# Patient Record
Sex: FEMALE | Race: WHITE | NOT HISPANIC OR LATINO | URBAN - METROPOLITAN AREA
[De-identification: names, ages, dates, MRNs, and addresses within clinical notes are randomized per-mention and may not be internally consistent; named-entity substitution may affect disease eponyms.]

---

## 2017-06-27 ENCOUNTER — APPOINTMENT (OUTPATIENT)
Dept: URBAN - METROPOLITAN AREA CLINIC 200 | Age: 57
Setting detail: DERMATOLOGY
End: 2017-06-27

## 2017-06-27 DIAGNOSIS — L57.8 OTHER SKIN CHANGES DUE TO CHRONIC EXPOSURE TO NONIONIZING RADIATION: ICD-10-CM

## 2017-06-27 DIAGNOSIS — D22 MELANOCYTIC NEVI: ICD-10-CM

## 2017-06-27 PROBLEM — L55.1 SUNBURN OF SECOND DEGREE: Status: ACTIVE | Noted: 2017-06-27

## 2017-06-27 PROBLEM — D22.5 MELANOCYTIC NEVI OF TRUNK: Status: ACTIVE | Noted: 2017-06-27

## 2017-06-27 PROBLEM — L50.9 URTICARIA, UNSPECIFIED: Status: ACTIVE | Noted: 2017-06-27

## 2017-06-27 PROCEDURE — OTHER COUNSELING: OTHER

## 2017-06-27 PROCEDURE — 99213 OFFICE O/P EST LOW 20 MIN: CPT

## 2017-06-27 ASSESSMENT — LOCATION DETAILED DESCRIPTION DERM: LOCATION DETAILED: UPPER STERNUM

## 2017-06-27 ASSESSMENT — LOCATION SIMPLE DESCRIPTION DERM: LOCATION SIMPLE: CHEST

## 2017-06-27 ASSESSMENT — LOCATION ZONE DERM: LOCATION ZONE: TRUNK

## 2018-02-08 ENCOUNTER — APPOINTMENT (OUTPATIENT)
Dept: URBAN - METROPOLITAN AREA CLINIC 200 | Age: 58
Setting detail: DERMATOLOGY
End: 2018-02-13

## 2018-02-08 DIAGNOSIS — L70.0 ACNE VULGARIS: ICD-10-CM

## 2018-02-08 DIAGNOSIS — D22 MELANOCYTIC NEVI: ICD-10-CM

## 2018-02-08 DIAGNOSIS — L57.8 OTHER SKIN CHANGES DUE TO CHRONIC EXPOSURE TO NONIONIZING RADIATION: ICD-10-CM

## 2018-02-08 PROBLEM — D22.5 MELANOCYTIC NEVI OF TRUNK: Status: ACTIVE | Noted: 2018-02-08

## 2018-02-08 PROCEDURE — OTHER COUNSELING: OTHER

## 2018-02-08 PROCEDURE — OTHER PRESCRIPTION SAMPLES PROVIDED: OTHER

## 2018-02-08 PROCEDURE — 99213 OFFICE O/P EST LOW 20 MIN: CPT

## 2018-02-08 ASSESSMENT — LOCATION ZONE DERM
LOCATION ZONE: FACE
LOCATION ZONE: TRUNK

## 2018-02-08 ASSESSMENT — LOCATION SIMPLE DESCRIPTION DERM
LOCATION SIMPLE: RIGHT CHEEK
LOCATION SIMPLE: CHEST

## 2018-02-08 ASSESSMENT — LOCATION DETAILED DESCRIPTION DERM
LOCATION DETAILED: UPPER STERNUM
LOCATION DETAILED: RIGHT INFERIOR MEDIAL MALAR CHEEK

## 2018-06-14 ENCOUNTER — TRANSCRIBE ORDERS (OUTPATIENT)
Dept: SCHEDULING | Age: 58
End: 2018-06-14

## 2018-06-14 DIAGNOSIS — Z12.39 ENCOUNTER FOR OTHER SCREENING FOR MALIGNANT NEOPLASM OF BREAST: Primary | ICD-10-CM

## 2018-06-26 ENCOUNTER — HOSPITAL ENCOUNTER (OUTPATIENT)
Dept: RADIOLOGY | Facility: HOSPITAL | Age: 58
Discharge: HOME | End: 2018-06-26
Attending: OBSTETRICS & GYNECOLOGY
Payer: COMMERCIAL

## 2018-06-26 DIAGNOSIS — Z12.39 ENCOUNTER FOR OTHER SCREENING FOR MALIGNANT NEOPLASM OF BREAST: ICD-10-CM

## 2018-06-26 PROCEDURE — 77067 SCR MAMMO BI INCL CAD: CPT

## 2018-08-08 ENCOUNTER — RX ONLY (RX ONLY)
Age: 58
End: 2018-08-08

## 2018-08-08 ENCOUNTER — APPOINTMENT (OUTPATIENT)
Dept: URBAN - METROPOLITAN AREA CLINIC 200 | Age: 58
Setting detail: DERMATOLOGY
End: 2018-08-16

## 2018-08-08 DIAGNOSIS — L57.8 OTHER SKIN CHANGES DUE TO CHRONIC EXPOSURE TO NONIONIZING RADIATION: ICD-10-CM

## 2018-08-08 DIAGNOSIS — L0391 CELLULITIS AND ABSCESS OF UNSPECIFIED SITES: ICD-10-CM

## 2018-08-08 DIAGNOSIS — Z85.828 PERSONAL HISTORY OF OTHER MALIGNANT NEOPLASM OF SKIN: ICD-10-CM

## 2018-08-08 DIAGNOSIS — L0390 CELLULITIS AND ABSCESS OF UNSPECIFIED SITES: ICD-10-CM

## 2018-08-08 DIAGNOSIS — D485 NEOPLASM OF UNCERTAIN BEHAVIOR OF SKIN: ICD-10-CM

## 2018-08-08 PROBLEM — L03.211 CELLULITIS OF FACE: Status: ACTIVE | Noted: 2018-08-08

## 2018-08-08 PROBLEM — D48.5 NEOPLASM OF UNCERTAIN BEHAVIOR OF SKIN: Status: ACTIVE | Noted: 2018-08-08

## 2018-08-08 PROBLEM — L55.1 SUNBURN OF SECOND DEGREE: Status: ACTIVE | Noted: 2018-08-08

## 2018-08-08 PROCEDURE — OTHER DEFER: OTHER

## 2018-08-08 PROCEDURE — OTHER COUNSELING: OTHER

## 2018-08-08 PROCEDURE — OTHER PRESCRIPTION: OTHER

## 2018-08-08 PROCEDURE — 99213 OFFICE O/P EST LOW 20 MIN: CPT

## 2018-08-08 RX ORDER — AMOXICILLIN AND CLAVULANATE POTASSIUM 875; 125 MG/1; 1/1
TABLET, FILM COATED ORAL
Qty: 14 | Refills: 0 | Status: ERX

## 2018-08-08 ASSESSMENT — LOCATION DETAILED DESCRIPTION DERM
LOCATION DETAILED: LEFT CHIN
LOCATION DETAILED: RIGHT CHIN
LOCATION DETAILED: LEFT INFERIOR ANTERIOR NECK
LOCATION DETAILED: LEFT MEDIAL SUPERIOR CHEST

## 2018-08-08 ASSESSMENT — LOCATION ZONE DERM
LOCATION ZONE: FACE
LOCATION ZONE: NECK
LOCATION ZONE: TRUNK

## 2018-08-08 ASSESSMENT — LOCATION SIMPLE DESCRIPTION DERM
LOCATION SIMPLE: LEFT ANTERIOR NECK
LOCATION SIMPLE: CHIN
LOCATION SIMPLE: CHEST

## 2018-08-08 ASSESSMENT — SEVERITY ASSESSMENT: SEVERITY: MILD TO MODERATE

## 2018-08-13 ENCOUNTER — HOSPITAL ENCOUNTER (EMERGENCY)
Facility: HOSPITAL | Age: 58
Discharge: HOME | End: 2018-08-13
Attending: EMERGENCY MEDICINE
Payer: COMMERCIAL

## 2018-08-13 VITALS
BODY MASS INDEX: 21.16 KG/M2 | HEART RATE: 82 BPM | SYSTOLIC BLOOD PRESSURE: 138 MMHG | HEIGHT: 62 IN | WEIGHT: 115 LBS | DIASTOLIC BLOOD PRESSURE: 70 MMHG | TEMPERATURE: 98.7 F | OXYGEN SATURATION: 99 % | RESPIRATION RATE: 16 BRPM

## 2018-08-13 DIAGNOSIS — S61.217A LACERATION OF LEFT LITTLE FINGER WITHOUT FOREIGN BODY, NAIL DAMAGE STATUS UNSPECIFIED, INITIAL ENCOUNTER: Primary | ICD-10-CM

## 2018-08-13 PROCEDURE — 0HQGXZZ REPAIR LEFT HAND SKIN, EXTERNAL APPROACH: ICD-10-PCS | Performed by: EMERGENCY MEDICINE

## 2018-08-13 PROCEDURE — 99283 EMERGENCY DEPT VISIT LOW MDM: CPT

## 2018-08-13 PROCEDURE — 12001 RPR S/N/AX/GEN/TRNK 2.5CM/<: CPT | Mod: F3 | Performed by: PHYSICIAN ASSISTANT

## 2018-08-13 RX ORDER — VENLAFAXINE HYDROCHLORIDE 150 MG/1
150 CAPSULE, EXTENDED RELEASE ORAL DAILY
COMMUNITY

## 2018-08-13 RX ORDER — AMOXICILLIN AND CLAVULANATE POTASSIUM 875; 125 MG/1; MG/1
1 TABLET, FILM COATED ORAL 2 TIMES DAILY
COMMUNITY
End: 2021-07-30

## 2018-08-13 ASSESSMENT — ENCOUNTER SYMPTOMS
WOUND: 1
FEVER: 0
NUMBNESS: 0
CHILLS: 0
JOINT SWELLING: 0
ARTHRALGIAS: 0

## 2018-08-14 NOTE — ED ATTESTATION NOTE
The patient was evaluated and managed by the physician assistant / nurse practitioner.     DINORAH Florez MD  08/13/18 2025

## 2018-08-14 NOTE — DISCHARGE INSTRUCTIONS
Avoid getting wet tonight.  Tomorrow  and moving forward can get wet but not soaked  Skin glue should fall off on its own over the course of the week  Do not cover with topical antibiotic or bandaid  Return to ER if wound becomes red, hot, swollen, has purulent drainage or any other concerning symptoms

## 2018-08-14 NOTE — ED PROVIDER NOTES
"HPI     Chief Complaint   Patient presents with   • Finger Laceration         Laceration   Location:  Finger  Finger laceration location:  L ring finger  Length:  .75cm  Depth:  Cutaneous  Bleeding: controlled    Time since incident:  1 hour  Laceration mechanism:  Knife  Pain details:     Quality: stinging.    Severity:  Mild    Progression:  Improving  Foreign body present:  No foreign bodies  Relieved by:  Pressure  Worsened by:  Nothing  Tetanus status:  Up to date  Associated symptoms: no fever         Patient History     Past Medical History:   Diagnosis Date   • Depression    • Lung nodule        Past Surgical History:   Procedure Laterality Date   • LUNG SURGERY         History reviewed. No pertinent family history.    Social History   Substance Use Topics   • Smoking status: Current Every Day Smoker   • Smokeless tobacco: Never Used   • Alcohol use No       Systems Reviewed from Nursing Triage:          Review of Systems     Review of Systems   Constitutional: Negative for chills and fever.   Musculoskeletal: Negative for arthralgias and joint swelling.   Skin: Positive for wound.   Neurological: Negative for numbness.        Physical Exam     ED Triage Vitals [08/13/18 1917]   Temp Heart Rate Resp BP SpO2   37.1 °C (98.7 °F) 85 16 (!) 142/69 95 %      Temp Source Heart Rate Source Patient Position BP Location FiO2 (%) (Set)   Oral -- -- -- --                     Patient Vitals for the past 24 hrs:   BP Temp Temp src Pulse Resp SpO2 Height Weight   08/13/18 1917 (!) 142/69 37.1 °C (98.7 °F) Oral 85 16 95 % 1.575 m (5' 2\") 52.2 kg (115 lb)           Physical Exam   Constitutional: She is oriented to person, place, and time. She appears well-developed and well-nourished.   Cardiovascular: Intact distal pulses.    Pulmonary/Chest: Effort normal.   Musculoskeletal:        Hands:  Neurological: She is alert and oriented to person, place, and time.   Skin: Skin is warm. Capillary refill takes less than 2 seconds. "   Psychiatric: She has a normal mood and affect.   Nursing note and vitals reviewed.           Laceration Repair  Date/Time: 8/13/2018 8:18 PM  Performed by: LESLI KELLY  Authorized by: DINORAH PRIEST     Consent:     Consent obtained:  Verbal    Consent given by:  Patient    Risks discussed:  Pain  Anesthesia (see MAR for exact dosages):     Anesthesia method:  None  Laceration details:     Location:  Finger    Finger location:  L ring finger    Length (cm):  0.8  Repair type:     Repair type:  Simple  Exploration:     Hemostasis achieved with:  Cautery    Wound exploration: wound explored through full range of motion      Contaminated: no    Treatment:     Area cleansed with:  Shur-Clens and saline    Amount of cleaning:  Standard    Irrigation solution:  Sterile saline    Irrigation method:  Syringe    Visualized foreign bodies/material removed: no    Skin repair:     Repair method:  Tissue adhesive  Approximation:     Approximation:  Close    Vermilion border: well-aligned    Post-procedure details:     Dressing:  Splint for protection    Patient tolerance of procedure:  Tolerated well, no immediate complications        ED Course & MDM     Labs Reviewed - No data to display    No orders to display           [facility]         Clinical Impressions as of Aug 13 2010   Laceration of left little finger without foreign body, nail damage status unspecified, initial encounter        Lseli Kelly PA  08/13/18 2010       Lesli Kelly PA  08/13/18 2019

## 2018-09-21 ENCOUNTER — APPOINTMENT (OUTPATIENT)
Dept: URBAN - METROPOLITAN AREA CLINIC 200 | Age: 58
Setting detail: DERMATOLOGY
End: 2018-09-23

## 2018-09-21 DIAGNOSIS — D485 NEOPLASM OF UNCERTAIN BEHAVIOR OF SKIN: ICD-10-CM

## 2018-09-21 PROBLEM — D48.5 NEOPLASM OF UNCERTAIN BEHAVIOR OF SKIN: Status: ACTIVE | Noted: 2018-09-21

## 2018-09-21 PROCEDURE — 11100: CPT

## 2018-09-21 PROCEDURE — OTHER BIOPSY BY SHAVE METHOD: OTHER

## 2018-09-21 ASSESSMENT — LOCATION ZONE DERM: LOCATION ZONE: FACE

## 2018-09-21 ASSESSMENT — LOCATION DETAILED DESCRIPTION DERM: LOCATION DETAILED: RIGHT CHIN

## 2018-09-21 ASSESSMENT — LOCATION SIMPLE DESCRIPTION DERM: LOCATION SIMPLE: CHIN

## 2018-09-21 NOTE — PROCEDURE: BIOPSY BY SHAVE METHOD
Destruction After The Procedure: No
Silver Nitrate Text: The wound bed was treated with silver nitrate after the biopsy was performed.
Consent: Written consent was obtained and risks were reviewed including but not limited to scarring, infection, bleeding, scabbing, incomplete removal, nerve damage and allergy to anesthesia.
Was A Bandage Applied: Yes
Detail Level: Detailed
Electrodesiccation Text: The wound bed was treated with electrodesiccation after the biopsy was performed.
Cryotherapy Text: The wound bed was treated with cryotherapy after the biopsy was performed.
Additional Anesthesia Volume In Cc (Will Not Render If 0): 0
Anesthesia Type: 1% lidocaine with epinephrine
Billing Type: Third-Party Bill
Wound Care: Petrolatum
Post-Care Instructions: I reviewed with the patient in detail post-care instructions. Patient is to keep the biopsy site dry overnight, and then apply bacitracin twice daily until healed. Patient may apply hydrogen peroxide soaks to remove any crusting.
Biopsy Method: Dermablade
Dressing: bandage
Notification Instructions: Patient will be notified of biopsy results. However, patient instructed to call the office if not contacted within 2 weeks.
Biopsy Type: H and E
Type Of Destruction Used: Curettage
Depth Of Biopsy: dermis
Curettage Text: The wound bed was treated with curettage after the biopsy was performed.
Electrodesiccation And Curettage Text: The wound bed was treated with electrodesiccation and curettage after the biopsy was performed.
Anesthesia Volume In Cc (Will Not Render If 0): 0.5
Hemostasis: Drysol

## 2018-10-26 ENCOUNTER — TELEPHONE (OUTPATIENT)
Dept: PULMONOLOGY | Facility: HOSPITAL | Age: 58
End: 2018-10-26

## 2018-10-26 VITALS — WEIGHT: 120 LBS | HEIGHT: 63 IN | BODY MASS INDEX: 21.26 KG/M2

## 2018-10-26 NOTE — TELEPHONE ENCOUNTER
Narcisa was screened but didn't have her script, will call me when she gets home later today.   ICD 10 code:  Z87.891.

## 2018-10-31 ENCOUNTER — TRANSCRIBE ORDERS (OUTPATIENT)
Dept: SCHEDULING | Age: 58
End: 2018-10-31

## 2018-10-31 DIAGNOSIS — Z87.891 PERSONAL HISTORY OF NICOTINE DEPENDENCE: Primary | ICD-10-CM

## 2018-11-14 ENCOUNTER — HOSPITAL ENCOUNTER (OUTPATIENT)
Dept: RADIOLOGY | Facility: HOSPITAL | Age: 58
Discharge: HOME | End: 2018-11-14
Attending: INTERNAL MEDICINE
Payer: COMMERCIAL

## 2018-11-14 DIAGNOSIS — Z87.891 PERSONAL HISTORY OF NICOTINE DEPENDENCE: ICD-10-CM

## 2018-11-14 PROCEDURE — G0297 LDCT FOR LUNG CA SCREEN: HCPCS

## 2019-09-03 ENCOUNTER — APPOINTMENT (OUTPATIENT)
Dept: URBAN - METROPOLITAN AREA CLINIC 200 | Age: 59
Setting detail: DERMATOLOGY
End: 2019-09-10

## 2019-09-03 DIAGNOSIS — L82.0 INFLAMED SEBORRHEIC KERATOSIS: ICD-10-CM

## 2019-09-03 DIAGNOSIS — L57.0 ACTINIC KERATOSIS: ICD-10-CM

## 2019-09-03 DIAGNOSIS — Z85.828 PERSONAL HISTORY OF OTHER MALIGNANT NEOPLASM OF SKIN: ICD-10-CM

## 2019-09-03 DIAGNOSIS — B07.8 OTHER VIRAL WARTS: ICD-10-CM

## 2019-09-03 PROCEDURE — OTHER COUNSELING: OTHER

## 2019-09-03 PROCEDURE — 17110 DESTRUCT B9 LESION 1-14: CPT

## 2019-09-03 PROCEDURE — OTHER LIQUID NITROGEN: OTHER

## 2019-09-03 PROCEDURE — OTHER MIPS QUALITY: OTHER

## 2019-09-03 PROCEDURE — 17000 DESTRUCT PREMALG LESION: CPT | Mod: 59

## 2019-09-03 PROCEDURE — OTHER REASSURANCE: OTHER

## 2019-09-03 PROCEDURE — 99213 OFFICE O/P EST LOW 20 MIN: CPT | Mod: 25

## 2019-09-03 ASSESSMENT — LOCATION DETAILED DESCRIPTION DERM
LOCATION DETAILED: RIGHT SUPERIOR UPPER BACK
LOCATION DETAILED: LEFT INFERIOR ANTERIOR NECK
LOCATION DETAILED: LEFT ANTERIOR DISTAL THIGH
LOCATION DETAILED: RIGHT SUPERIOR MEDIAL FOREHEAD

## 2019-09-03 ASSESSMENT — LOCATION ZONE DERM
LOCATION ZONE: FACE
LOCATION ZONE: NECK
LOCATION ZONE: TRUNK
LOCATION ZONE: LEG

## 2019-09-03 ASSESSMENT — LOCATION SIMPLE DESCRIPTION DERM
LOCATION SIMPLE: RIGHT UPPER BACK
LOCATION SIMPLE: LEFT ANTERIOR NECK
LOCATION SIMPLE: RIGHT FOREHEAD
LOCATION SIMPLE: LEFT THIGH

## 2019-09-03 ASSESSMENT — TOTAL NUMBER OF VERRUCA VULGARIS: # OF LESIONS?: 1

## 2019-09-03 ASSESSMENT — PAIN INTENSITY VAS: HOW INTENSE IS YOUR PAIN 0 BEING NO PAIN, 10 BEING THE MOST SEVERE PAIN POSSIBLE?: NO PAIN

## 2019-09-03 NOTE — PROCEDURE: LIQUID NITROGEN
Render Post-Care Instructions In Note?: no
Post-Care Instructions: I reviewed with the patient in detail post-care instructions. Patient is to wear sunprotection, and avoid picking at any of the treated lesions. Pt may apply Vaseline to crusted or scabbing areas.
Include Z78.9 (Other Specified Conditions Influencing Health Status) As An Associated Diagnosis?: Yes
Duration Of Freeze Thaw-Cycle (Seconds): 2
Number Of Freeze-Thaw Cycles: 2 freeze-thaw cycles
Medical Necessity Clause: This procedure was medically necessary because the lesions that were treated were: causing pain
Detail Level: Detailed
Detail Level: Simple
Consent: The patient's consent was obtained including but not limited to risks of crusting, scabbing, blistering, scarring, darker or lighter pigmentary change, recurrence, incomplete removal and infection.
Medical Necessity Information: It is in your best interest to select a reason for this procedure from the list below. All of these items fulfill various CMS LCD requirements except the new and changing color options.

## 2019-09-03 NOTE — PROCEDURE: MIPS QUALITY
Detail Level: Detailed
Quality 474: Zoster Vaccination Status: Shingrix vaccine was not administered for reasons documented by clinician (e.g. patient administered vaccine other than Shingrix, patient allergy or other medical reasons, patient declined or other patient reasons, vaccine not available or other system reasons)
Additional Notes: Shingles SHINGRIX vaccine not administered due to patients personal or medical reasons. Patient declined to elaborate on those reasons.

## 2019-09-17 ENCOUNTER — TRANSCRIBE ORDERS (OUTPATIENT)
Dept: SCHEDULING | Age: 59
End: 2019-09-17

## 2019-09-17 DIAGNOSIS — R14.0 ABDOMINAL DISTENSION (GASEOUS): Primary | ICD-10-CM

## 2019-09-23 ENCOUNTER — HOSPITAL ENCOUNTER (OUTPATIENT)
Dept: RADIOLOGY | Facility: HOSPITAL | Age: 59
Discharge: HOME | End: 2019-09-23
Attending: FAMILY MEDICINE
Payer: COMMERCIAL

## 2019-09-23 DIAGNOSIS — R14.0 ABDOMINAL DISTENSION (GASEOUS): ICD-10-CM

## 2019-09-23 PROCEDURE — 76700 US EXAM ABDOM COMPLETE: CPT

## 2019-09-23 PROCEDURE — 76830 TRANSVAGINAL US NON-OB: CPT

## 2019-11-20 ENCOUNTER — TELEPHONE (OUTPATIENT)
Dept: PULMONOLOGY | Facility: HOSPITAL | Age: 59
End: 2019-11-20

## 2019-11-20 VITALS — HEIGHT: 63 IN | WEIGHT: 120 LBS | BODY MASS INDEX: 21.26 KG/M2

## 2019-11-20 DIAGNOSIS — Z87.891 PERSONAL HISTORY OF TOBACCO USE, PRESENTING HAZARDS TO HEALTH: Primary | ICD-10-CM

## 2019-11-26 ENCOUNTER — HOSPITAL ENCOUNTER (OUTPATIENT)
Dept: RADIOLOGY | Facility: HOSPITAL | Age: 59
Discharge: HOME | End: 2019-11-26
Attending: INTERNAL MEDICINE
Payer: COMMERCIAL

## 2019-11-26 DIAGNOSIS — Z87.891 PERSONAL HISTORY OF TOBACCO USE, PRESENTING HAZARDS TO HEALTH: ICD-10-CM

## 2019-11-26 PROCEDURE — G0297 LDCT FOR LUNG CA SCREEN: HCPCS

## 2020-03-03 ENCOUNTER — APPOINTMENT (OUTPATIENT)
Dept: URBAN - METROPOLITAN AREA CLINIC 200 | Age: 60
Setting detail: DERMATOLOGY
End: 2020-03-03

## 2020-03-03 ENCOUNTER — APPOINTMENT (OUTPATIENT)
Dept: URBAN - METROPOLITAN AREA CLINIC 200 | Age: 60
Setting detail: DERMATOLOGY
End: 2020-03-04

## 2020-03-03 DIAGNOSIS — Z85.828 PERSONAL HISTORY OF OTHER MALIGNANT NEOPLASM OF SKIN: ICD-10-CM

## 2020-03-03 DIAGNOSIS — L91.8 OTHER HYPERTROPHIC DISORDERS OF THE SKIN: ICD-10-CM

## 2020-03-03 DIAGNOSIS — L57.8 OTHER SKIN CHANGES DUE TO CHRONIC EXPOSURE TO NONIONIZING RADIATION: ICD-10-CM

## 2020-03-03 DIAGNOSIS — B07.8 OTHER VIRAL WARTS: ICD-10-CM

## 2020-03-03 DIAGNOSIS — L82.1 OTHER SEBORRHEIC KERATOSIS: ICD-10-CM

## 2020-03-03 PROCEDURE — OTHER REASSURANCE: OTHER

## 2020-03-03 PROCEDURE — 99213 OFFICE O/P EST LOW 20 MIN: CPT | Mod: 25

## 2020-03-03 PROCEDURE — OTHER MIPS QUALITY: OTHER

## 2020-03-03 PROCEDURE — 17110 DESTRUCT B9 LESION 1-14: CPT

## 2020-03-03 PROCEDURE — OTHER COUNSELING: OTHER

## 2020-03-03 PROCEDURE — OTHER LIQUID NITROGEN: OTHER

## 2020-03-03 ASSESSMENT — LOCATION DETAILED DESCRIPTION DERM
LOCATION DETAILED: LEFT MEDIAL SUPERIOR CHEST
LOCATION DETAILED: RIGHT MEDIAL SUPERIOR TARSAL REGION
LOCATION DETAILED: PERIUMBILICAL SKIN
LOCATION DETAILED: LEFT ANTERIOR DISTAL THIGH
LOCATION DETAILED: RIGHT PROXIMAL PRETIBIAL REGION
LOCATION DETAILED: RIGHT CENTRAL MALAR CHEEK

## 2020-03-03 ASSESSMENT — LOCATION SIMPLE DESCRIPTION DERM
LOCATION SIMPLE: RIGHT MEDIAL SUPERIOR TARSAL REGION
LOCATION SIMPLE: LEFT THIGH
LOCATION SIMPLE: CHEST
LOCATION SIMPLE: RIGHT PRETIBIAL REGION
LOCATION SIMPLE: RIGHT CHEEK
LOCATION SIMPLE: ABDOMEN

## 2020-03-03 ASSESSMENT — LOCATION ZONE DERM
LOCATION ZONE: TRUNK
LOCATION ZONE: FACE
LOCATION ZONE: LEG
LOCATION ZONE: EYELID

## 2020-03-03 NOTE — PROCEDURE: LIQUID NITROGEN
Include Z78.9 (Other Specified Conditions Influencing Health Status) As An Associated Diagnosis?: Yes
Render Post-Care Instructions In Note?: no
Medical Necessity Information: It is in your best interest to select a reason for this procedure from the list below. All of these items fulfill various CMS LCD requirements except the new and changing color options.
Post-Care Instructions: I reviewed with the patient in detail post-care instructions. Patient is to wear sunprotection, and avoid picking at any of the treated lesions. Pt may apply Vaseline to crusted or scabbing areas.
Medical Necessity Clause: This procedure was medically necessary because the lesions that were treated were: causing pain
Detail Level: Detailed
Consent: The patient's consent was obtained including but not limited to risks of crusting, scabbing, blistering, scarring, darker or lighter pigmentary change, recurrence, incomplete removal and infection.
Number Of Freeze-Thaw Cycles: 2 freeze-thaw cycles

## 2020-03-03 NOTE — PROCEDURE: REASSURANCE
Hide Additional Notes?: No
Include Location In Plan?: Yes
Detail Level: Detailed
Additional Note: Cautery

## 2020-07-16 ENCOUNTER — TRANSCRIBE ORDERS (OUTPATIENT)
Dept: SCHEDULING | Age: 60
End: 2020-07-16

## 2020-07-16 DIAGNOSIS — Z12.31 ENCOUNTER FOR SCREENING MAMMOGRAM FOR MALIGNANT NEOPLASM OF BREAST: Primary | ICD-10-CM

## 2020-08-12 ENCOUNTER — HOSPITAL ENCOUNTER (OUTPATIENT)
Dept: RADIOLOGY | Facility: HOSPITAL | Age: 60
Discharge: HOME | End: 2020-08-12
Attending: FAMILY MEDICINE
Payer: COMMERCIAL

## 2020-08-12 DIAGNOSIS — Z12.31 ENCOUNTER FOR SCREENING MAMMOGRAM FOR MALIGNANT NEOPLASM OF BREAST: ICD-10-CM

## 2020-08-12 PROCEDURE — 77063 BREAST TOMOSYNTHESIS BI: CPT

## 2020-08-13 ENCOUNTER — TRANSCRIBE ORDERS (OUTPATIENT)
Dept: REGISTRATION | Facility: HOSPITAL | Age: 60
End: 2020-08-13

## 2020-08-13 DIAGNOSIS — R92.8 OTHER ABNORMAL AND INCONCLUSIVE FINDINGS ON DIAGNOSTIC IMAGING OF BREAST: Primary | ICD-10-CM

## 2020-08-18 ENCOUNTER — HOSPITAL ENCOUNTER (OUTPATIENT)
Dept: RADIOLOGY | Facility: HOSPITAL | Age: 60
Discharge: HOME | End: 2020-08-18
Attending: STUDENT IN AN ORGANIZED HEALTH CARE EDUCATION/TRAINING PROGRAM
Payer: COMMERCIAL

## 2020-08-18 DIAGNOSIS — R92.8 OTHER ABNORMAL AND INCONCLUSIVE FINDINGS ON DIAGNOSTIC IMAGING OF BREAST: ICD-10-CM

## 2020-08-18 PROCEDURE — 76642 ULTRASOUND BREAST LIMITED: CPT | Mod: RT

## 2020-08-18 PROCEDURE — G0279 TOMOSYNTHESIS, MAMMO: HCPCS | Mod: RT

## 2020-09-03 ENCOUNTER — APPOINTMENT (OUTPATIENT)
Dept: URBAN - METROPOLITAN AREA CLINIC 200 | Age: 60
Setting detail: DERMATOLOGY
End: 2020-09-18

## 2020-09-03 DIAGNOSIS — D485 NEOPLASM OF UNCERTAIN BEHAVIOR OF SKIN: ICD-10-CM

## 2020-09-03 DIAGNOSIS — Z85.828 PERSONAL HISTORY OF OTHER MALIGNANT NEOPLASM OF SKIN: ICD-10-CM

## 2020-09-03 DIAGNOSIS — L57.8 OTHER SKIN CHANGES DUE TO CHRONIC EXPOSURE TO NONIONIZING RADIATION: ICD-10-CM

## 2020-09-03 PROBLEM — D48.5 NEOPLASM OF UNCERTAIN BEHAVIOR OF SKIN: Status: ACTIVE | Noted: 2020-09-03

## 2020-09-03 PROBLEM — L55.1 SUNBURN OF SECOND DEGREE: Status: ACTIVE | Noted: 2020-09-03

## 2020-09-03 PROCEDURE — OTHER COUNSELING: OTHER

## 2020-09-03 PROCEDURE — 11103 TANGNTL BX SKIN EA SEP/ADDL: CPT | Mod: 59

## 2020-09-03 PROCEDURE — 11102 TANGNTL BX SKIN SINGLE LES: CPT | Mod: 59

## 2020-09-03 PROCEDURE — OTHER BIOPSY BY SHAVE METHOD: OTHER

## 2020-09-03 PROCEDURE — OTHER BENIGN DESTRUCTION: OTHER

## 2020-09-03 PROCEDURE — 99213 OFFICE O/P EST LOW 20 MIN: CPT | Mod: 25

## 2020-09-03 PROCEDURE — 17110 DESTRUCT B9 LESION 1-14: CPT

## 2020-09-03 ASSESSMENT — LOCATION DETAILED DESCRIPTION DERM
LOCATION DETAILED: LEFT MEDIAL SUPERIOR CHEST
LOCATION DETAILED: LEFT INFERIOR ANTERIOR NECK
LOCATION DETAILED: RIGHT KNEE
LOCATION DETAILED: LEFT ANTERIOR LATERAL DISTAL THIGH

## 2020-09-03 ASSESSMENT — LOCATION SIMPLE DESCRIPTION DERM
LOCATION SIMPLE: LEFT THIGH
LOCATION SIMPLE: LEFT ANTERIOR NECK
LOCATION SIMPLE: CHEST
LOCATION SIMPLE: RIGHT KNEE

## 2020-09-03 ASSESSMENT — LOCATION ZONE DERM
LOCATION ZONE: TRUNK
LOCATION ZONE: LEG
LOCATION ZONE: NECK

## 2020-09-03 NOTE — PROCEDURE: BENIGN DESTRUCTION
Treatment Number (Will Not Render If 0): 0
Detail Level: Detailed
Consent: The patient's consent was obtained including but not limited to risks of crusting, scabbing, blistering, scarring, darker or lighter pigmentary change, recurrence, incomplete removal and infection.
Anesthesia Volume In Cc: 0.5
Add 52 Modifier (Optional): no
Medical Necessity Information: It is in your best interest to select a reason for this procedure from the list below. All of these items fulfill various CMS LCD requirements except the new and changing color options.
Post-Care Instructions: I reviewed with the patient in detail post-care instructions. Patient is to wear sunprotection, and avoid picking at any of the treated lesions. Pt may apply Vaseline to crusted or scabbing areas.
Medical Necessity Clause: This procedure was medically necessary because the lesions that were treated were: irritated

## 2020-09-03 NOTE — PROCEDURE: BIOPSY BY SHAVE METHOD
Information: Selecting Yes will display possible errors in your note based on the variables you have selected. This validation is only offered as a suggestion for you. PLEASE NOTE THAT THE VALIDATION TEXT WILL BE REMOVED WHEN YOU FINALIZE YOUR NOTE. IF YOU WANT TO FAX A PRELIMINARY NOTE YOU WILL NEED TO TOGGLE THIS TO 'NO' IF YOU DO NOT WANT IT IN YOUR FAXED NOTE.
Validate Anticipated Plan: No
Anesthesia Volume In Cc (Will Not Render If 0): 0.5
Biopsy Type: H and E
Biopsy Method: sterile single edge surgical blade
Consent: Written consent was obtained and risks were reviewed including but not limited to scarring, infection, bleeding, scabbing, incomplete removal, nerve damage and allergy to anesthesia.
Electrodesiccation And Curettage Text: The wound bed was treated with electrodesiccation and curettage after the biopsy was performed.
Additional Anesthesia Volume In Cc (Will Not Render If 0): 0
Detail Level: Detailed
Wound Care: Aquaphor
Hemostasis: Drysol
Was A Bandage Applied: Yes
Curettage Text: The wound bed was treated with curettage after the biopsy was performed.
Billing Type: Third-Party Bill
Size Of Lesion In Cm: 0.6
Post-Care Instructions: I reviewed with the patient in detail post-care instructions. Patient is to keep the biopsy site dry overnight, and then apply bacitracin twice daily until healed. Patient may apply hydrogen peroxide soaks to remove any crusting.
Depth Of Biopsy: dermis
Dressing: bandage
Notification Instructions: Patient will be notified of biopsy results. However, patient instructed to call the office if not contacted within 2 weeks.
Electrodesiccation Text: The wound bed was treated with electrodesiccation after the biopsy was performed.
Silver Nitrate Text: The wound bed was treated with silver nitrate after the biopsy was performed.
Type Of Destruction Used: Curettage
Cryotherapy Text: The wound bed was treated with cryotherapy after the biopsy was performed.

## 2021-06-22 ENCOUNTER — TRANSCRIBE ORDERS (OUTPATIENT)
Dept: SCHEDULING | Age: 61
End: 2021-06-22

## 2021-06-22 DIAGNOSIS — R92.8 OTHER ABNORMAL AND INCONCLUSIVE FINDINGS ON DIAGNOSTIC IMAGING OF BREAST: Primary | ICD-10-CM

## 2021-07-07 ENCOUNTER — HOSPITAL ENCOUNTER (OUTPATIENT)
Dept: RADIOLOGY | Age: 61
Discharge: HOME | End: 2021-07-07
Attending: FAMILY MEDICINE
Payer: COMMERCIAL

## 2021-07-07 DIAGNOSIS — R92.8 OTHER ABNORMAL AND INCONCLUSIVE FINDINGS ON DIAGNOSTIC IMAGING OF BREAST: ICD-10-CM

## 2021-07-07 PROCEDURE — G0279 TOMOSYNTHESIS, MAMMO: HCPCS | Mod: RT

## 2021-07-07 PROCEDURE — 76642 ULTRASOUND BREAST LIMITED: CPT | Mod: RT

## 2021-07-30 ENCOUNTER — HOSPITAL ENCOUNTER (EMERGENCY)
Facility: HOSPITAL | Age: 61
Discharge: HOME | End: 2021-07-30
Attending: EMERGENCY MEDICINE
Payer: COMMERCIAL

## 2021-07-30 VITALS
RESPIRATION RATE: 18 BRPM | SYSTOLIC BLOOD PRESSURE: 156 MMHG | DIASTOLIC BLOOD PRESSURE: 70 MMHG | WEIGHT: 120 LBS | OXYGEN SATURATION: 96 % | HEIGHT: 62 IN | BODY MASS INDEX: 22.08 KG/M2 | TEMPERATURE: 97.8 F | HEART RATE: 70 BPM
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
## 2021-07-30 PROCEDURE — 12002 RPR S/N/AX/GEN/TRNK2.6-7.5CM: CPT | Performed by: NURSE PRACTITIONER

## 2021-07-30 PROCEDURE — 0JQG0ZZ REPAIR RIGHT LOWER ARM SUBCUTANEOUS TISSUE AND FASCIA, OPEN APPROACH: ICD-10-PCS | Performed by: EMERGENCY MEDICINE

## 2021-07-30 PROCEDURE — 99282 EMERGENCY DEPT VISIT SF MDM: CPT | Mod: 25

## 2021-07-30 RX ORDER — AMOXICILLIN AND CLAVULANATE POTASSIUM 875; 125 MG/1; MG/1
1 TABLET, FILM COATED ORAL ONCE
Status: DISCONTINUED | OUTPATIENT
Start: 2021-07-30 | End: 2021-07-30 | Stop reason: HOSPADM

## 2021-07-30 RX ORDER — IBUPROFEN 600 MG/1
600 TABLET ORAL ONCE
Status: DISCONTINUED | OUTPATIENT
Start: 2021-07-30 | End: 2021-07-30 | Stop reason: HOSPADM

## 2021-07-30 RX ORDER — AMOXICILLIN AND CLAVULANATE POTASSIUM 875; 125 MG/1; MG/1
1 TABLET, FILM COATED ORAL
Qty: 14 TABLET | Refills: 0 | Status: SHIPPED | OUTPATIENT
Start: 2021-07-30 | End: 2021-08-06

## 2021-07-30 ASSESSMENT — ENCOUNTER SYMPTOMS
WOUND: 1
BRUISES/BLEEDS EASILY: 0
NUMBNESS: 0
ARTHRALGIAS: 1
WEAKNESS: 0
JOINT SWELLING: 0

## 2021-07-31 NOTE — DISCHARGE INSTRUCTIONS
Your wound was closed with a loose closure with 4 sutures to reduce risk of infection.  A dressing was placed in the emergency department tonight, leave the dressing in place until tomorrow.      Change the dressing daily, when you change the dressing wash over the wound gently with warm water and mild soap and apply topical antibiotic ointment (such as Neosporin, Polysporin, bacitracin) until the wound is completely healed.    Apply ice for 20 minutes at a time to reduce pain and swelling.  Take Motrin and/or Tylenol every 6 hours as needed for pain.  Take dosing according to label instructions.    You were started on an antibiotic (Augmentin) to reduce your risk of infection.  Your first dose was given in the emergency department tonight.  Continue the antibiotic tomorrow morning take as prescribed until complete.    If you develop increased redness or swelling around the wound edges, purulent drainage from the wound, red streaking up your arm, fevers (>100.4) or any other new or concerning symptoms return to the emergency department.

## 2021-07-31 NOTE — ED ATTESTATION NOTE
7/30/20218:10 PM  I have personally seen and examined the patient.  I reviewed and agree with the PA/NP/Resident's assessment and plan of care.    My examination, assessment, and plan of care of Narcisa Ramirez is as follows:  The patient presents with a dog bite to the right forearm.  Patient states that her dog is not feeling well she was near the dog when it bit her on the right forearm.  Her dog is otherwise healthy and up-to-date on shots.  Patient is right-hand dominant.      Exam: Patient is awake alert and oriented x3 and somewhat tearful.  Right forearm reveals a 2 and half centimeter jagged laceration is very superficial on the distal dorsal forearm just above the distal third of the radius.  This involves epidermis and dermis only and does not go down through fat or vessels.  There is no active bleeding.  No foreign body.  The distal neurovascular exam is normal.  Extensor function is intact.      Impression/Plan: Patient has a dog bite to the right forearm for which we will treat with antibiotics and loose approximation.  She is up-to-date on tetanus.  She will follow up with her PCP for wound care checks and close follow-up.     I was physically present for the key/critical portions of the following procedures: None     Randy Campbell,   07/30/21 2012

## 2021-07-31 NOTE — ED PROVIDER NOTES
Emergency Medicine Note  HPI   HISTORY OF PRESENT ILLNESS     60-year-old right-hand-dominant female presents to the emergency department with reported animal bite that occurred tonight.  Patient states that she was accidentally bit by her dog while attempting to place the water bowl out in front of him.  She notes that the dog has been feeling unwell over the past several days for which they have noticed behavior changes.  Patient presents with laceration to her right forearm with associated pain.  Denies associated numbness or weakness.  Reports that the dog is up-to-date with his vaccinations and she is up-to-date with her tetanus.  Patient is not on anticoagulation.      Animal Bite  Associated symptoms: no numbness          Patient History   PAST HISTORY     Reviewed from Nursing Triage: Tobacco  Allergies  Meds  Problems  Med Hx  Surg Hx  Fam Hx  Soc Hx        Past Medical History:   Diagnosis Date   • Depression    • Lung nodule        Past Surgical History:   Procedure Laterality Date   • LUNG SURGERY         History reviewed. No pertinent family history.    Social History     Tobacco Use   • Smoking status: Current Every Day Smoker     Packs/day: 0.75     Years: 36.00     Pack years: 27.00     Types: Cigarettes   • Smokeless tobacco: Never Used   • Tobacco comment: never vaped   Substance Use Topics   • Alcohol use: No   • Drug use: No         Review of Systems   REVIEW OF SYSTEMS     Review of Systems   Musculoskeletal: Positive for arthralgias (Right arm). Negative for joint swelling.   Skin: Positive for wound (Laceration right arm).   Neurological: Negative for weakness and numbness.   Hematological: Does not bruise/bleed easily.         VITALS     ED Vitals    Date/Time Temp Pulse Resp BP SpO2 [facility]   07/30/21 1931 36.6 °C (97.8 °F) 70 18 156/70 96 % LDM        Pulse Ox %: 96 % (07/30/21 2012)  Pulse Ox Interpretation: Normal (07/30/21 2012)           Physical Exam   PHYSICAL EXAM     Physical  Exam  Vitals and nursing note reviewed.   Constitutional:       Appearance: Normal appearance.   HENT:      Head: Atraumatic.   Musculoskeletal:      Right forearm: Laceration (4 cm vertical superficial laceration, bleeding controlled, no foreign body) present. No swelling, deformity, tenderness or bony tenderness.        Arms:    Skin:     General: Skin is warm and dry.      Capillary Refill: Capillary refill takes less than 2 seconds.   Neurological:      General: No focal deficit present.      Mental Status: She is alert and oriented to person, place, and time.   Psychiatric:         Mood and Affect: Mood normal.         Behavior: Behavior normal.           PROCEDURES     Laceration Repair    Date/Time: 7/30/2021 8:22 PM  Performed by: Pallante, Elizabeth P, CRNP  Authorized by: Randy Campbell DO     Consent:     Consent obtained:  Verbal    Consent given by:  Patient    Risks discussed:  Infection, need for additional repair, pain, poor cosmetic result and poor wound healing  Anesthesia (see MAR for exact dosages):     Anesthesia method:  Local infiltration    Local anesthetic:  Lidocaine 1% w/o epi  Laceration details:     Location:  Shoulder/arm    Shoulder/arm location:  R lower arm    Length (cm):  4  Repair type:     Repair type:  Simple  Pre-procedure details:     Preparation:  Patient was prepped and draped in usual sterile fashion  Exploration:     Hemostasis achieved with:  Direct pressure    Wound exploration: wound explored through full range of motion and entire depth of wound probed and visualized      Wound extent: no foreign bodies/material noted and no underlying fracture noted    Treatment:     Area cleansed with:  Betadine and saline    Amount of cleaning:  Extensive    Irrigation solution:  Sterile saline    Irrigation volume:  1000cc    Irrigation method:  Pressure wash  Skin repair:     Repair method:  Sutures    Suture size:  5-0    Suture material:  Nylon    Suture technique:  Simple  interrupted    Number of sutures:  4  Approximation:     Approximation:  Loose  Post-procedure details:     Dressing:  Antibiotic ointment and bulky dressing    Patient tolerance of procedure:  Tolerated well, no immediate complications         DATA     Results     None          Imaging Results    None         No orders to display       Scoring tools                                 ED Course & MDM   MDM / ED COURSE and CLINICAL IMPRESSIONS     MDM  Number of Diagnoses or Management Options     Amount and/or Complexity of Data Reviewed  Discuss the patient with other providers: yes    Patient Progress  Patient progress: stable      ED Course as of Jul 30 2035 Fri Jul 30, 2021 1955 Impression: Dog bite wound to right forearm with 4 cm laceration  Patient bit by her own dog, which she states is up-to-date with shots.  She states she is also up-to-date with her tetanus  No bony tenderness, swelling or deformity  Plan: Discussed with patient loose closure to reduce risk of infection, will start on Augmentin  Counseled on wound care at home with symptoms of infection, follow-up with PCP for wound care and suture removal and return precautions  Discussed with Dr. Campbell, she evaluated patient and agrees with plan    [EP]      ED Course User Index  [EP] Pallante, Elizabeth P, CRNP         Clinical Impressions as of Jul 30 2035   Dog bite, initial encounter            Pallante, Elizabeth P, CRNP  07/30/21 2035

## 2022-01-26 ENCOUNTER — TRANSCRIBE ORDERS (OUTPATIENT)
Dept: SCHEDULING | Age: 62
End: 2022-01-26

## 2022-01-26 DIAGNOSIS — Z12.31 ENCOUNTER FOR SCREENING MAMMOGRAM FOR MALIGNANT NEOPLASM OF BREAST: Primary | ICD-10-CM

## 2022-04-26 ENCOUNTER — TELEPHONE (OUTPATIENT)
Dept: PULMONOLOGY | Facility: HOSPITAL | Age: 62
End: 2022-04-26
Payer: COMMERCIAL

## 2022-04-26 VITALS — WEIGHT: 120 LBS | HEIGHT: 63 IN | BODY MASS INDEX: 21.26 KG/M2

## 2022-04-26 DIAGNOSIS — F17.210 CIGARETTE SMOKER: Primary | ICD-10-CM

## 2022-04-26 NOTE — TELEPHONE ENCOUNTER
Narcisa called to schedule her annual lung screening.  She was interviewed and sent to scheduling.

## 2022-04-28 ENCOUNTER — TRANSCRIBE ORDERS (OUTPATIENT)
Dept: SCHEDULING | Age: 62
End: 2022-04-28

## 2022-04-28 DIAGNOSIS — R92.8 OTHER ABNORMAL AND INCONCLUSIVE FINDINGS ON DIAGNOSTIC IMAGING OF BREAST: Primary | ICD-10-CM

## 2022-05-04 NOTE — TELEPHONE ENCOUNTER
I was notified by the pre- that Narcisa is capitated to [facility] for her radiology services and will be cancelled at [place].

## 2022-05-13 ENCOUNTER — HOSPITAL ENCOUNTER (OUTPATIENT)
Dept: RADIOLOGY | Facility: HOSPITAL | Age: 62
Discharge: HOME | End: 2022-05-13
Attending: FAMILY MEDICINE
Payer: COMMERCIAL

## 2022-05-13 DIAGNOSIS — R92.8 OTHER ABNORMAL AND INCONCLUSIVE FINDINGS ON DIAGNOSTIC IMAGING OF BREAST: ICD-10-CM

## 2022-05-13 PROCEDURE — G0279 TOMOSYNTHESIS, MAMMO: HCPCS

## 2022-05-13 PROCEDURE — 77066 DX MAMMO INCL CAD BI: CPT

## 2022-05-13 PROCEDURE — G0279 TOMOSYNTHESIS, MAMMO: HCPCS | Mod: RT

## 2022-05-18 ENCOUNTER — HOSPITAL ENCOUNTER (OUTPATIENT)
Dept: RADIOLOGY | Facility: HOSPITAL | Age: 62
Discharge: HOME | End: 2022-05-18
Attending: FAMILY MEDICINE
Payer: COMMERCIAL

## 2022-05-18 PROCEDURE — 76642 ULTRASOUND BREAST LIMITED: CPT | Mod: 50

## 2022-12-13 ENCOUNTER — HOSPITAL ENCOUNTER (OUTPATIENT)
Dept: RADIOLOGY | Facility: CLINIC | Age: 62
Discharge: HOME | End: 2022-12-13
Attending: INTERNAL MEDICINE
Payer: COMMERCIAL

## 2022-12-13 DIAGNOSIS — R91.1 SOLITARY PULMONARY NODULE: ICD-10-CM

## 2022-12-13 RX ORDER — FLUDEOXYGLUCOSE F18 300 MCI/ML
9.31 INJECTION INTRAVENOUS ONCE
Status: COMPLETED | OUTPATIENT
Start: 2022-12-13 | End: 2022-12-13

## 2022-12-13 RX ADMIN — FLUDEOXYGLUCOSE F18 9.31 MILLICURIE: 300 INJECTION INTRAVENOUS at 15:25

## 2022-12-23 ENCOUNTER — OFFICE VISIT (OUTPATIENT)
Dept: THORACIC SURGERY | Facility: CLINIC | Age: 62
End: 2022-12-23
Payer: COMMERCIAL

## 2022-12-23 VITALS
WEIGHT: 132 LBS | TEMPERATURE: 97.6 F | HEART RATE: 77 BPM | HEIGHT: 63 IN | BODY MASS INDEX: 23.39 KG/M2 | DIASTOLIC BLOOD PRESSURE: 82 MMHG | OXYGEN SATURATION: 96 % | RESPIRATION RATE: 20 BRPM | SYSTOLIC BLOOD PRESSURE: 134 MMHG

## 2022-12-23 DIAGNOSIS — R91.1 LUNG NODULE: Primary | ICD-10-CM

## 2022-12-23 PROCEDURE — 99204 OFFICE O/P NEW MOD 45 MIN: CPT | Performed by: SURGERY

## 2022-12-23 PROCEDURE — 3008F BODY MASS INDEX DOCD: CPT | Performed by: SURGERY

## 2022-12-23 NOTE — LETTER
2022     Floresita Patton MD  [address]  [facility] [address]    Patient: Narcisa Ramirez  YOB: 1960  Date of Visit: 2022      Dear Dr. Patton:    Thank you for referring Narcisa Ramirez to me for evaluation. Below are my notes for this consultation.    If you have questions, please do not hesitate to call me. I look forward to following your patient along with you.         Sincerely,        Manoj Cameron MD        CC: MD Elke Kearney, RRT  DO Nisha Morales Kareem, MD  2022  9:11 AM  Sign when Signing Visit  THORACIC SURGERY CONSULT NOTE      Patient ID: Narcisa Ramirez  : 1960  MRN: [id number]                                            Visit Date: 2022  Encounter Provider: Manoj Cameron  Referring Provider: Floresita Patton MD    Reason for consult: Right lower lobe lung nodule    HPI:  Narcisa Ramirez is a 62 y.o. female current active 25-pack-year smoker who began experiencing small-volume hemoptysis about 3 months ago.  She eventually saw her primary care physician where chest x-ray showed an abnormality in the right lower lobe followed by CT chest showing a 2.5 cm right lower lobe lung nodule.  Follow-up PET/CT showed been nodule to be significantly FDG avid with a mildly avid right hilar lymph node.  No sites of distant metastatic disease.    She had hemoptysis for about 3 months which stopped 2 weeks ago.  She denies fever chills sweats or weight loss.  Denies any chest pain.  Denies cough or wheeze.  She gets short of breath with 3-4 flights of stairs but not on flat surfaces.    She saw cardiologist previously for mitral valve prolapse.    She had a previous VATS right lower lobe wedge resection in 2014 for a different nodule that was benign.      Medical History:   Past Medical History:   Diagnosis Date   • COPD (chronic obstructive pulmonary disease) (CMS/HCC)    • Depression    • Lung nodule   "      Surgical History:   Past Surgical History:   Procedure Laterality Date   • LUNG SURGERY         Social History:   Social History     Socioeconomic History   • Marital status:      Spouse name: None   • Number of children: None   • Years of education: None   • Highest education level: None   Tobacco Use   • Smoking status: Former     Packs/day: 0.75     Years: 36.00     Pack years: 27.00     Types: Cigarettes     Start date:      Quit date: 2022     Years since quittin.8   • Smokeless tobacco: Never   • Tobacco comments:     never vaped   Substance and Sexual Activity   • Alcohol use: No   • Drug use: No       Family History:   Family History   Problem Relation Age of Onset   • Lung cancer Neg Hx        Home Medications:    Current Outpatient Medications:   •  umeclidinium 62.5 mcg/actuation blister with device, Inhale 62.5 mcg daily., Disp: , Rfl:   •  venlafaxine XR (EFFEXOR-XR) 150 mg 24 hr capsule, Take 150 mg by mouth daily., Disp: , Rfl:     Allergies: No Known Allergies    ROS:   Constitutional: Negative   HEENT: Negative  Cardiac: Negative  Pulmonary: Negative  GI: Negative  : Negative  Musculoskeletal: Negative  Derm: Negative  Neuro: Negative  Psych: Negative  Heme: Negative      Visit Vitals  /82 (BP Location: Right upper arm, Patient Position: Sitting)   Pulse 77   Temp 36.4 °C (97.6 °F)   Resp 20   Ht 1.6 m (5' 3\")   Wt 59.9 kg (132 lb)   SpO2 96%   BMI 23.38 kg/m²       Physical Exam  Constitutional: No acute distress. Alert and pleasant and appears stated age.   Eyes: Anicteric, equal and reactive.  Neck: No jugular venous distension. Trachea is midline. Thyroid is without nodularity or involvement. Normal movement with swallowing. No cervical adenopathy.   Respiratory: Clear to auscultation bilaterally to the bases with no wheezes, rhonchi, or crackles.   Cardiovascular: Regular rate and rhythm with No murmurs, gallops, or rubs.   Abdomen: Soft, non tender and non " distended with normal bowel sounds and no hepatosplenomegaly. No hernias.   Pulses: No carotid bruits, palpable distal radial pulses and distal lower extremity pulses.   Musculoskeletal: No clubbing, cyanosis, or edema. No petechiae. Full strength of all four extremities.   Neurologic: Cranial nerves are intact.   Lymph: No palpable cervical, supraclavicular, axillary, or inguinal lymphadenopathy      ECOG/Performace Status:  1 - Symptomatic but completely ambulatory      PFTs  FEV1 1.5 L    Radiology  All pertinent radiologic images and reports were personally reviewed by me.    CLINICAL HISTORY: R91.1: Solitary pulmonary nodule. Right lower lobe pulmonary  nodule.  No known malignancy. Initial treatment strategy.     COMMENT:     Comparison: Outside CT chest 11/17/2022, ultrasound abdomen and pelvis  9/23/2019, CT abdomen pelvis 5/18/2016, PET/CT 5/30/2014     Technique:  Administered radiotracer and dose: 9.31millicurie of fludeoxyglucose F-18 (FDG)  injection 9.31 millicurie  A PET/CT scan from the base of the skull to mid thigh was obtained on the  [employer] Vision Digital PET/CT approximately 58 minutes after injection.  The patient's fasting blood glucose level was 81 mg/dL. A low dose CT was  obtained of the same area without IV contrast for attenuation correction and  coregistration, not for a diagnostic scan.     Findings:  VISUALIZED BRAIN: Normal metabolic activity.     HEAD AND NECK: Normal physiologic uptake in the nasopharynx, oropharynx,  hypopharynx, and larynx. The parotid, submandibular, and lingual glands  demonstrate normal metabolic activity. The thyroid gland shows no hypermetabolic  nodules. There is no hypermetabolic cervical or supraclavicular lymphadenopathy.  Atherosclerotic calcifications at the carotid artery bifurcations.     CHEST: Background blood pool activity shows maximum SUV of 2.6. Normal  physiologic metabolic activity in the myocardium.  Atherosclerotic  calcifications  of the thoracic aorta.  There is a 2.6 x 2.5 cm nodule in the  right lower lobe (image 42) with maximum SUV of 16.2, which is new compared to  5/30/2014.  The previously seen mildly hypermetabolic nodule in the posterior  right lower lobe is no longer identified.  Mild centrilobular emphysema.  There  is linear atelectasis versus scarring and mild dependent hypoventilatory changes  at the lung bases.  There is mild hypermetabolic activity in the right hilum  with maximum SUV 3.0.  There is no hypermetabolic mediastinal, or axillary  lymphadenopathy. No abnormal activity in the esophagus. There is no abnormal  hypermetabolic activity in the breasts.     ABDOMEN AND PELVIS: Background liver activity shows maximum SUV of 3.1.  There  is normal, uniform metabolic activity in the liver and spleen. The gallbladder  is within normal limits. There is no hypermetabolic activity in the adrenal  glands or pancreas.  Small left lower pole renal cyst.  There is no  hypermetabolic mesenteric, retroperitoneal, iliac, or inguinal lymphadenopathy.  No abnormal uptake in the stomach. Nonspecific physiologic activity in the colon  and intestine is noted.  Colonic diverticulosis.  Atherosclerotic calcifications  of the aortoiliac system.     VISUALIZED MUSCULOSKELETAL SYSTEM: No hypermetabolic activity to suggest  osteolytic metastases or sclerosis to suggest osteoblastic metastases.  There  are degenerative changes in the spine and hips.     --  IMPRESSION:  Hypermetabolic right lower lobe pulmonary nodule measuring 2.6 cm with maximum  SUV of 16.2, suspicious for neoplasm.  Mild hypermetabolic activity in the right  hilum, for which metastatic disease is not excluded.      Assessment/Plan   62-year-old female current active 25-pack-year smoker began having hemoptysis 3 months ago and during work-up for this was found to have an FDG avid 2.5 cm right lower lobe lung nodule with mild FDG avidity in the right hilum.  I discussed with  the patient the neck step would be obtaining a tissue diagnosis and we discussed the 3 options to do so, namely percutaneous needle biopsy with radiology, Ion navigational bronchoscopy with needle biopsy, or surgical wedge resection.  I have recommended that we proceed with Ion navigational bronchoscopy to biopsy the right lower lobe lung nodule as well as concurrent EBUS to stage the mediastinum.  If the nodule proves malignancy in the mediastinum is negative, we would proceed with robotic right lower lobectomy and mediastinal lymph node dissection during the same anesthetic event.  Risks and benefits were discussed, namely the risks of bleeding, infection, pneumonia, atrial fibrillation, conversion to open thoracotomy, heart attack, stroke, death.  All of her questions were answered and she wishes to proceed with surgery as planned.  We will arrange for cardiac clearance and get her on the schedule.  I have suggested that she try as hard as possible to quit smoking for at least 2 weeks before surgery and I have given her a worksheet for online smoking cessation programs here at [facility].            Manoj Cameron MD

## 2022-12-23 NOTE — PROGRESS NOTES
THORACIC SURGERY CONSULT NOTE      Patient ID: Narcisa Ramirez  : 1960  MRN: [id number]                                            Visit Date: 2022  Encounter Provider: Manoj Cameron  Referring Provider: Floresita Patton MD    Reason for consult: Right lower lobe lung nodule    HPI:  Narcisa Ramirez is a 62 y.o. female current active 25-pack-year smoker who began experiencing small-volume hemoptysis about 3 months ago.  She eventually saw her primary care physician where chest x-ray showed an abnormality in the right lower lobe followed by CT chest showing a 2.5 cm right lower lobe lung nodule.  Follow-up PET/CT showed been nodule to be significantly FDG avid with a mildly avid right hilar lymph node.  No sites of distant metastatic disease.    She had hemoptysis for about 3 months which stopped 2 weeks ago.  She denies fever chills sweats or weight loss.  Denies any chest pain.  Denies cough or wheeze.  She gets short of breath with 3-4 flights of stairs but not on flat surfaces.    She saw cardiologist previously for mitral valve prolapse.    She had a previous VATS right lower lobe wedge resection in 2014 for a different nodule that was benign.      Medical History:   Past Medical History:   Diagnosis Date   • COPD (chronic obstructive pulmonary disease) (CMS/HCC)    • Depression    • Lung nodule        Surgical History:   Past Surgical History:   Procedure Laterality Date   • LUNG SURGERY         Social History:   Social History     Socioeconomic History   • Marital status:      Spouse name: None   • Number of children: None   • Years of education: None   • Highest education level: None   Tobacco Use   • Smoking status: Former     Packs/day: 0.75     Years: 36.00     Pack years: 27.00     Types: Cigarettes     Start date:      Quit date: 2022     Years since quittin.8   • Smokeless tobacco: Never   • Tobacco comments:     never vaped   Substance and Sexual Activity   • Alcohol  "use: No   • Drug use: No       Family History:   Family History   Problem Relation Age of Onset   • Lung cancer Neg Hx        Home Medications:    Current Outpatient Medications:   •  umeclidinium 62.5 mcg/actuation blister with device, Inhale 62.5 mcg daily., Disp: , Rfl:   •  venlafaxine XR (EFFEXOR-XR) 150 mg 24 hr capsule, Take 150 mg by mouth daily., Disp: , Rfl:     Allergies: No Known Allergies    ROS:   Constitutional: Negative   HEENT: Negative  Cardiac: Negative  Pulmonary: Negative  GI: Negative  : Negative  Musculoskeletal: Negative  Derm: Negative  Neuro: Negative  Psych: Negative  Heme: Negative      Visit Vitals  /82 (BP Location: Right upper arm, Patient Position: Sitting)   Pulse 77   Temp 36.4 °C (97.6 °F)   Resp 20   Ht 1.6 m (5' 3\")   Wt 59.9 kg (132 lb)   SpO2 96%   BMI 23.38 kg/m²       Physical Exam  Constitutional: No acute distress. Alert and pleasant and appears stated age.   Eyes: Anicteric, equal and reactive.  Neck: No jugular venous distension. Trachea is midline. Thyroid is without nodularity or involvement. Normal movement with swallowing. No cervical adenopathy.   Respiratory: Clear to auscultation bilaterally to the bases with no wheezes, rhonchi, or crackles.   Cardiovascular: Regular rate and rhythm with No murmurs, gallops, or rubs.   Abdomen: Soft, non tender and non distended with normal bowel sounds and no hepatosplenomegaly. No hernias.   Pulses: No carotid bruits, palpable distal radial pulses and distal lower extremity pulses.   Musculoskeletal: No clubbing, cyanosis, or edema. No petechiae. Full strength of all four extremities.   Neurologic: Cranial nerves are intact.   Lymph: No palpable cervical, supraclavicular, axillary, or inguinal lymphadenopathy      ECOG/Performace Status:  1 - Symptomatic but completely ambulatory      PFTs  FEV1 1.5 L    Radiology  All pertinent radiologic images and reports were personally reviewed by me.    CLINICAL HISTORY: R91.1: " Solitary pulmonary nodule. Right lower lobe pulmonary  nodule.  No known malignancy. Initial treatment strategy.     COMMENT:     Comparison: Outside CT chest 11/17/2022, ultrasound abdomen and pelvis  9/23/2019, CT abdomen pelvis 5/18/2016, PET/CT 5/30/2014     Technique:  Administered radiotracer and dose: 9.31millicurie of fludeoxyglucose F-18 (FDG)  injection 9.31 millicurie  A PET/CT scan from the base of the skull to mid thigh was obtained on the  [employer] Vision Digital PET/CT approximately 58 minutes after injection.  The patient's fasting blood glucose level was 81 mg/dL. A low dose CT was  obtained of the same area without IV contrast for attenuation correction and  coregistration, not for a diagnostic scan.     Findings:  VISUALIZED BRAIN: Normal metabolic activity.     HEAD AND NECK: Normal physiologic uptake in the nasopharynx, oropharynx,  hypopharynx, and larynx. The parotid, submandibular, and lingual glands  demonstrate normal metabolic activity. The thyroid gland shows no hypermetabolic  nodules. There is no hypermetabolic cervical or supraclavicular lymphadenopathy.  Atherosclerotic calcifications at the carotid artery bifurcations.     CHEST: Background blood pool activity shows maximum SUV of 2.6. Normal  physiologic metabolic activity in the myocardium.  Atherosclerotic  calcifications of the thoracic aorta.  There is a 2.6 x 2.5 cm nodule in the  right lower lobe (image 42) with maximum SUV of 16.2, which is new compared to  5/30/2014.  The previously seen mildly hypermetabolic nodule in the posterior  right lower lobe is no longer identified.  Mild centrilobular emphysema.  There  is linear atelectasis versus scarring and mild dependent hypoventilatory changes  at the lung bases.  There is mild hypermetabolic activity in the right hilum  with maximum SUV 3.0.  There is no hypermetabolic mediastinal, or axillary  lymphadenopathy. No abnormal activity in the esophagus. There is no  abnormal  hypermetabolic activity in the breasts.     ABDOMEN AND PELVIS: Background liver activity shows maximum SUV of 3.1.  There  is normal, uniform metabolic activity in the liver and spleen. The gallbladder  is within normal limits. There is no hypermetabolic activity in the adrenal  glands or pancreas.  Small left lower pole renal cyst.  There is no  hypermetabolic mesenteric, retroperitoneal, iliac, or inguinal lymphadenopathy.  No abnormal uptake in the stomach. Nonspecific physiologic activity in the colon  and intestine is noted.  Colonic diverticulosis.  Atherosclerotic calcifications  of the aortoiliac system.     VISUALIZED MUSCULOSKELETAL SYSTEM: No hypermetabolic activity to suggest  osteolytic metastases or sclerosis to suggest osteoblastic metastases.  There  are degenerative changes in the spine and hips.     --  IMPRESSION:  Hypermetabolic right lower lobe pulmonary nodule measuring 2.6 cm with maximum  SUV of 16.2, suspicious for neoplasm.  Mild hypermetabolic activity in the right  hilum, for which metastatic disease is not excluded.      Assessment/Plan   62-year-old female current active 25-pack-year smoker began having hemoptysis 3 months ago and during work-up for this was found to have an FDG avid 2.5 cm right lower lobe lung nodule with mild FDG avidity in the right hilum.  I discussed with the patient the neck step would be obtaining a tissue diagnosis and we discussed the 3 options to do so, namely percutaneous needle biopsy with radiology, Ion navigational bronchoscopy with needle biopsy, or surgical wedge resection.  I have recommended that we proceed with Ion navigational bronchoscopy to biopsy the right lower lobe lung nodule as well as concurrent EBUS to stage the mediastinum.  If the nodule proves malignancy in the mediastinum is negative, we would proceed with robotic right lower lobectomy and mediastinal lymph node dissection during the same anesthetic event.  Risks and benefits  were discussed, namely the risks of bleeding, infection, pneumonia, atrial fibrillation, conversion to open thoracotomy, heart attack, stroke, death.  All of her questions were answered and she wishes to proceed with surgery as planned.  We will arrange for cardiac clearance and get her on the schedule.  I have suggested that she try as hard as possible to quit smoking for at least 2 weeks before surgery and I have given her a worksheet for online smoking cessation programs here at [facility].             Manoj Cameron MD

## 2023-01-10 ENCOUNTER — PREP FOR CASE (OUTPATIENT)
Dept: THORACIC SURGERY | Facility: CLINIC | Age: 63
End: 2023-01-10
Payer: COMMERCIAL

## 2023-01-10 RX ORDER — TRAMADOL HYDROCHLORIDE 50 MG/1
50 TABLET ORAL
Status: CANCELLED | OUTPATIENT
Start: 2023-01-10

## 2023-01-10 RX ORDER — ALBUTEROL SULFATE 90 UG/1
2 INHALANT RESPIRATORY (INHALATION) ONCE
Status: CANCELLED | OUTPATIENT
Start: 2023-01-10 | End: 2023-01-11

## 2023-01-10 RX ORDER — SODIUM CHLORIDE 9 MG/ML
INJECTION, SOLUTION INTRAVENOUS CONTINUOUS
Status: CANCELLED | OUTPATIENT
Start: 2023-01-10 | End: 2023-01-17

## 2023-01-10 RX ORDER — GABAPENTIN 100 MG/1
300 CAPSULE ORAL
Status: CANCELLED | OUTPATIENT
Start: 2023-01-10

## 2023-01-10 RX ORDER — ACETAMINOPHEN 325 MG/1
975 TABLET ORAL
Status: CANCELLED | OUTPATIENT
Start: 2023-01-10

## 2023-02-03 ENCOUNTER — APPOINTMENT (OUTPATIENT)
Dept: LAB | Facility: HOSPITAL | Age: 63
DRG: 164 | End: 2023-02-03
Attending: PHYSICIAN ASSISTANT
Payer: COMMERCIAL

## 2023-02-03 DIAGNOSIS — R91.1 LUNG NODULE: ICD-10-CM

## 2023-02-03 LAB
ABO + RH BLD: NORMAL
ANION GAP SERPL CALC-SCNC: 12 MEQ/L (ref 3–15)
APTT PPP: 31 SEC (ref 23–35)
BLD GP AB SCN SERPL QL: NEGATIVE
BLOOD BANK CMNT PATIENT-IMP: NORMAL
BUN SERPL-MCNC: 9 MG/DL (ref 8–20)
CALCIUM SERPL-MCNC: 9.5 MG/DL (ref 8.9–10.3)
CHLORIDE SERPL-SCNC: 104 MEQ/L (ref 98–109)
CO2 SERPL-SCNC: 23 MEQ/L (ref 22–32)
CREAT SERPL-MCNC: 1 MG/DL (ref 0.6–1.1)
D AG BLD QL: POSITIVE
ERYTHROCYTE [DISTWIDTH] IN BLOOD BY AUTOMATED COUNT: 12.5 % (ref 11.7–14.4)
GFR SERPL CREATININE-BSD FRML MDRD: 55.8 ML/MIN/1.73M*2
GLUCOSE SERPL-MCNC: 90 MG/DL (ref 70–99)
HCT VFR BLDCO AUTO: 41.3 % (ref 35–45)
HGB BLD-MCNC: 13.7 G/DL (ref 11.8–15.7)
INR PPP: 1.1
LABORATORY COMMENT REPORT: NORMAL
MCH RBC QN AUTO: 31.3 PG (ref 28–33.2)
MCHC RBC AUTO-ENTMCNC: 33.2 G/DL (ref 32.2–35.5)
MCV RBC AUTO: 94.3 FL (ref 83–98)
PDW BLD AUTO: 9.5 FL (ref 9.4–12.3)
PLATELET # BLD AUTO: 361 K/UL (ref 150–369)
POTASSIUM SERPL-SCNC: 4.3 MEQ/L (ref 3.6–5.1)
PROTHROMBIN TIME: 13.7 SEC (ref 12.2–14.5)
RBC # BLD AUTO: 4.38 M/UL (ref 3.93–5.22)
SODIUM SERPL-SCNC: 139 MEQ/L (ref 136–144)
SPECIMEN EXP DATE BLD: NORMAL
WBC # BLD AUTO: 8.79 K/UL (ref 3.8–10.5)

## 2023-02-03 PROCEDURE — 86900 BLOOD TYPING SEROLOGIC ABO: CPT

## 2023-02-03 PROCEDURE — 80048 BASIC METABOLIC PNL TOTAL CA: CPT

## 2023-02-03 PROCEDURE — 86850 RBC ANTIBODY SCREEN: CPT

## 2023-02-03 PROCEDURE — 86901 BLOOD TYPING SEROLOGIC RH(D): CPT

## 2023-02-03 PROCEDURE — 85610 PROTHROMBIN TIME: CPT

## 2023-02-03 PROCEDURE — 85730 THROMBOPLASTIN TIME PARTIAL: CPT

## 2023-02-03 PROCEDURE — 85027 COMPLETE CBC AUTOMATED: CPT

## 2023-02-03 PROCEDURE — 36415 COLL VENOUS BLD VENIPUNCTURE: CPT

## 2023-02-08 ENCOUNTER — APPOINTMENT (OUTPATIENT)
Dept: PREADMISSION TESTING | Facility: HOSPITAL | Age: 63
End: 2023-02-08
Payer: COMMERCIAL

## 2023-02-08 VITALS — BODY MASS INDEX: 21.26 KG/M2 | WEIGHT: 120 LBS | HEIGHT: 63 IN

## 2023-02-08 ASSESSMENT — PAIN SCALES - GENERAL: PAINLEVEL: 0-NO PAIN

## 2023-02-08 NOTE — PRE-PROCEDURE INSTRUCTIONS
1.      You will be called between 3pm -7pm one business day before your procedure  February 14, 2023   to tell you where and when to report.  If you do not receive a phone call by 6pm, please call the Admissions office at [phone] to determine the arrival time for your procedure.      2.        Please report to Admissions or Short Procedure Unit, park in lot [address], on the day of your procedure.  Detailed directions will be given to you when you are called with arrival time.      3.      No solid food for EIGHT HOURS prior to surgery- No food after midnight.    Unlimited CLEAR liquids, meaning water or PLAIN black coffee (WITHOUT any milk, cream, sugar, or sweetener) are permitted up to TWO HOURS prior to arrival at the hospital.     4.      Early on the morning of the procedure please take your usual dose of the listed medications with a sip of water:  Stop all vitamins, supplements, NSAIDS, motrin, aleve and advil today.  The morning of the procedure ou may take:  Ellipta  Effexor     5.      Other Instructions: You may brush your teeth the morning of the procedure. Rinse and spit, do not swallow.  Bring a list of your medications with dosages with you.  Use surgical wash as directed.   6.      If you develop a cold, cough, fever, rash, or other symptom prior to the data of the procedure, please report it to your physician immediately.     7.      If you need to cancel the procedure for any reason, please contact your physician.     8.      Make arrangements to have someone drive you home from the procedure. If you have not arranged for transportation home, your surgery may be cancelled.      9.      You may not take public transportation unless you are accompanied by a responsible person.     10.      You may not drive a car or operate complex or potentially dangerous machinery for 24 hours following anesthesia and/or sedation.      11.      12.      If it is medically necessary for you to have a longer stay, you  will be informed as soon as the decision is made.              Only bring essential items to the hospital.  Do not wear or bring anything of value to the hospital including jewelry of any kind, money, or wallet. Do not wear make-up or contact lenses. Do not BRING MEDICATIONS FROM HOME unless instructed to do so.  DO bring your hearing aids, glasses, and a case.        13.      No lotion, creams, powders, or oils on skin the morning of procedure        14.      Dress in comfortable clothes.     15.      If instructed, please bring a copy of your Advanced Directive (Living Will/Durable Power of ) on the day of your procedure.      16.      17.            18.       19.       Patients need to quarantine from the time of PAT COVID test to day of surgery, regardless of COVID vaccine status.         Ensuring your safety at all times is a very important part of out [facility] Culture of Safety . After having surgery and sedation, you are at risk for falling and balance issues.  Although you may feel awake, the effects of the medication can last up to 24 hours after anesthesia.  If you need to use the bathroom during your recovery period, nursing staff will escort you there and stay with you to ensure your safety.          Refrain from drinking alcohol and smoking cigarettes for 24 hours prior to surgery.         Shower with antibacterial soap (DIAL) the night before and morning of your procedure.  If your procedure indicates the need for CHG antiseptic was (Bactoshield or Hibiclens), please use this instead and follow instructions as discussed at the time of your Pre-Admission Testing visit or phone interview.     Above instructions reviewed with patient and patient acknowledges understanding.

## 2023-02-13 ENCOUNTER — APPOINTMENT (OUTPATIENT)
Dept: LAB | Facility: HOSPITAL | Age: 63
End: 2023-02-13
Attending: PHYSICIAN ASSISTANT
Payer: COMMERCIAL

## 2023-02-13 DIAGNOSIS — R91.1 LUNG NODULE: ICD-10-CM

## 2023-02-13 LAB — SARS-COV-2 RNA RESP QL NAA+PROBE: NEGATIVE

## 2023-02-13 PROCEDURE — C9803 HOPD COVID-19 SPEC COLLECT: HCPCS

## 2023-02-13 PROCEDURE — U0003 INFECTIOUS AGENT DETECTION BY NUCLEIC ACID (DNA OR RNA); SEVERE ACUTE RESPIRATORY SYNDROME CORONAVIRUS 2 (SARS-COV-2) (CORONAVIRUS DISEASE [COVID-19]), AMPLIFIED PROBE TECHNIQUE, MAKING USE OF HIGH THROUGHPUT TECHNOLOGIES AS DESCRIBED BY CMS-2020-01-R: HCPCS

## 2023-02-14 ENCOUNTER — ANESTHESIA EVENT (INPATIENT)
Dept: OPERATING ROOM | Facility: HOSPITAL | Age: 63
DRG: 164 | End: 2023-02-14
Payer: COMMERCIAL

## 2023-02-14 RX ORDER — PHENYLEPHRINE HCL IN 0.9% NACL 50MG/250ML
10-200 PLASTIC BAG, INJECTION (ML) INTRAVENOUS
Status: DISPENSED | OUTPATIENT
Start: 2023-02-15 | End: 2023-02-15

## 2023-02-14 ASSESSMENT — COPD QUESTIONNAIRES: COPD: 1

## 2023-02-14 NOTE — ANESTHESIA PREPROCEDURE EVALUATION
Relevant Problems   No relevant active problems       Anesthesia ROS/MED HX      Pulmonary    history of tobacco use (recently quit)   COPD  Neuro/Psych - neg  Cardiovascular   Cardiac clearance reviewed, Covid19 Test Reviewed and ECG reviewed  Hematological - neg  GI/Hepatic- neg  Renal Disease- neg  Endo/Other- neg  ROS/MED HX Comments:    Pulmonary: Lung nodule       Past Surgical History:   Procedure Laterality Date   • LUNG SURGERY         Physical Exam    Airway   Mallampati: II   TM distance: >3 FB   Neck ROM: full  Cardiovascular - normal   Rhythm: regular   Rate: normalPulmonary - normal   clear to auscultation  Dental - normal        Anesthesia Plan    Plan: general     Lines and Monitors: additional IV, arterial line and PIV     Airway: oral intubation   ASA 3  Blood Products:     Use of Blood Products Discussed: Yes   Anesthetic plan and risks discussed with: patient  Postop Plan:    Trial extubation   Patient Disposition: inpatient floor planned admission   Pain Management: IV analgesics  Comments:    Plan: A-line if proceding to VATS

## 2023-02-15 ENCOUNTER — APPOINTMENT (INPATIENT)
Dept: RADIOLOGY | Facility: HOSPITAL | Age: 63
DRG: 164 | End: 2023-02-15
Attending: STUDENT IN AN ORGANIZED HEALTH CARE EDUCATION/TRAINING PROGRAM
Payer: COMMERCIAL

## 2023-02-15 ENCOUNTER — APPOINTMENT (INPATIENT)
Dept: RADIOLOGY | Facility: HOSPITAL | Age: 63
DRG: 164 | End: 2023-02-15
Attending: SURGERY
Payer: COMMERCIAL

## 2023-02-15 ENCOUNTER — ANESTHESIA (INPATIENT)
Dept: OPERATING ROOM | Facility: HOSPITAL | Age: 63
DRG: 164 | End: 2023-02-15
Payer: COMMERCIAL

## 2023-02-15 ENCOUNTER — HOSPITAL ENCOUNTER (INPATIENT)
Facility: HOSPITAL | Age: 63
LOS: 8 days | Discharge: HOME | DRG: 164 | End: 2023-02-23
Attending: SURGERY | Admitting: SURGERY
Payer: COMMERCIAL

## 2023-02-15 DIAGNOSIS — R91.1 PULMONARY NODULE: ICD-10-CM

## 2023-02-15 DIAGNOSIS — R91.1 RIGHT LOWER LOBE PULMONARY NODULE: Primary | ICD-10-CM

## 2023-02-15 LAB
ANION GAP SERPL CALC-SCNC: 8 MEQ/L (ref 3–15)
BUN SERPL-MCNC: 9 MG/DL (ref 8–20)
CALCIUM SERPL-MCNC: 7.6 MG/DL (ref 8.9–10.3)
CASE RPRT: NORMAL
CHLORIDE SERPL-SCNC: 114 MEQ/L (ref 98–109)
CLIN PATH EVAL NOTE: NORMAL
CLINICAL INFO: NORMAL
CO2 SERPL-SCNC: 18 MEQ/L (ref 22–32)
CREAT SERPL-MCNC: 1 MG/DL (ref 0.6–1.1)
ERYTHROCYTE [DISTWIDTH] IN BLOOD BY AUTOMATED COUNT: 12.6 % (ref 11.7–14.4)
GFR SERPL CREATININE-BSD FRML MDRD: 55.8 ML/MIN/1.73M*2
GLUCOSE SERPL-MCNC: 147 MG/DL (ref 70–99)
HCT VFR BLDCO AUTO: 36.9 % (ref 35–45)
HGB BLD-MCNC: 12 G/DL (ref 11.8–15.7)
MCH RBC QN AUTO: 31.5 PG (ref 28–33.2)
MCHC RBC AUTO-ENTMCNC: 32.5 G/DL (ref 32.2–35.5)
MCV RBC AUTO: 96.9 FL (ref 83–98)
PATH REPORT.FINAL DX SPEC: NORMAL
PATH REPORT.GROSS SPEC: NORMAL
PDW BLD AUTO: 9 FL (ref 9.4–12.3)
PLATELET # BLD AUTO: 280 K/UL (ref 150–369)
POTASSIUM SERPL-SCNC: 4.5 MEQ/L (ref 3.6–5.1)
RBC # BLD AUTO: 3.81 M/UL (ref 3.93–5.22)
SODIUM SERPL-SCNC: 140 MEQ/L (ref 136–144)
WBC # BLD AUTO: 11.97 K/UL (ref 3.8–10.5)

## 2023-02-15 PROCEDURE — 85027 COMPLETE CBC AUTOMATED: CPT | Performed by: PHYSICIAN ASSISTANT

## 2023-02-15 PROCEDURE — 63700000 HC SELF-ADMINISTRABLE DRUG

## 2023-02-15 PROCEDURE — 36000002 HC OR LEVEL 2 INITIAL 30MIN: Performed by: SURGERY

## 2023-02-15 PROCEDURE — 36000012 HC OR LEVEL 2 EA ADDL MIN: Performed by: SURGERY

## 2023-02-15 PROCEDURE — 63600000 HC DRUGS/DETAIL CODE

## 2023-02-15 PROCEDURE — 63600000 HC DRUGS/DETAIL CODE: Performed by: PHYSICIAN ASSISTANT

## 2023-02-15 PROCEDURE — 63700000 HC SELF-ADMINISTRABLE DRUG: Performed by: PHYSICIAN ASSISTANT

## 2023-02-15 PROCEDURE — 88341 IMHCHEM/IMCYTCHM EA ADD ANTB: CPT | Performed by: SURGERY

## 2023-02-15 PROCEDURE — C9290 INJ, BUPIVACAINE LIPOSOME: HCPCS | Performed by: SURGERY

## 2023-02-15 PROCEDURE — C1729 CATH, DRAINAGE: HCPCS | Performed by: SURGERY

## 2023-02-15 PROCEDURE — 88333 PATH CONSLTJ SURG CYTO XM 1: CPT | Performed by: SURGERY

## 2023-02-15 PROCEDURE — 32663 THORACOSCOPY W/LOBECTOMY: CPT | Mod: 82AS,RT | Performed by: PHYSICIAN ASSISTANT

## 2023-02-15 PROCEDURE — 31645 BRNCHSC W/THER ASPIR 1ST: CPT

## 2023-02-15 PROCEDURE — 0BTF4ZZ RESECTION OF RIGHT LOWER LUNG LOBE, PERCUTANEOUS ENDOSCOPIC APPROACH: ICD-10-PCS | Performed by: SURGERY

## 2023-02-15 PROCEDURE — 80048 BASIC METABOLIC PNL TOTAL CA: CPT | Performed by: PHYSICIAN ASSISTANT

## 2023-02-15 PROCEDURE — 25000000 HC PHARMACY GENERAL: Performed by: NURSE ANESTHETIST, CERTIFIED REGISTERED

## 2023-02-15 PROCEDURE — 63600000 HC DRUGS/DETAIL CODE: Performed by: SURGERY

## 2023-02-15 PROCEDURE — 0BDF8ZX EXTRACTION OF RIGHT LOWER LUNG LOBE, VIA NATURAL OR ARTIFICIAL OPENING ENDOSCOPIC, DIAGNOSTIC: ICD-10-PCS | Performed by: INTERNAL MEDICINE

## 2023-02-15 PROCEDURE — 27200000 HC STERILE SUPPLY: Performed by: SURGERY

## 2023-02-15 PROCEDURE — 63600000 HC DRUGS/DETAIL CODE: Performed by: NURSE ANESTHETIST, CERTIFIED REGISTERED

## 2023-02-15 PROCEDURE — 88331 PATH CONSLTJ SURG 1 BLK 1SPC: CPT | Performed by: PATHOLOGY

## 2023-02-15 PROCEDURE — 07B74ZX EXCISION OF THORAX LYMPHATIC, PERCUTANEOUS ENDOSCOPIC APPROACH, DIAGNOSTIC: ICD-10-PCS | Performed by: SURGERY

## 2023-02-15 PROCEDURE — PBSUR OPERATIVE NOTE CHARGE: Performed by: STUDENT IN AN ORGANIZED HEALTH CARE EDUCATION/TRAINING PROGRAM

## 2023-02-15 PROCEDURE — 71000011 HC PACU PHASE 1 EA ADDL MIN: Performed by: SURGERY

## 2023-02-15 PROCEDURE — 71000001 HC PACU PHASE 1 INITIAL 30MIN: Performed by: SURGERY

## 2023-02-15 PROCEDURE — 8E0W4CZ ROBOTIC ASSISTED PROCEDURE OF TRUNK REGION, PERCUTANEOUS ENDOSCOPIC APPROACH: ICD-10-PCS | Performed by: SURGERY

## 2023-02-15 PROCEDURE — 25000000 HC PHARMACY GENERAL: Performed by: SURGERY

## 2023-02-15 PROCEDURE — 32674 THORACOSCOPY LYMPH NODE EXC: CPT | Performed by: SURGERY

## 2023-02-15 PROCEDURE — 25800000 HC PHARMACY IV SOLUTIONS: Performed by: PHYSICIAN ASSISTANT

## 2023-02-15 PROCEDURE — 25000000 HC PHARMACY GENERAL

## 2023-02-15 PROCEDURE — 36415 COLL VENOUS BLD VENIPUNCTURE: CPT | Performed by: PHYSICIAN ASSISTANT

## 2023-02-15 PROCEDURE — 37000001 HC ANESTHESIA GENERAL: Performed by: SURGERY

## 2023-02-15 PROCEDURE — 200200 PR NO CHARGE: Performed by: SURGERY

## 2023-02-15 PROCEDURE — 32663 THORACOSCOPY W/LOBECTOMY: CPT | Mod: 22,RT | Performed by: SURGERY

## 2023-02-15 PROCEDURE — 0BBD4ZZ EXCISION OF RIGHT MIDDLE LUNG LOBE, PERCUTANEOUS ENDOSCOPIC APPROACH: ICD-10-PCS | Performed by: SURGERY

## 2023-02-15 PROCEDURE — 21400000 HC ROOM AND CARE CCU/INTERMEDIATE

## 2023-02-15 PROCEDURE — 32674 THORACOSCOPY LYMPH NODE EXC: CPT | Mod: 82AS | Performed by: PHYSICIAN ASSISTANT

## 2023-02-15 PROCEDURE — 25800000 HC PHARMACY IV SOLUTIONS: Performed by: SURGERY

## 2023-02-15 PROCEDURE — 71045 X-RAY EXAM CHEST 1 VIEW: CPT

## 2023-02-15 PROCEDURE — 31652 BRONCH EBUS SAMPLNG 1/2 NODE: CPT

## 2023-02-15 RX ORDER — DOCUSATE SODIUM 100 MG/1
100 CAPSULE, LIQUID FILLED ORAL 3 TIMES DAILY
Status: DISCONTINUED | OUTPATIENT
Start: 2023-02-15 | End: 2023-02-23 | Stop reason: HOSPADM

## 2023-02-15 RX ORDER — PROPOFOL 10 MG/ML
INJECTION, EMULSION INTRAVENOUS CONTINUOUS PRN
Status: DISCONTINUED | OUTPATIENT
Start: 2023-02-15 | End: 2023-02-15 | Stop reason: SURG

## 2023-02-15 RX ORDER — GLYCOPYRROLATE 0.6MG/3ML
SYRINGE (ML) INTRAVENOUS AS NEEDED
Status: DISCONTINUED | OUTPATIENT
Start: 2023-02-15 | End: 2023-02-15 | Stop reason: SURG

## 2023-02-15 RX ORDER — DEXTROSE 50 % IN WATER (D50W) INTRAVENOUS SYRINGE
25 AS NEEDED
Status: DISCONTINUED | OUTPATIENT
Start: 2023-02-15 | End: 2023-02-23 | Stop reason: HOSPADM

## 2023-02-15 RX ORDER — HYDROMORPHONE HYDROCHLORIDE 1 MG/ML
0.5 INJECTION, SOLUTION INTRAMUSCULAR; INTRAVENOUS; SUBCUTANEOUS
Status: DISCONTINUED | OUTPATIENT
Start: 2023-02-15 | End: 2023-02-15 | Stop reason: HOSPADM

## 2023-02-15 RX ORDER — ALBUTEROL SULFATE 90 UG/1
2 INHALANT RESPIRATORY (INHALATION) ONCE
Status: DISCONTINUED | OUTPATIENT
Start: 2023-02-15 | End: 2023-02-15 | Stop reason: HOSPADM

## 2023-02-15 RX ORDER — TRAMADOL HYDROCHLORIDE 50 MG/1
50 TABLET ORAL
Status: COMPLETED | OUTPATIENT
Start: 2023-02-15 | End: 2023-02-15

## 2023-02-15 RX ORDER — LIDOCAINE HYDROCHLORIDE ANHYDROUS AND DEXTROSE MONOHYDRATE .8; 5 G/100ML; G/100ML
INJECTION, SOLUTION INTRAVENOUS CONTINUOUS PRN
Status: DISCONTINUED | OUTPATIENT
Start: 2023-02-15 | End: 2023-02-15 | Stop reason: SURG

## 2023-02-15 RX ORDER — PHENYLEPHRINE HYDROCHLORIDE 10 MG/ML
INJECTION INTRAVENOUS CONTINUOUS PRN
Status: DISCONTINUED | OUTPATIENT
Start: 2023-02-15 | End: 2023-02-15 | Stop reason: SURG

## 2023-02-15 RX ORDER — KETOROLAC TROMETHAMINE 15 MG/ML
15 INJECTION, SOLUTION INTRAMUSCULAR; INTRAVENOUS EVERY 6 HOURS
Status: DISPENSED | OUTPATIENT
Start: 2023-02-15 | End: 2023-02-17

## 2023-02-15 RX ORDER — ACETAMINOPHEN 325 MG/1
975 TABLET ORAL
Status: COMPLETED | OUTPATIENT
Start: 2023-02-15 | End: 2023-02-15

## 2023-02-15 RX ORDER — ONDANSETRON HYDROCHLORIDE 2 MG/ML
4 INJECTION, SOLUTION INTRAVENOUS
Status: DISCONTINUED | OUTPATIENT
Start: 2023-02-15 | End: 2023-02-15 | Stop reason: HOSPADM

## 2023-02-15 RX ORDER — BUPIVACAINE HYDROCHLORIDE 5 MG/ML
INJECTION, SOLUTION PERINEURAL
Status: DISCONTINUED | OUTPATIENT
Start: 2023-02-15 | End: 2023-02-15 | Stop reason: HOSPADM

## 2023-02-15 RX ORDER — SENNOSIDES 8.6 MG/1
1 TABLET ORAL NIGHTLY
Status: DISCONTINUED | OUTPATIENT
Start: 2023-02-15 | End: 2023-02-23 | Stop reason: HOSPADM

## 2023-02-15 RX ORDER — TRAMADOL HYDROCHLORIDE 50 MG/1
50 TABLET ORAL EVERY 6 HOURS PRN
Status: DISCONTINUED | OUTPATIENT
Start: 2023-02-15 | End: 2023-02-23 | Stop reason: HOSPADM

## 2023-02-15 RX ORDER — DEXAMETHASONE SODIUM PHOSPHATE 4 MG/ML
INJECTION, SOLUTION INTRA-ARTICULAR; INTRALESIONAL; INTRAMUSCULAR; INTRAVENOUS; SOFT TISSUE AS NEEDED
Status: DISCONTINUED | OUTPATIENT
Start: 2023-02-15 | End: 2023-02-15 | Stop reason: SURG

## 2023-02-15 RX ORDER — ROCURONIUM BROMIDE 10 MG/ML
INJECTION, SOLUTION INTRAVENOUS AS NEEDED
Status: DISCONTINUED | OUTPATIENT
Start: 2023-02-15 | End: 2023-02-15 | Stop reason: SURG

## 2023-02-15 RX ORDER — SODIUM CHLORIDE 9 MG/ML
INJECTION, SOLUTION INTRAVENOUS CONTINUOUS
Status: DISCONTINUED | OUTPATIENT
Start: 2023-02-15 | End: 2023-02-15

## 2023-02-15 RX ORDER — HEPARIN SODIUM 5000 [USP'U]/ML
5000 INJECTION, SOLUTION INTRAVENOUS; SUBCUTANEOUS EVERY 8 HOURS
Status: DISCONTINUED | OUTPATIENT
Start: 2023-02-16 | End: 2023-02-23 | Stop reason: HOSPADM

## 2023-02-15 RX ORDER — GABAPENTIN 300 MG/1
300 CAPSULE ORAL 3 TIMES DAILY
Status: DISCONTINUED | OUTPATIENT
Start: 2023-02-15 | End: 2023-02-23 | Stop reason: HOSPADM

## 2023-02-15 RX ORDER — CETIRIZINE HYDROCHLORIDE 10 MG/1
10 TABLET ORAL DAILY
COMMUNITY

## 2023-02-15 RX ORDER — LIDOCAINE HCL/PF 100 MG/5ML
SYRINGE (ML) INTRAVENOUS AS NEEDED
Status: DISCONTINUED | OUTPATIENT
Start: 2023-02-15 | End: 2023-02-15 | Stop reason: SURG

## 2023-02-15 RX ORDER — LABETALOL HCL 20 MG/4 ML
SYRINGE (ML) INTRAVENOUS AS NEEDED
Status: DISCONTINUED | OUTPATIENT
Start: 2023-02-15 | End: 2023-02-15 | Stop reason: SURG

## 2023-02-15 RX ORDER — HYDROMORPHONE HYDROCHLORIDE 1 MG/ML
0.25 INJECTION, SOLUTION INTRAMUSCULAR; INTRAVENOUS; SUBCUTANEOUS
Status: DISCONTINUED | OUTPATIENT
Start: 2023-02-15 | End: 2023-02-16

## 2023-02-15 RX ORDER — FENTANYL CITRATE 50 UG/ML
50 INJECTION, SOLUTION INTRAMUSCULAR; INTRAVENOUS EVERY 5 MIN PRN
Status: DISCONTINUED | OUTPATIENT
Start: 2023-02-15 | End: 2023-02-15 | Stop reason: HOSPADM

## 2023-02-15 RX ORDER — FENTANYL CITRATE 50 UG/ML
INJECTION, SOLUTION INTRAMUSCULAR; INTRAVENOUS AS NEEDED
Status: DISCONTINUED | OUTPATIENT
Start: 2023-02-15 | End: 2023-02-15 | Stop reason: SURG

## 2023-02-15 RX ORDER — ONDANSETRON HYDROCHLORIDE 2 MG/ML
4 INJECTION, SOLUTION INTRAVENOUS EVERY 8 HOURS PRN
Status: DISCONTINUED | OUTPATIENT
Start: 2023-02-15 | End: 2023-02-23 | Stop reason: HOSPADM

## 2023-02-15 RX ORDER — KETAMINE HYDROCHLORIDE 10 MG/ML
INJECTION, SOLUTION INTRAMUSCULAR; INTRAVENOUS AS NEEDED
Status: DISCONTINUED | OUTPATIENT
Start: 2023-02-15 | End: 2023-02-15 | Stop reason: SURG

## 2023-02-15 RX ORDER — DEXTROSE 40 %
15-30 GEL (GRAM) ORAL AS NEEDED
Status: DISCONTINUED | OUTPATIENT
Start: 2023-02-15 | End: 2023-02-23 | Stop reason: HOSPADM

## 2023-02-15 RX ORDER — HYDROMORPHONE HYDROCHLORIDE 1 MG/ML
INJECTION, SOLUTION INTRAMUSCULAR; INTRAVENOUS; SUBCUTANEOUS AS NEEDED
Status: DISCONTINUED | OUTPATIENT
Start: 2023-02-15 | End: 2023-02-15 | Stop reason: SURG

## 2023-02-15 RX ORDER — SODIUM CHLORIDE, SODIUM LACTATE, POTASSIUM CHLORIDE, CALCIUM CHLORIDE 600; 310; 30; 20 MG/100ML; MG/100ML; MG/100ML; MG/100ML
INJECTION, SOLUTION INTRAVENOUS CONTINUOUS
Status: DISCONTINUED | OUTPATIENT
Start: 2023-02-15 | End: 2023-02-16

## 2023-02-15 RX ORDER — ONDANSETRON HYDROCHLORIDE 2 MG/ML
INJECTION, SOLUTION INTRAVENOUS AS NEEDED
Status: DISCONTINUED | OUTPATIENT
Start: 2023-02-15 | End: 2023-02-15 | Stop reason: SURG

## 2023-02-15 RX ORDER — CETIRIZINE HYDROCHLORIDE 10 MG/1
10 TABLET ORAL DAILY
Status: DISCONTINUED | OUTPATIENT
Start: 2023-02-16 | End: 2023-02-23 | Stop reason: HOSPADM

## 2023-02-15 RX ORDER — VENLAFAXINE HYDROCHLORIDE 150 MG/1
150 CAPSULE, EXTENDED RELEASE ORAL DAILY
Status: DISCONTINUED | OUTPATIENT
Start: 2023-02-16 | End: 2023-02-23 | Stop reason: HOSPADM

## 2023-02-15 RX ORDER — HYDRALAZINE HYDROCHLORIDE 20 MG/ML
INJECTION INTRAMUSCULAR; INTRAVENOUS AS NEEDED
Status: DISCONTINUED | OUTPATIENT
Start: 2023-02-15 | End: 2023-02-15 | Stop reason: SURG

## 2023-02-15 RX ORDER — HYDROMORPHONE HYDROCHLORIDE 1 MG/ML
0.5 INJECTION, SOLUTION INTRAMUSCULAR; INTRAVENOUS; SUBCUTANEOUS
Status: DISCONTINUED | OUTPATIENT
Start: 2023-02-15 | End: 2023-02-15

## 2023-02-15 RX ORDER — MEPERIDINE HYDROCHLORIDE 25 MG/ML
12.5 INJECTION INTRAMUSCULAR; INTRAVENOUS; SUBCUTANEOUS EVERY 10 MIN PRN
Status: DISCONTINUED | OUTPATIENT
Start: 2023-02-15 | End: 2023-02-15 | Stop reason: HOSPADM

## 2023-02-15 RX ORDER — IBUPROFEN 200 MG
16-32 TABLET ORAL AS NEEDED
Status: DISCONTINUED | OUTPATIENT
Start: 2023-02-15 | End: 2023-02-23 | Stop reason: HOSPADM

## 2023-02-15 RX ORDER — MIDAZOLAM HYDROCHLORIDE 2 MG/2ML
INJECTION, SOLUTION INTRAMUSCULAR; INTRAVENOUS AS NEEDED
Status: DISCONTINUED | OUTPATIENT
Start: 2023-02-15 | End: 2023-02-15 | Stop reason: SURG

## 2023-02-15 RX ORDER — ACETAMINOPHEN 325 MG/1
975 TABLET ORAL EVERY 8 HOURS
Status: DISCONTINUED | OUTPATIENT
Start: 2023-02-15 | End: 2023-02-23 | Stop reason: HOSPADM

## 2023-02-15 RX ORDER — EPHEDRINE SULFATE 50 MG/ML
INJECTION, SOLUTION INTRAVENOUS AS NEEDED
Status: DISCONTINUED | OUTPATIENT
Start: 2023-02-15 | End: 2023-02-15 | Stop reason: SURG

## 2023-02-15 RX ORDER — GABAPENTIN 300 MG/1
300 CAPSULE ORAL
Status: COMPLETED | OUTPATIENT
Start: 2023-02-15 | End: 2023-02-15

## 2023-02-15 RX ADMIN — HYDROMORPHONE HYDROCHLORIDE 0.25 MG: 1 INJECTION, SOLUTION INTRAMUSCULAR; INTRAVENOUS; SUBCUTANEOUS at 22:58

## 2023-02-15 RX ADMIN — ACETAMINOPHEN 975 MG: 325 TABLET ORAL at 06:53

## 2023-02-15 RX ADMIN — PROPOFOL 150 MG: 10 INJECTION, EMULSION INTRAVENOUS at 07:32

## 2023-02-15 RX ADMIN — DOCUSATE SODIUM 100 MG: 100 CAPSULE, LIQUID FILLED ORAL at 20:07

## 2023-02-15 RX ADMIN — SENNOSIDES 1 TABLET: 8.6 TABLET ORAL at 22:59

## 2023-02-15 RX ADMIN — TIOTROPIUM BROMIDE INHALATION SPRAY 1 PUFF: 3.12 SPRAY, METERED RESPIRATORY (INHALATION) at 20:06

## 2023-02-15 RX ADMIN — SODIUM CHLORIDE, POTASSIUM CHLORIDE, SODIUM LACTATE AND CALCIUM CHLORIDE: 600; 310; 30; 20 INJECTION, SOLUTION INTRAVENOUS at 23:32

## 2023-02-15 RX ADMIN — HYDROMORPHONE HYDROCHLORIDE 0.25 MG: 1 INJECTION, SOLUTION INTRAMUSCULAR; INTRAVENOUS; SUBCUTANEOUS at 17:09

## 2023-02-15 RX ADMIN — HYDROMORPHONE HYDROCHLORIDE 0.5 MG: 1 INJECTION, SOLUTION INTRAMUSCULAR; INTRAVENOUS; SUBCUTANEOUS at 11:35

## 2023-02-15 RX ADMIN — SODIUM CHLORIDE, POTASSIUM CHLORIDE, SODIUM LACTATE AND CALCIUM CHLORIDE: 600; 310; 30; 20 INJECTION, SOLUTION INTRAVENOUS at 14:52

## 2023-02-15 RX ADMIN — LABETALOL 20 MG/4 ML (5 MG/ML) INTRAVENOUS SYRINGE 5 MG: at 12:42

## 2023-02-15 RX ADMIN — ACETAMINOPHEN 975 MG: 325 TABLET ORAL at 22:59

## 2023-02-15 RX ADMIN — PROPOFOL 150 MCG/KG/MIN: 10 INJECTION, EMULSION INTRAVENOUS at 07:35

## 2023-02-15 RX ADMIN — CEFAZOLIN 2 G: 2 INJECTION, POWDER, FOR SOLUTION INTRAMUSCULAR; INTRAVENOUS at 09:00

## 2023-02-15 RX ADMIN — GABAPENTIN 300 MG: 300 CAPSULE ORAL at 06:54

## 2023-02-15 RX ADMIN — SUGAMMADEX 200 MG: 100 INJECTION, SOLUTION INTRAVENOUS at 12:45

## 2023-02-15 RX ADMIN — PHENYLEPHRINE HYDROCHLORIDE 200 MCG: 10 INJECTION INTRAVENOUS at 12:43

## 2023-02-15 RX ADMIN — KETOROLAC TROMETHAMINE 15 MG: 15 INJECTION, SOLUTION INTRAMUSCULAR; INTRAVENOUS at 15:48

## 2023-02-15 RX ADMIN — HYDRALAZINE HYDROCHLORIDE 10 MG: 20 INJECTION INTRAMUSCULAR; INTRAVENOUS at 12:38

## 2023-02-15 RX ADMIN — LIDOCAINE HYDROCHLORIDE 1 MG/MIN: 8 INJECTION, SOLUTION INTRAVENOUS at 07:35

## 2023-02-15 RX ADMIN — ACETAMINOPHEN 975 MG: 325 TABLET ORAL at 15:49

## 2023-02-15 RX ADMIN — PHENYLEPHRINE HYDROCHLORIDE 200 MCG: 10 INJECTION INTRAVENOUS at 12:41

## 2023-02-15 RX ADMIN — GABAPENTIN 300 MG: 300 CAPSULE ORAL at 15:49

## 2023-02-15 RX ADMIN — GLYCOPYRROLATE 0.2 MG: 0.2 INJECTION, SOLUTION INTRAMUSCULAR; INTRAVITREAL at 10:05

## 2023-02-15 RX ADMIN — DEXAMETHASONE SODIUM PHOSPHATE 4 MG: 4 INJECTION, SOLUTION INTRAMUSCULAR; INTRAVENOUS at 07:52

## 2023-02-15 RX ADMIN — TRAMADOL HYDROCHLORIDE 50 MG: 50 TABLET, COATED ORAL at 17:09

## 2023-02-15 RX ADMIN — FENTANYL CITRATE 75 MCG: 50 INJECTION, SOLUTION INTRAMUSCULAR; INTRAVENOUS at 07:33

## 2023-02-15 RX ADMIN — Medication 20 MG: at 09:21

## 2023-02-15 RX ADMIN — PHENYLEPHRINE HYDROCHLORIDE 25 MCG/MIN: 10 INJECTION INTRAVENOUS at 07:35

## 2023-02-15 RX ADMIN — Medication 30 MG: at 07:33

## 2023-02-15 RX ADMIN — PHENYLEPHRINE HYDROCHLORIDE 200 MCG: 10 INJECTION INTRAVENOUS at 12:13

## 2023-02-15 RX ADMIN — EPHEDRINE SULFATE 10 MG: 50 INJECTION, SOLUTION INTRAVENOUS at 09:25

## 2023-02-15 RX ADMIN — LIDOCAINE HYDROCHLORIDE 100 MG: 20 INJECTION, SOLUTION INTRAVENOUS at 07:32

## 2023-02-15 RX ADMIN — TRAMADOL HYDROCHLORIDE 50 MG: 50 TABLET, COATED ORAL at 06:53

## 2023-02-15 RX ADMIN — MIDAZOLAM HYDROCHLORIDE 2 MG: 1 INJECTION, SOLUTION INTRAMUSCULAR; INTRAVENOUS at 07:29

## 2023-02-15 RX ADMIN — ROCURONIUM BROMIDE 100 MG: 10 INJECTION, SOLUTION INTRAVENOUS at 07:33

## 2023-02-15 RX ADMIN — ROCURONIUM BROMIDE 50 MG: 10 INJECTION, SOLUTION INTRAVENOUS at 08:50

## 2023-02-15 RX ADMIN — SODIUM CHLORIDE: 9 INJECTION, SOLUTION INTRAVENOUS at 07:30

## 2023-02-15 RX ADMIN — GABAPENTIN 300 MG: 300 CAPSULE ORAL at 20:07

## 2023-02-15 RX ADMIN — ONDANSETRON HYDROCHLORIDE 4 MG: 2 SOLUTION INTRAMUSCULAR; INTRAVENOUS at 12:22

## 2023-02-15 RX ADMIN — FENTANYL CITRATE 25 MCG: 50 INJECTION, SOLUTION INTRAMUSCULAR; INTRAVENOUS at 07:30

## 2023-02-15 ASSESSMENT — LIFESTYLE VARIABLES: TOBACCO_USE: 1

## 2023-02-15 NOTE — ANESTHESIA PROCEDURE NOTES
Airway  Urgency: elective    Start Time: 2/15/2023 8:40 AM  Airway not difficult    General Information and Staff    Patient location during procedure: OR  Anesthesiologist: Deven Rasmussen MD  Resident/CRNA: Darrius Watkins CRNA  Performed: resident/CRNA and anesthesiologist     Indications and Patient Condition  Indications for airway management: anesthesia  Sedation level: general  Preoxygenated: yes  Patient position: sniffing  MILS maintained throughout  Mask difficulty assessment: 0 - not attempted    Final Airway Details  Final airway type: endotracheal airway      Successful airway: ETT and ETT - double lumen left  Cuffed: yes   Successful intubation technique: video laryngoscopy  Facilitating devices/methods: intubating stylet  Endotracheal tube insertion site: oral  Blade: Roman  Blade size: #3  ETT DL size (fr): 35  Cormack-Lehane Classification: grade I - full view of glottis  Placement verified by: chest auscultation, bronchoscopy and capnometry   Cuff volume (mL): 9  Measured from: lips  Number of attempts at approach: 1  Ventilation between attempts: none  Number of other approaches attempted: 0        Additional Comments  3ml of air in blue cuff

## 2023-02-15 NOTE — ANESTHESIA PROCEDURE NOTES
Airway  Urgency: elective    Start Time: 2/15/2023 7:35 AM  Airway not difficult    General Information and Staff    Patient location during procedure: OR  Anesthesiologist: Deven Rasmussen MD  Resident/CRNA: Darrius Watkins CRNA  Performed: resident/CRNA and anesthesiologist     Indications and Patient Condition  Indications for airway management: anesthesia  Sedation level: general  Preoxygenated: yes  Patient position: sniffing  MILS maintained throughout  Mask difficulty assessment: 0 - not attempted    Final Airway Details  Final airway type: endotracheal airway      Successful airway: ETT and ETT wEVAC  Cuffed: yes   Successful intubation technique: video laryngoscopy  Facilitating devices/methods: intubating stylet  Endotracheal tube insertion site: oral  Blade: Roman  Blade size: #3  ETT size (mm): 8.5 (PSR)  Cormack-Lehane Classification: grade I - full view of glottis  Placement verified by: chest auscultation and capnometry   Cuff volume (mL): 9  Measured from: teeth  ETT to teeth (cm): 21  Number of attempts at approach: 1  Ventilation between attempts: none  Number of other approaches attempted: 0  Atraumatic airway insertion

## 2023-02-15 NOTE — ANESTHESIOLOGIST PRE-PROCEDURE ATTESTATION
Pre-Procedure Patient Identification:  I am the Primary Anesthesiologist and have identified the patient on 02/15/23 at 8:18 AM.   I have confirmed the procedure(s) will be performed by the following surgeon/proceduralist Manoj Cameron MD.  (late entry, computer shut down in preop, note finished later)

## 2023-02-15 NOTE — OP NOTE
Date: 2/15/2023    Pre-Procedure Diagnosis: RLL Mass    Post-Procedure Diagnosis: Same- Prelim Path positive for carcinoma     Attending Physician: Dr. Amato, Dr. Cameron      Anesthesia: General    Summary of Procedure:  All risks and benefits of the procedure were explained in detail to the patient and consent was obtained and can be found on the chart. ION robot assisted bronchoscopy was performed in the OR with general anesthesia. A time out was performed.    The patient was intubated by the anesthesia team with a #8.5 ETT.     Standard fiberoptic bronchoscopy was performed to clear secretions and to adjust the ETT to at least 5 cm above the main sandor.    The ION robotic catheter was then introduced via the ETT and successfully registered. The catheter was then passed with guidance to the RLL lesion, which was visualized in the airway, and confirmed in place using rEBUS and 3D fluoroscopy.    Homer and needle aspiration sent to cytology, and brush was positive for carcinoma on prelim on-site evaluation  Biopsies were sent for permanent and frozen section.     EBUS then performed:  7- 7mm node with vessel coursing centrally  4R- no pathologic lymph nodes over 5mm  11R- no pathologic lymph nodes over 5mm    The ION was then removed and case was converted to a lobar resection.      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

## 2023-02-15 NOTE — ANESTHESIA PROCEDURE NOTES
Arterial Line:    Start time: 2/15/2023 8:31 AM  End time: 2/15/2023 8:37 AM    An arterial line was placed in the OR for the following indication(s): continuous blood pressure monitoring and blood sampling needed.    A 21 G (size) (length), Cook (type) catheter was placed,   Seldinger technique not used, into the Right radial artery, secured by Tegaderm.      Procedure performed using ultrasound guidance and surface landmarks.  Image not captured or stored.  Image visualization comments: Ultrasound used to visualize the placement of wire and/or needle in appropriate artery.    Events:  patient tolerated procedure well with no complications.    The supervising anesthesiologist was   Performed By: anesthesiologist and resident/CRNA  Attending: Deven Rasmussen MD  CRNA:Darrius Watkins CRNA

## 2023-02-15 NOTE — POST-OP (NON-BILLABLE)
Thoracic Surgery Post-Op Check    Interval History:  Narcisa Ramirez is a 62 y.o. female S/p Procedure(s):  Ion Navigational Bronchoscopy, EBUS, Robot assisted thoracoscopic right lower lobectomy with en-bloc middle lobe wedge resection, mediastinal lymph node dissection, lysis of adhesions on 02/15/23 with Manoj Cameron MD [[id number]] . Patient tolerated the procedure well, was extubated, and transferred to the PACU in stable condition.    Patient was seen and examined at bedside on 1 pav. She appeared uncomfortable but was still pleasant and conversational. When asked if her pain was controlled, she reported an 8 out of 10 in severity. After discussing with her nurse she only received her tylenol and toradol, so she will get her PRN pain medications. She has been tolerating sips of water and reports that she will try the clear liquids for dinner. Reports that she does have back pain, likely where the chest tubes are placed. Patient has a 1 chamber air leak upon cough. Denies nausea, vomiting, chest pain, or shortness of breath      Vitals:    02/15/23 1530   BP: (!) 108/52   Pulse: 70   Resp: 14   Temp:    SpO2: 100%       Physical Exam:  General: awake and alert, looks uncomfortable  HEENT: normocephalic, atraumatic, EOM intact, conjunctiva clear, sclera nonicteric  CV: normal rate, normotensive  Pulm: normal work of breathing, no acute distress. On 3 L NC. Two right-sided chest tubes Y-connected to suction. Serosanguinous output. 1 chamber air leak upon coughing   Abd: soft, nontender, non-distended  Skin: incisions C/D/I, no surrounding erythema  Extr: no obvious abnormality, moving extremities spontaneously  Neuro: Grossly normal    CBC Results       02/15/23 02/03/23 03/06/15     1321 1154 0144    WBC 11.97 8.79 8.64    RBC 3.81 4.38 4.45    HGB 12.0 13.7 14.4    HCT 36.9 41.3 41.1    MCV 96.9 94.3 92.4    MCH 31.5 31.3 32.4    MCHC 32.5 33.2 35.0     361 279          BMP Results       02/15/23 02/03/23  03/06/15     1321 1154 0144     139 136    K 4.5 4.3 3.5    Cl 114 104 103    CO2 18 23 28    Glucose 147 90 100    BUN 9 9 15    Creatinine 1.0 1.0 0.8    Calcium 7.6 9.5 10.0    Anion Gap 8 12 5    EGFR 55.8 55.8 --         Comment for K at 0144 on 03/06/15: RESULTS OBTAINED ON PLASMA. PLASMA POTASSIUM VALUES MAY BE UP TO 0.4 MEQ/L LESS THAN SERUM VALUES. THE DIFFERENCES MAY BE GREATER FOR PATIENTS WITH HIGH PLATELET OR WHITE CELL COUNTS.          Assessment/Plan:  Narcisa Ramirez is a 62 year old female s/p ion bronchoscopy and right robotic lower lobectomy with en-bloc middle lobe wedge resection, mediastinal lymph node dissection with Dr. Cameron on 2/15/23     -Clear liquid diet   -Chest tube to waterseal at midnight   -Multimodal pain control (tylenol, gabapentin, PRN dilaudid, PRN tramadol)    -Dc Fc 2/16 at 6:00 AM  -LR at 80, hep lock when tolerating clears   -SQH  -FU PT/OT  -FU AM labs   -Colace & senna  -IS/OOB    ALDO Cavazos C  General Surgery  Please page [phone] with questions or concerns

## 2023-02-15 NOTE — BRIEF OP NOTE
Ion Navigational Bronchoscopy, EBUS, Robot assisted thoracoscopic right lower lobectomy with en-bloc middle lobe wedge resection, mediastinal lymph node dissection, lysis of adhesions (R) Procedure Note    Procedure:    Ion Navigational Bronchoscopy, EBUS, Robot assisted thoracoscopic right lower lobectomy with en-bloc middle lobe wedge resection, mediastinal lymph node dissection, lysis of adhesions  CPT(R) Code:  53444 - SC BRONCHOSCOPY W/CPTR-ASST IMAGE-GUIDED NAVIGATION      Pre-op Diagnosis     * Pulmonary nodule [R91.1]       Post-op Diagnosis     * Pulmonary nodule [R91.1]    Surgeon(s) and Role:     * Manoj Cameron MD - Primary     * Deven Rasmussen MD - Assisting     * Jolie Amato DO    Anesthesia: Monitor Anesthesia Care    Staff:   Circulator: Phylicia Mccracken, BOBY; Lauren Mckeon, BOBY; Jocelynn Arana, BOBY  Scrub Person: Joon Burns RN    Procedure Details   Ion bronchoscopy, RLL lobectomy, R middle lobe wedge resection, and mediastinal lymph node dissection     Estimated Blood Loss: No blood loss documented.    Specimens:                Order Name Source Comment Collection Info Order Time   TYPE AND SCREEN Blood, Venous   2/15/2023  6:38 AM     Are you aware of this patient having previously identified antibodies?   NO          Does this Patient have Sickle Cell Disease/Sickle Cell Anemia?   NO          Release to patient   Immediate        CBC Blood, Venous   2/15/2023 12:55 PM     Release to patient   Immediate        BASIC METABOLIC PANEL Blood, Venous   2/15/2023 12:55 PM     Release to patient   Immediate        CYTOLOGY, NON-GYN (MLHL) Bronchial Brushing, Right Lower Lobe Pre-op diagnosis:  Pulmonary nodule Collected By: Manoj Cameron MD 2/15/2023  7:57 AM     Release to patient   Immediate        PATHOLOGY TISSUE EXAM Lung, Right Lower Lobe Pre-op diagnosis:  Pulmonary nodule Collected By: Manoj Cameron MD 2/15/2023  8:10 AM     Release to patient   Immediate               Drains:   Chest Tube Right Sixth intercostal space 28 Fr (Active)       Chest Tube Right Other (Comment) 32 Fr (Active)       Urethral Catheter Latex 16 Fr (Active)       Implants: * No implants in log *           Complications:  None; patient tolerated the procedure well.           Disposition: PACU - hemodynamically stable.           Condition: stable    Manoj Cameron MD  Phone Number: [phone]

## 2023-02-15 NOTE — ANESTHESIA POSTPROCEDURE EVALUATION
Patient: Narcisa Ramirez    Procedure Summary     Date: 02/15/23 Room / [facility]: [facility] / [facility]    Anesthesia Start: 0730 Anesthesia Stop: 1318    Procedure: Ion Navigational Bronchoscopy, EBUS, Robot assisted thoracoscopic right lower lobectomy with en-bloc middle lobe wedge resection, mediastinal lymph node dissection, lysis of adhesions (Right) Diagnosis:       Pulmonary nodule      (Pulmonary nodule)    Surgeons: Manoj Cameron MD Responsible Provider: Deven Rasmussen MD    Anesthesia Type: general ASA Status: 3          Anesthesia Type: general  PACU Vitals  2/15/2023 1259 - 2/15/2023 1359      2/15/2023  1315 2/15/2023  1330          BP: 87/41 87/47      Arterial Line BP: 106/36 94/30      Temp: 36.3 °C (97.4 °F) --      Pulse: 62 62      Resp: 16 19      SpO2: 100 % 100 %              Anesthesia Post Evaluation    Pain management: adequate  Mode of pain management: IV medication  Patient location during evaluation: PACU  Patient participation: complete - patient participated  Level of consciousness: awake  Cardiovascular status: acceptable  Airway Patency: adequate  Respiratory status: acceptable, spontaneous ventilation and face mask  Hydration status: acceptable  Anesthetic complications: no

## 2023-02-15 NOTE — H&P
Signed        Expand All Collapse All[]Expand All by Default  THORACIC SURGERY CONSULT NOTE        Patient ID: Narcisa Ramirez  : 1960  MRN: [id number]                                             Encounter Provider: Manoj Cameron  Referring Provider: Floresita Patton MD     Reason for consult: Right lower lobe lung nodule     HPI:  Narcisa Ramirez is a 62 y.o. female current active 25-pack-year smoker who began experiencing small-volume hemoptysis about 3 months ago.  She eventually saw her primary care physician where chest x-ray showed an abnormality in the right lower lobe followed by CT chest showing a 2.5 cm right lower lobe lung nodule.  Follow-up PET/CT showed been nodule to be significantly FDG avid with a mildly avid right hilar lymph node.  No sites of distant metastatic disease.     She had hemoptysis for about 3 months which stopped 2 weeks ago.  She denies fever chills sweats or weight loss.  Denies any chest pain.  Denies cough or wheeze.  She gets short of breath with 3-4 flights of stairs but not on flat surfaces.     She saw cardiologist previously for mitral valve prolapse.     She had a previous VATS right lower lobe wedge resection in 2014 for a different nodule that was benign.        Medical History:   Medical History        Past Medical History:   Diagnosis Date   • COPD (chronic obstructive pulmonary disease) (CMS/HCC)     • Depression     • Lung nodule              Surgical History:   Surgical History         Past Surgical History:   Procedure Laterality Date   • LUNG SURGERY                Social History:   Social History   Social History            Socioeconomic History   • Marital status:        Spouse name: None   • Number of children: None   • Years of education: None   • Highest education level: None   Tobacco Use   • Smoking status: Former       Packs/day: 0.75       Years: 36.00       Pack years: 27.00       Types: Cigarettes       Start date:        Quit  "date: 2022       Years since quittin.8   • Smokeless tobacco: Never   • Tobacco comments:       never vaped   Substance and Sexual Activity   • Alcohol use: No   • Drug use: No            Family History:         Family History   Problem Relation Age of Onset   • Lung cancer Neg Hx           Home Medications:     Current Outpatient Medications:   •  umeclidinium 62.5 mcg/actuation blister with device, Inhale 62.5 mcg daily., Disp: , Rfl:   •  venlafaxine XR (EFFEXOR-XR) 150 mg 24 hr capsule, Take 150 mg by mouth daily., Disp: , Rfl:      Allergies: No Known Allergies     ROS:   Constitutional: Negative   HEENT: Negative  Cardiac: Negative  Pulmonary: Negative  GI: Negative  : Negative  Musculoskeletal: Negative  Derm: Negative  Neuro: Negative  Psych: Negative  Heme: Negative        Visit Vitals  /82 (BP Location: Right upper arm, Patient Position: Sitting)   Pulse 77   Temp 36.4 °C (97.6 °F)   Resp 20   Ht 1.6 m (5' 3\")   Wt 59.9 kg (132 lb)   SpO2 96%   BMI 23.38 kg/m²         Physical Exam  Constitutional: No acute distress. Alert and pleasant and appears stated age.   Eyes: Anicteric, equal and reactive.  Neck: No jugular venous distension. Trachea is midline. Thyroid is without nodularity or involvement. Normal movement with swallowing. No cervical adenopathy.   Respiratory: Clear to auscultation bilaterally to the bases with no wheezes, rhonchi, or crackles.   Cardiovascular: Regular rate and rhythm with No murmurs, gallops, or rubs.   Abdomen: Soft, non tender and non distended with normal bowel sounds and no hepatosplenomegaly. No hernias.   Pulses: No carotid bruits, palpable distal radial pulses and distal lower extremity pulses.   Musculoskeletal: No clubbing, cyanosis, or edema. No petechiae. Full strength of all four extremities.   Neurologic: Cranial nerves are intact.   Lymph: No palpable cervical, supraclavicular, axillary, or inguinal lymphadenopathy        ECOG/Performace Status:  1 " - Symptomatic but completely ambulatory        PFTs  FEV1 1.5 L     Radiology  All pertinent radiologic images and reports were personally reviewed by me.     CLINICAL HISTORY: R91.1: Solitary pulmonary nodule. Right lower lobe pulmonary  nodule.  No known malignancy. Initial treatment strategy.     COMMENT:     Comparison: Outside CT chest 11/17/2022, ultrasound abdomen and pelvis  9/23/2019, CT abdomen pelvis 5/18/2016, PET/CT 5/30/2014     Technique:  Administered radiotracer and dose: 9.31millicurie of fludeoxyglucose F-18 (FDG)  injection 9.31 millicurie  A PET/CT scan from the base of the skull to mid thigh was obtained on the  [employer] PET/CT approximately 58 minutes after injection.  The patient's fasting blood glucose level was 81 mg/dL. A low dose CT was  obtained of the same area without IV contrast for attenuation correction and  coregistration, not for a diagnostic scan.     Findings:  VISUALIZED BRAIN: Normal metabolic activity.     HEAD AND NECK: Normal physiologic uptake in the nasopharynx, oropharynx,  hypopharynx, and larynx. The parotid, submandibular, and lingual glands  demonstrate normal metabolic activity. The thyroid gland shows no hypermetabolic  nodules. There is no hypermetabolic cervical or supraclavicular lymphadenopathy.  Atherosclerotic calcifications at the carotid artery bifurcations.     CHEST: Background blood pool activity shows maximum SUV of 2.6. Normal  physiologic metabolic activity in the myocardium.  Atherosclerotic  calcifications of the thoracic aorta.  There is a 2.6 x 2.5 cm nodule in the  right lower lobe (image 42) with maximum SUV of 16.2, which is new compared to  5/30/2014.  The previously seen mildly hypermetabolic nodule in the posterior  right lower lobe is no longer identified.  Mild centrilobular emphysema.  There  is linear atelectasis versus scarring and mild dependent hypoventilatory changes  at the lung bases.  There is mild  hypermetabolic activity in the right hilum  with maximum SUV 3.0.  There is no hypermetabolic mediastinal, or axillary  lymphadenopathy. No abnormal activity in the esophagus. There is no abnormal  hypermetabolic activity in the breasts.     ABDOMEN AND PELVIS: Background liver activity shows maximum SUV of 3.1.  There  is normal, uniform metabolic activity in the liver and spleen. The gallbladder  is within normal limits. There is no hypermetabolic activity in the adrenal  glands or pancreas.  Small left lower pole renal cyst.  There is no  hypermetabolic mesenteric, retroperitoneal, iliac, or inguinal lymphadenopathy.  No abnormal uptake in the stomach. Nonspecific physiologic activity in the colon  and intestine is noted.  Colonic diverticulosis.  Atherosclerotic calcifications  of the aortoiliac system.     VISUALIZED MUSCULOSKELETAL SYSTEM: No hypermetabolic activity to suggest  osteolytic metastases or sclerosis to suggest osteoblastic metastases.  There  are degenerative changes in the spine and hips.     --  IMPRESSION:  Hypermetabolic right lower lobe pulmonary nodule measuring 2.6 cm with maximum  SUV of 16.2, suspicious for neoplasm.  Mild hypermetabolic activity in the right  hilum, for which metastatic disease is not excluded.           Assessment/Plan      62-year-old female current active 25-pack-year smoker began having hemoptysis 3 months ago and during work-up for this was found to have an FDG avid 2.5 cm right lower lobe lung nodule with mild FDG avidity in the right hilum.  I discussed with the patient the neck step would be obtaining a tissue diagnosis and we discussed the 3 options to do so, namely percutaneous needle biopsy with radiology, Ion navigational bronchoscopy with needle biopsy, or surgical wedge resection.  I have recommended that we proceed with Ion navigational bronchoscopy to biopsy the right lower lobe lung nodule as well as concurrent EBUS to stage the mediastinum.  If the  nodule proves malignancy in the mediastinum is negative, we would proceed with robotic right lower lobectomy and mediastinal lymph node dissection during the same anesthetic event.  Risks and benefits were discussed, namely the risks of bleeding, infection, pneumonia, atrial fibrillation, conversion to open thoracotomy, heart attack, stroke, death.  All of her questions were answered and she wishes to proceed with surgery as planned.  We will arrange for cardiac clearance and get her on the schedule.  I have suggested that she try as hard as possible to quit smoking for at least 2 weeks before surgery and I have given her a worksheet for online smoking cessation programs here at [facility].                    Manoj Cameron MD

## 2023-02-15 NOTE — OP NOTE
THORACIC SURGERY OPERATIVE REPORT      Patient Name:  Narcisa Ramirez  Medical Record Number:  [id number]  YOB: 1960   Date of Operation:  2/15/2023  Primary Surgeon:  Manoj Cameron MD  First Assistant: MD Sayda Segundo PA    Preoperative Diagnosis:    Right lower lobe lung mass    Postoperative Diagnosis:   Right lower lobe non-small cell lung cancer    Operation:   Ion Navigational Bronchoscopy with radial probe and biopsy, EBUS (to be dictated separately by Dr. Audrey Amato)    Robot assisted thoracoscopic right lower lobectomy with en-bloc middle lobe wedge resection, mediastinal lymph node dissection, lysis of adhesions      Indication: Narcisa Ramirez is a 62 y.o. female current active 25-pack-year smoker who began experiencing small-volume hemoptysis about 3 months ago.  She eventually saw her primary care physician where chest x-ray showed an abnormality in the right lower lobe followed by CT chest showing a 2.5 cm right lower lobe lung nodule.  Follow-up PET/CT showed been nodule to be significantly FDG avid with a mildly avid right hilar lymph node.  No sites of distant metastatic disease.    Operative Findings:  Ion navigational bronchoscopy shows endobronchial tumor in the basilar segments of the right lower lobe, onsite cytology shows non-small cell lung cancer    EBUS shows nonenlarged mediastinal lymph nodes    Right thoracoscopy shows adhesions between the middle and lower lobes and the diaphragm as well as the chest wall; there is no evidence of pleural metastatic disease    Frozen section on the right middle lobe staple line negative for malignancy    Procedure Details   Patient was brought to the operating room laid operating table supine position.  After smooth induction of general anesthesia, she was intubated with a single-lumen tube.  Timeout was performed.  Ion navigational bronchoscopy with radial probe and biopsy as well as EBUS was performed and this will  be dictated separate by Dr. Joan Carroll.  Findings were significant for non-small cell lung cancer in the right middle lobe lung mass and unremarkable mediastinal nodes on EBUS.  The single-lumen was then exchanged for a double-lumen tube for single lung ventilation.  Moreland catheter, peripheral IV access, and radial arterial line replaced.  Patient was positioned in the left lateral decubitus position with the right side up.  Care was taken to pad and protect all bony prominences and prevent nerve injuries.  The right chest was prepped and draped in usual sterile fashion.  In the eighth intercostal space, an 8 mm robotic port was inserted into the right pleural cavity and CO2 insufflation was initiated.  3 more robotic ports as well as a 12 mm assistant port were placed.  Robot was docked.  There were dense adhesions between the lower lobe and the middle lobe as well as the lower lobe and the diaphragm as well as the chest wall anteriorly and posteriorly.  These adhesions were lysed for approximately 30 minutes.  Next, the inferior pulmonary ligament was divided.  Pleura over the posterior hilum was opened and station 7 subcarinal nodes were harvested for permanent section.  Pleura over the bronchus intermedius was opened and the bifurcation between bronchus intermedius and upper lobe bronchus was dissected.  Next, attention was turned to the major fissure where the pleura over the interlobar pulmonary artery was opened.  The posterior fissure was divided with a 45 mm green robotic stapler.  Several station 11 R interlobar lymph nodes were harvested for permanent section.  The interlobar pulmonary artery to the lower lobe distal to the middle lobe branch was encircled with a vessel loop and divided with a 30 mm white robotic stapler.  Next, the lobe was retracted cephalad and the lower lobe vein was encircled with a vessel loop and divided with a 30 mm white robotic stapler.  Attention was turned to the fissure  again where the lower lobe bronchus was identified encircled with an umbilical tape and divided with a 45 mm green robotic stapler.  Finally, the anterior fissure was divided with a 45 mm green robotic stapler load intentionally cheating on to the middle lobe to perform an en bloc middle lobe wedge resection with the lower lobe for a negative margin.  The middle lobe staple line was marked with a stitch for frozen section which showed no evidence of malignancy.  Station 4R right paratracheal nodes were harvested for permanent section.  intercostal nerve block was performed with Exparel.  28 Ivorian straight and curved chest tubes were placed and secured to the skin with #2 Ethibond sutures.  The remaining port sites were closed in deep and superficial layers.  All lap, needle, instrument counts are correct x2.  Patient tolerated procedure well.    ALDO Naylor assisted during port insertion, instrument exchange, specimen retrieval, and port closure.  There was no available qualified surgical resident for robotic bedside assisting.    Estimated Blood Loss:   10 cc    Specimens:                Order Name Source Comment Collection Info Order Time   TYPE AND SCREEN Blood, Venous   2/15/2023  6:38 AM     Are you aware of this patient having previously identified antibodies?   NO          Does this Patient have Sickle Cell Disease/Sickle Cell Anemia?   NO          Release to patient   Immediate        CYTOLOGY, NON-GYN (MLHL) Bronchial Brushing, Right Lower Lobe Pre-op diagnosis:  Pulmonary nodule Collected By: Manoj Cameron MD 2/15/2023  7:57 AM     Release to patient   Immediate        PATHOLOGY TISSUE EXAM Lung, Right Lower Lobe Pre-op diagnosis:  Pulmonary nodule Collected By: Manoj Cameron MD 2/15/2023  8:10 AM     Release to patient   Immediate            Drains:   Chest Tube Right Sixth intercostal space 28 Fr (Active)       Chest Tube Right Other (Comment) 32 Fr (Active)       Urethral Catheter Latex 16 Fr  (Active)       Implants:   * No implants in log *    Anesthesia: Monitor Anesthesia Care    Anesthesiologist: Anesthesiologist: Deven Rasmussen MD  CRNA: Darrius Watkins CRNA            Complications: None: the patient tolerated the procedure well           Disposition: PACU - hemodynamically stable.           Condition: stable      Manoj Cameron MD

## 2023-02-15 NOTE — ADDENDUM NOTE
Addendum  created 02/15/23 144 by Darrius Watkins CRNA    Child order released for a procedure order, Clinical Note Signed, Flowsheet accepted, Intraprocedure Blocks edited, LDA created via procedure documentation, LDA properties accepted, SmartForm saved

## 2023-02-15 NOTE — OR SURGEON
Pre-Procedure patient identification:  I am the primary operating surgeon/proceduralist and I have identified the patient and confirmed laterality is right on 02/15/23 at 7:09 AM Manoj Cameron MD  Phone Number: [phone]

## 2023-02-16 ENCOUNTER — APPOINTMENT (INPATIENT)
Dept: RADIOLOGY | Facility: HOSPITAL | Age: 63
DRG: 164 | End: 2023-02-16
Payer: COMMERCIAL

## 2023-02-16 LAB
ANION GAP SERPL CALC-SCNC: 10 MEQ/L (ref 3–15)
BUN SERPL-MCNC: 8 MG/DL (ref 8–20)
CALCIUM SERPL-MCNC: 8.4 MG/DL (ref 8.9–10.3)
CHLORIDE SERPL-SCNC: 106 MEQ/L (ref 98–109)
CO2 SERPL-SCNC: 22 MEQ/L (ref 22–32)
CREAT SERPL-MCNC: 1 MG/DL (ref 0.6–1.1)
ERYTHROCYTE [DISTWIDTH] IN BLOOD BY AUTOMATED COUNT: 12.5 % (ref 11.7–14.4)
GFR SERPL CREATININE-BSD FRML MDRD: 55.8 ML/MIN/1.73M*2
GLUCOSE SERPL-MCNC: 147 MG/DL (ref 70–99)
HCT VFR BLDCO AUTO: 40.3 % (ref 35–45)
HGB BLD-MCNC: 12.8 G/DL (ref 11.8–15.7)
LABORATORY COMMENT REPORT: NORMAL
MCH RBC QN AUTO: 30.8 PG (ref 28–33.2)
MCHC RBC AUTO-ENTMCNC: 31.8 G/DL (ref 32.2–35.5)
MCV RBC AUTO: 96.9 FL (ref 83–98)
PDW BLD AUTO: 9.3 FL (ref 9.4–12.3)
PLATELET # BLD AUTO: 268 K/UL (ref 150–369)
POTASSIUM SERPL-SCNC: 4 MEQ/L (ref 3.6–5.1)
RBC # BLD AUTO: 4.16 M/UL (ref 3.93–5.22)
SODIUM SERPL-SCNC: 138 MEQ/L (ref 136–144)
WBC # BLD AUTO: 12.38 K/UL (ref 3.8–10.5)

## 2023-02-16 PROCEDURE — 63600000 HC DRUGS/DETAIL CODE: Performed by: STUDENT IN AN ORGANIZED HEALTH CARE EDUCATION/TRAINING PROGRAM

## 2023-02-16 PROCEDURE — 97162 PT EVAL MOD COMPLEX 30 MIN: CPT | Mod: GP

## 2023-02-16 PROCEDURE — 21400000 HC ROOM AND CARE CCU/INTERMEDIATE

## 2023-02-16 PROCEDURE — 71045 X-RAY EXAM CHEST 1 VIEW: CPT

## 2023-02-16 PROCEDURE — 63700000 HC SELF-ADMINISTRABLE DRUG: Performed by: STUDENT IN AN ORGANIZED HEALTH CARE EDUCATION/TRAINING PROGRAM

## 2023-02-16 PROCEDURE — 80048 BASIC METABOLIC PNL TOTAL CA: CPT | Performed by: SURGERY

## 2023-02-16 PROCEDURE — 99024 POSTOP FOLLOW-UP VISIT: CPT | Performed by: SURGERY

## 2023-02-16 PROCEDURE — 63600000 HC DRUGS/DETAIL CODE

## 2023-02-16 PROCEDURE — 85027 COMPLETE CBC AUTOMATED: CPT | Performed by: STUDENT IN AN ORGANIZED HEALTH CARE EDUCATION/TRAINING PROGRAM

## 2023-02-16 PROCEDURE — 36415 COLL VENOUS BLD VENIPUNCTURE: CPT | Performed by: SURGERY

## 2023-02-16 RX ORDER — TRAMADOL HYDROCHLORIDE 50 MG/1
50 TABLET ORAL EVERY 6 HOURS PRN
Qty: 15 TABLET | Refills: 0 | Status: SHIPPED | OUTPATIENT
Start: 2023-02-16 | End: 2023-02-21

## 2023-02-16 RX ORDER — GABAPENTIN 300 MG/1
300 CAPSULE ORAL 3 TIMES DAILY
Qty: 90 CAPSULE | Refills: 0 | Status: SHIPPED | OUTPATIENT
Start: 2023-02-16 | End: 2023-02-21 | Stop reason: SDUPTHER

## 2023-02-16 RX ORDER — HYDRALAZINE HYDROCHLORIDE 20 MG/ML
5 INJECTION INTRAMUSCULAR; INTRAVENOUS ONCE
Status: DISCONTINUED | OUTPATIENT
Start: 2023-02-16 | End: 2023-02-16

## 2023-02-16 RX ORDER — HYDRALAZINE HYDROCHLORIDE 20 MG/ML
5 INJECTION INTRAMUSCULAR; INTRAVENOUS ONCE
Status: COMPLETED | OUTPATIENT
Start: 2023-02-16 | End: 2023-02-16

## 2023-02-16 RX ADMIN — GABAPENTIN 300 MG: 300 CAPSULE ORAL at 14:21

## 2023-02-16 RX ADMIN — DOCUSATE SODIUM 100 MG: 100 CAPSULE, LIQUID FILLED ORAL at 14:21

## 2023-02-16 RX ADMIN — ACETAMINOPHEN 975 MG: 325 TABLET ORAL at 14:21

## 2023-02-16 RX ADMIN — GABAPENTIN 300 MG: 300 CAPSULE ORAL at 19:53

## 2023-02-16 RX ADMIN — HEPARIN SODIUM 5000 UNITS: 5000 INJECTION, SOLUTION INTRAVENOUS; SUBCUTANEOUS at 21:18

## 2023-02-16 RX ADMIN — SENNOSIDES 1 TABLET: 8.6 TABLET ORAL at 21:18

## 2023-02-16 RX ADMIN — KETOROLAC TROMETHAMINE 15 MG: 15 INJECTION, SOLUTION INTRAMUSCULAR; INTRAVENOUS at 18:29

## 2023-02-16 RX ADMIN — GABAPENTIN 300 MG: 300 CAPSULE ORAL at 08:49

## 2023-02-16 RX ADMIN — KETOROLAC TROMETHAMINE 15 MG: 15 INJECTION, SOLUTION INTRAMUSCULAR; INTRAVENOUS at 23:28

## 2023-02-16 RX ADMIN — HYDRALAZINE HYDROCHLORIDE 5 MG: 20 INJECTION INTRAMUSCULAR; INTRAVENOUS at 12:03

## 2023-02-16 RX ADMIN — HEPARIN SODIUM 5000 UNITS: 5000 INJECTION, SOLUTION INTRAVENOUS; SUBCUTANEOUS at 14:21

## 2023-02-16 RX ADMIN — TIOTROPIUM BROMIDE INHALATION SPRAY 1 PUFF: 3.12 SPRAY, METERED RESPIRATORY (INHALATION) at 08:52

## 2023-02-16 RX ADMIN — KETOROLAC TROMETHAMINE 15 MG: 15 INJECTION, SOLUTION INTRAMUSCULAR; INTRAVENOUS at 11:18

## 2023-02-16 RX ADMIN — VENLAFAXINE HYDROCHLORIDE 150 MG: 150 CAPSULE, EXTENDED RELEASE ORAL at 08:49

## 2023-02-16 RX ADMIN — DOCUSATE SODIUM 100 MG: 100 CAPSULE, LIQUID FILLED ORAL at 08:50

## 2023-02-16 RX ADMIN — ACETAMINOPHEN 975 MG: 325 TABLET ORAL at 21:18

## 2023-02-16 RX ADMIN — CETIRIZINE HYDROCHLORIDE 10 MG: 10 TABLET, FILM COATED ORAL at 08:50

## 2023-02-16 RX ADMIN — HEPARIN SODIUM 5000 UNITS: 5000 INJECTION, SOLUTION INTRAVENOUS; SUBCUTANEOUS at 06:00

## 2023-02-16 RX ADMIN — ACETAMINOPHEN 975 MG: 325 TABLET ORAL at 05:59

## 2023-02-16 RX ADMIN — DOCUSATE SODIUM 100 MG: 100 CAPSULE, LIQUID FILLED ORAL at 19:53

## 2023-02-16 RX ADMIN — HYDROMORPHONE HYDROCHLORIDE 0.25 MG: 1 INJECTION, SOLUTION INTRAMUSCULAR; INTRAVENOUS; SUBCUTANEOUS at 04:33

## 2023-02-16 ASSESSMENT — COGNITIVE AND FUNCTIONAL STATUS - GENERAL
MOVING TO AND FROM BED TO CHAIR: 3 - A LITTLE
CLIMB 3 TO 5 STEPS WITH RAILING: 3 - A LITTLE
STANDING UP FROM CHAIR USING ARMS: 3 - A LITTLE
AFFECT: WNL
WALKING IN HOSPITAL ROOM: 3 - A LITTLE

## 2023-02-16 NOTE — PROGRESS NOTES
[facility] (Surgical Oncology and Thoracic Surgery) Daily Progress Note  Pager: [phone]    Subjective   NAEO. S/p OR yesterday. Was seen resting comfortably in bed. Endorses pain is well controlled. Denies chest pain, fever, chills, nausea, vomiting, tolerating liquids. CT to WS @ midnight with serosang output, 1 chamber air leak.     Objective     Vital signs in last 24 hours:  Temp:  [36.3 °C (97.4 °F)-36.7 °C (98.1 °F)] 36.6 °C (97.8 °F)  Heart Rate:  [62-80] 64  Resp:  [12-22] 16  BP: ()/(41-73) 156/70      Intake/Output Summary (Last 24 hours) at 2/16/2023 0712  Last data filed at 2/16/2023 0600  Gross per 24 hour   Intake 4806.67 ml   Output 2078 ml   Net 2728.67 ml     Intake/Output this shift:  No intake/output data recorded.    Physical Exam  General: awake and alert, looks uncomfortable  HEENT: normocephalic, atraumatic, EOM intact, conjunctiva clear, sclera nonicteric  CV: normal rate, normotensive  Pulm: normal work of breathing, no acute distress. On 3 L NC. Two right-sided chest tubes Y-connected to WS. Serosanguinous output. 1 chamber air leak upon coughing   Abd: soft, nontender, non-distended  Skin: incisions C/D/I, no surrounding erythema  Extr: no obvious abnormality, moving extremities spontaneously  Neuro: Grossly normal      Labs:  BMP Results       02/15/23 02/03/23 03/06/15     1321 1154 0144     139 136    K 4.5 4.3 3.5    Cl 114 104 103    CO2 18 23 28    Glucose 147 90 100    BUN 9 9 15    Creatinine 1.0 1.0 0.8    Calcium 7.6 9.5 10.0    Anion Gap 8 12 5    EGFR 55.8 55.8 --         Comment for K at 0144 on 03/06/15: RESULTS OBTAINED ON PLASMA. PLASMA POTASSIUM VALUES MAY BE UP TO 0.4 MEQ/L LESS THAN SERUM VALUES. THE DIFFERENCES MAY BE GREATER FOR PATIENTS WITH HIGH PLATELET OR WHITE CELL COUNTS.         CBC Results       02/15/23 02/03/23 03/06/15     1321 1154 0144    WBC 11.97 8.79 8.64    RBC 3.81 4.38 4.45    HGB 12.0 13.7 14.4    HCT 36.9 41.3 41.1    MCV 96.9 94.3 92.4     MCH 31.5 31.3 32.4    [facility] 32.5 33.2 35.0     361 279          Imaging  X-RAY CHEST 1 VIEW    Result Date: 2/15/2023  IMPRESSION: Postoperative right lung volume loss and mediastinal deviation.       Assessment/Plan     62 year old female s/p ion bronchoscopy and right robotic lower lobectomy with en-bloc middle lobe wedge resection, mediastinal lymph node dissection with Dr. Cameron on 2/15/23      -adv to regular diet  -Chest tube to waterseal  -Multimodal pain control (tylenol, gabapentin, PRN dilaudid, PRN tramadol)    -Dc Fc   -SQH  -FU PT/OT  -FU AM labs   -Colace & senna  -IS/OOB      Raymond Bills MD  General Surgery Resident  Please page *Smink [phone] with any questions or concerns.

## 2023-02-16 NOTE — PLAN OF CARE
Problem: Adult Inpatient Plan of Care  Goal: Plan of Care Review  Outcome: Progressing  Flowsheets (Taken 2/16/2023 9937)  Plan of Care Reviewed With: patient  Outcome Summary: PT eval, progressing well, expect pt to return home with no ongoing PT needs     Problem: Mobility Impairment  Goal: Optimal Mobility  Outcome: Progressing

## 2023-02-16 NOTE — PLAN OF CARE
Problem: Adult Inpatient Plan of Care  Goal: Plan of Care Review  Outcome: Progressing  Flowsheets (Taken 2/16/2023 0650)  Outcome Summary: Pt is AAOx4. VSS. Pain well managed on hydromorphone IV. CT to -[id number] suction, changed to water seal at 12AM as ordered, with 300mL serosang output last 12 hours. (+) air leak (-) SQ air. Dsg C/D/I. Lungs coarse on L and clear on R.  R chest incision well-approximated with dermabond. No drainage. Pt weaned off O2 to RA. Pulse ox 93-94%. Moreland with clear yellow urine--d/c'd at 6AM as ordered. Pt due to void at 14:00. Pt slept through night without event. Bed alarmed and call bell within reach.  Goal: Optimal Comfort and Wellbeing  Outcome: Progressing     Problem: Skin Injury Risk Increased  Goal: Skin Health and Integrity  Outcome: Progressing     Problem: Fall Injury Risk  Goal: Absence of Fall and Fall-Related Injury  Outcome: Progressing

## 2023-02-16 NOTE — DISCHARGE SUMMARY
Inpatient Discharge Summary    BRIEF OVERVIEW  Admitting Provider: Mnaoj Cameron MD  Discharge Provider: Manoj Cameron MD  Primary Care Physician at Discharge: Heike Quispe [phone]    Admission Date: 2/15/2023     Discharge Date: 2/23/2023    Primary Discharge Diagnosis  Right lower lobe pulmonary nodule    Discharge Disposition     Code Status at Discharge: Full Code    Discharge Medications     Medication List      START taking these medications    acetaminophen 325 mg tablet  Commonly known as: TYLENOL  Take 3 tablets (975 mg total) by mouth every 8 (eight) hours.  Dose: 975 mg     gabapentin 300 mg capsule  Commonly known as: NEURONTIN  Start taking on: February 21, 2023  Take 1 capsule (300 mg total) by mouth 3 (three) times a day for 14 days, THEN 1 capsule (300 mg total) 2 (two) times a day for 7 days, THEN 1 capsule (300 mg total) daily for 7 days. For post-op pain.     naproxen 500 mg tablet  Commonly known as: NAPROSYN  Take 1 tablet (500 mg total) by mouth 2 (two) times a day with meals for 14 days. For post-op pain  Dose: 500 mg        CONTINUE taking these medications    cetirizine 10 mg tablet  Commonly known as: ZyrTEC  Take 10 mg by mouth daily.  Dose: 10 mg     umeclidinium 62.5 mcg/actuation blister with device  Inhale 62.5 mcg daily.  Dose: 62.5 mcg     venlafaxine  mg 24 hr capsule  Commonly known as: EFFEXOR-XR  Take 150 mg by mouth daily.  Dose: 150 mg        ASK your doctor about these medications    traMADoL 50 mg tablet  Commonly known as: ULTRAM  Take 1 tablet (50 mg total) by mouth every 6 (six) hours as needed for pain for up to 5 days.  Dose: 50 mg  Ask about: Should I take this medication?            Outpatient Follow-Up  Encounter Information    This patient does not currently have any appointments scheduled.         Referrals and Follow-ups to Schedule     X-RAY CHEST 2 VIEWS      Release to patient: Immediate     For plain film studies completed after 8pm M-F  and after 5pm Sat. & Sun. that require an emergency read by a radiologist please call the hospital  and ask to be connected to the xray technologist in the ED.    A direct call back number must be provided to the technologist.               Test Results Pending at Discharge  Unresulted Labs (From admission, onward)     Start     Ordered    02/16/23 0600  Basic metabolic panel - AM draw  Daily      Question:  Release to patient  Answer:  Immediate   Order ID Start Status   [id number] 02/16/23 0600 Collected (02/16/23 0713)    02/17/23 0600 Scheduled    02/18/23 0600 Scheduled    02/19/23 0600 Scheduled       02/15/23 1302    02/16/23 0600  CBC - AM draw  Daily      Question:  Release to patient  Answer:  Immediate   Start Status   02/17/23 0600 Scheduled   02/18/23 0600 Scheduled   02/19/23 0600 Scheduled       02/15/23 1302    02/15/23 0639  Type and screen  Once        Question Answer Comment   Are you aware of this patient having previously identified antibodies? NO    Does this Patient have Sickle Cell Disease/Sickle Cell Anemia? NO    Release to patient Immediate        02/15/23 0638                DETAILS OF HOSPITAL STAY    Presenting Problem/History of Present Illness  Pulmonary nodule [R91.1]  Right lower lobe pulmonary nodule [R91.1]    Operative Procedures Performed  Procedure(s):  Ion Navigational Bronchoscopy, EBUS, Robot assisted thoracoscopic right lower lobectomy with en-bloc middle lobe wedge resection, mediastinal lymph node dissection, lysis of adhesions      Physical Exam on Day of Discharge  General: awake and alert, looks uncomfortable  HEENT: normocephalic, atraumatic, EOM intact, conjunctiva clear, sclera nonicteric  CV: normal rate, normotensive  Pulm: normal work of breathing, no acute distress. On room air. Two right-sided chest tubes Y-connected to WS. Serosanguinous output. No air leak with valsalva  Abd: soft, nontender, non-distended  Skin: incisions C/D/I, no surrounding  erythema  Extr: no obvious abnormality, moving extremities spontaneously  Neuro: Grossly normal    Hospital Course  Narcisa Ramirez [place] [facility] [place] for planned ion bronchoscopy, right lower lobectomy, right middle lobe wedge resection, and mediastinal lymph node dissection with Dr. Cameron. She tolerated the procedure well, was extubated, and transferred to the PACU in stable condition. She was started on clear liquids post operatively and tolerated them. Overnight her chest tube went to waterseal at midnight. POD #1 she was advanced to a regular diet, her powers catheter was removed, and she was started on SQH. She voided appropriately without any signs/symptoms of urinary retention. Her chest tubes remained in place due to high output, continued air leak on clinical exam and persistent evidence of pneumothorax on daily chest xrays. Her air leak and chest tube outputs gradually improved and on POD #8 (2/23) her chest xray showed stable small pneumothorax. A chest tube clamp trial was done and an x-ray was obtained 3 hours later. It showed increased pneumothorax. However repeat x-ray 3 hours later showed the pneumothorax was stable. Dr. Cameron was comfortable with her chest tubes being removed. Her chest tubes were subsequently pulled and no post-pull chest x-ray was needed because the clamp trial showed how the lung would respond to the chest tubes being removed. She was deemed stable for discharge home and instructed to follow-up with Dr. Cameron as an outpatient. She was sent with a prescription for a 2-view chest x-ray to obtain prior to her appointment.    Discharge Disposition  Disposition:    Destination:

## 2023-02-16 NOTE — PROGRESS NOTES
Patient: Narcisa Ramirez  Location:  [facility] [address]  MRN:  [id number]  Today's date:  2/16/2023    Narcisa is a 62 y.o. female admitted on 2/15/2023 with Pulmonary nodule [R91.1]  Right lower lobe pulmonary nodule [R91.1]. Principal problem is Right lower lobe pulmonary nodule.    Past Medical History  Narcisa has a past medical history of COPD (chronic obstructive pulmonary disease) (CMS/HCC), COVID-19 vaccine series completed, Depression, and Lung nodule.    History of Present Illness   s/p ion bronchoscopy and right robotic lower lobectomy with en-bloc middle lobe wedge resection, mediastinal lymph node dissection      PT Vitals    Date/Time Pulse HR Source SpO2 Pt Activity O2 Therapy BP BP Location BP Method Pt Position [facility]   02/16/23 1510 68 Monitor 98 % At rest None (Room air) 139/66 Right upper arm Automatic Sitting KW   02/16/23 1520 74 Monitor 97 % Walking None (Room air) 144/60 Right upper arm Automatic Sitting KW      PT Pain    Date/Time Pain Type Rating: Rest Rating: Activity Interventions [facility]   02/16/23 1510 Pain Assessment 3 7 with cough other (see comments) provided hug pillow KW          Prior Living Environment    Flowsheet Row Most Recent Value   People in Home child(sun), adult   Current Living Arrangements home   Home Accessibility stairs within home (Group), stairs to enter home (Group)   Number of Stairs, Main Entrance 5   Location, Patient Bedroom second floor, must negotiate stairs to access   Location, Bathroom first (main) floor, second floor, must negotiate stairs to access   Number of Stairs, Within Home, Primary 12        Prior Level of Function    Flowsheet Row Most Recent Value   Ambulation independent   Transferring independent   Toileting independent   Bathing independent   Dressing independent   Eating independent   Communication understands/communicates without difficulty   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   Assistive Device Currently  Used at Home none           PT Evaluation and Treatment - 02/16/23 1510        PT Time Calculation    Start Time 1510     Stop Time 1530     Time Calculation (min) 20 min        General Information    Document Type initial evaluation     Mode of Treatment physical therapy     Position at Start of Session seated in chair     Status at Start of Session agreeable to therapy;no issues or concerns identified by nurse prior to session        Precautions/Limitations/Impairments    Existing Precautions/Restrictions no known precautions/restrictions        Cognition/Psychosocial    Affect/Mental Status (Cognition) WNL     Orientation Status (Cognition) oriented x 3     Follows Commands (Cognition) WNL     Cognitive Function WNL        Sensory    Hearing Status WFL        Vision Assessment/Intervention    Visual Impairment/Limitations corrective lenses full-time        Sensory Assessment (Somatosensory)    Left LE Sensory Assessment general sensation;intact     Right LE Sensory Assessment general sensation;intact        Range of Motion (ROM)    Left Lower Extremity (ROM) left LE ROM is WFL     Right Lower Extremity (ROM) right LE ROM is WFL        Strength (Manual Muscle Testing)    Left Lower Extremity Strength left LE strength is WFL     Right Lower Extremity Strength right LE strength is WFL        Bed Mobility    [place], Supine to Sit not tested     [place], Sit to Supine not tested        Sit to Stand Transfer    [place], Sit to Stand Transfer close supervision     Assistive Device none        Stand to Sit Transfer    [place], Stand to Sit Transfer close supervision     Assistive Device none        Toilet Transfer    Transfer Technique sit-stand;stand-sit     [place], Toilet Transfer supervision     Assistive Device grab bars/safety frame        Gait Training    [place], Gait close supervision     Assistive Device none     Distance in Feet 150 feet     Deviations/Abnormal Patterns (Gait)  step length decreased   slow and guarded with GARCIA but steady       Stairs Training    [place], Stairs not tested        Safety Issues, Functional Mobility    Impairments Affecting Function (Mobility) pain;endurance/activity tolerance        Balance    Static Sitting Balance WFL;supported;sitting in chair     Dynamic Sitting Balance WFL;supported;sitting in chair     Static Standing Balance WFL;unsupported     Dynamic Standing Balance mild impairment;unsupported        Motor Skills    Functional Endurance fair        Respiratory WDL    Cough Type congested;fair;productive   encrouaged to use pillow to produce more productive cough       AM-PAC (TM) - Mobility (Current Function)    Turning from your back to your side while in a flat bed without using bedrails? 4 - None     Moving from lying on your back to sitting on the side of a flat bed without using bedrails? 3 - A Little     Moving to and from a bed to a chair? 3 - A Little     Standing up from a chair using your arms? 3 - A Little     To walk in a hospital room? 3 - A Little     Climbing 3-5 steps with a railing? 3 - A Little     AM-PAC (TM) Mobility Score 19        Session Outcome    Daily Outcome Statement pt presents with post-op pain, chest tube pain. Gait is slow and guarded but steady. GARCIA with wet, non-productive cough. Pox stable. Expect pt to progress well     Position at End of Session seated in chair     Safety Factors call light in reach     Status at End of Session personal items in reach     Nursing Notified patient's position;patient's response to therapy/activity        Plan    Rehab Potential good, to achieve stated therapy goals     Therapy Frequency 3-5 times/wk     Planned Therapy Interventions balance training;bed mobility training;gait training;transfer training;stair training;patient/family education               PT Discharge Recommendations    Flowsheet Row Most Recent Value   PT Recommended Discharge Disposition home at 02/16/2023  1510   Anticipated Equipment Needs at Discharge (PT) none at 02/16/2023 1510   Patient/Family Therapy Goals Statement to go home at 02/16/2023 1510                  Education Documentation  Treatment Plan, taught by Emelia Michel, PT at 2/16/2023  4:37 PM.  Learner: Patient  Readiness: Acceptance  Method: Explanation  Response: Verbalizes Understanding  Comment: role of PT, plan of care, safe mobility          PT Goals    Flowsheet Row Most Recent Value   Bed Mobility Goal 1    Activity/Assistive Device sit to supine, supine to sit at 02/16/2023 1510   [place] modified independence at 02/16/2023 1510   Time Frame by discharge at 02/16/2023 1510   Progress/Outcome goal ongoing at 02/16/2023 1510   Transfer Goal 1    Activity/Assistive Device sit-to-stand/stand-to-sit, no assistive device at 02/16/2023 1510   [place] modified independence at 02/16/2023 1510   Time Frame by discharge at 02/16/2023 1510   Progress/Outcome goal ongoing at 02/16/2023 1510   Gait Training Goal 1    Activity/Assistive Device gait (walking locomotion), no assistive device at 02/16/2023 1510   [place] modified independence at 02/16/2023 1510   Distance 150 at 02/16/2023 1510   Time Frame by discharge at 02/16/2023 1510   Progress/Outcome goal ongoing at 02/16/2023 1510   Stairs Goal 1    Activity/Assistive Device ascending stairs, descending stairs, using handrail, left, using handrail, right at 02/16/2023 1510   [place] modified independence at 02/16/2023 1510   Number of Stairs 12 at 02/16/2023 1510   Time Frame by discharge at 02/16/2023 1510   Progress/Outcome goal ongoing at 02/16/2023 1510

## 2023-02-16 NOTE — HOSPITAL COURSE
Narcisa is a 62 y.o. female admitted on 2/15/2023 with Pulmonary nodule [R91.1]  Right lower lobe pulmonary nodule [R91.1]. Principal problem is Right lower lobe pulmonary nodule.    Past Medical History  Narcisa has a past medical history of COPD (chronic obstructive pulmonary disease) (CMS/HCC), COVID-19 vaccine series completed, Depression, and Lung nodule.    History of Present Illness   s/p ion bronchoscopy and right robotic lower lobectomy with en-bloc middle lobe wedge resection, mediastinal lymph node dissection

## 2023-02-16 NOTE — PLAN OF CARE
Care Coordination Admission Assessment Note  Date: 2/16/2023   Time: 2:22 PM    Patient Name: Narcisa Ramirez  Medical Record Number: [id number]  YOB: 1960  Room/BED: 1011    General Information  Patient-Specific Goals (Include Timeframe)  dc to home  PCP: Heike Quispe MD   Insurance Coverage: IBC  Readmission Within the last 30 days  no previous admission in last 30 days    Advance Directives (for Healthcare)?  Does patient have advance directive?: No  Patient does not have Advance Directive: Patient/Family declines further information  Does patient have current OOH DNR form?: No  Patient does not have current OOH DNR form: Patient/Family declines further information  Does patient have current POLST?: No  Patient does not have current POLST: Patient/Family declines further information  Does patient have mental health advance directive?: No  Patient does not have Mental Health Advance Directive: Patient/Family declines further information        Information       Living Arrangements  Arrived From: home  Current Living Arrangements: home  People in Home: child(sun), adult  Home Accessibility: stairs within home (Group), stairs to enter home (Group)  Living Arrangement Comments: Lives with son in a multi level town home, 4 [address] with 1st floor powder room and full flight to second floor bedroom with full bath.  Able to Return to Prior Arrangements: yes    Housing Stability and Financial Resources (SDOH)  In the last 12 months, was there a time when you were not able to pay the mortgage or rent on time?: No  In the last 12 months, how many places have you lived?: 1  In the last 12 months, was there a time when you did not have a steady place to sleep or slept in a shelter (including now)?: No  How hard is it for you to pay for the very basics like food, housing, medical care, and heating?: Not hard at all    Current Outpatient/Agency/Support Group       Type of Current Home Care  Services  Other (Comment) (no prior home health services)    Assistive Device/Animal Currently Used at Home  none    Functional Status Comments  independent    IADL Comments  independent    Employment/ Status       Concerns to be Addressed  no discharge needs identified, denies needs/concerns at this time, adjustment to diagnosis/illness    Current Discharge Risk       Anticipated Changes Related to Illness  none    Transportation Concerns (SDOH)  Transportation Concerns: car, none  Transportation Anticipated: family or friend will provide  In the past 12 months, has lack of transportation kept you from medical appointments or from getting medications?: No  In the past 12 months, has lack of transportation kept you from meetings, work, or from getting things needed for daily living?: No    Concerns Comments  CMA completed with patient and her son at the bedside, patient lives with her son in a multi level home. pcp, pharmacy, demographics and emergency contacts all verified; no prior home health services. COVID 19/vaccinated Moderna. Per update from DCP/surgical rounds, anticipated dc to home tomorrow.  Patient currently declines dc needs or concerns.    Anticipated Clinical Needs       Anticipated Discharge Plan   Anticipated Discharge Disposition: home without assistance or services  Patient/Family Anticipates Transition to: home with family  Patient/Family Anticipated Services at Transition: none  Met with patient. Provided education and contact information for Care Coordination services.: yes  Screening complete: yes

## 2023-02-16 NOTE — DISCHARGE INSTRUCTIONS
Thoracic Surgery Discharge Instructions     You underwent an ion bronchoscopy, right lower lobectomy, right middle lobe wedge resection and mediastinal lymph node dissection with Dr. Cameron on 2/15/23    Activity:   No strenuous exercise or heavy lifting (over 10 pounds) until follow up with your provider  No driving until follow up and you have stopped taking narcotic pain (tramadol) medication  Walking is the best exercise. Walk at least 2-3 times a day and increase your distance a small amount each day. Going up and down stairs is okay   Keep your shoulder on the side of surgery limber with range of motion exercises at least 4 times a day   Continue to use your incentive spirometer at least 10 times a day     Nutrition:   Continue your regular diet     Wound Care Instructions:   Chest tube dressing may be removed after 72hours  If the chest tube site is still draining after 72hours, apply a clean dressing to the site and change it daily or as needed until it remains dry  Shower daily (remove dressing before shower and re-apply afterwards if needed)   Clean incision with soap and water. No lotions, creams, perfumes, etc. directly on incision   No tub baths until incision completely healed in about 4-6 weeks     Avoid Constipation   Drink plenty of water and fruit juices (non-diabetics)   Eat prunes, bran or fiber supplements (Metamucil)   Use over the counter stool softeners (Colace), laxatives (senokot, milk of magnesia), or suppositories (Dulcolax)   Stop medications if experience diarrhea     Medications:   Resume all home medications unless otherwise directed by your provider  Pain Management  Your discharge medications contain instructions for a very effective pain management regimen tailored specifically for our thoracic surgery patients. We have found that this regimen gets people off of narcotic pain medications quickly while effectively managing their pain.   Medications include:   Tylenol 975 mg every 8  hours, not to exceed 3000mg from all sources in a 24h period  Gabapentin 300 mg three times daily for two weeks, then 300 mg two times daily for one week, then 300mg daily for one week.   **Rinse your mouth with salt water 3-4 times daily while taking Gabapentin  Naproxen 500mg twice daily with food  Tramadol 50mg every 6 hours as needed for pain not relieved by Tylenol, Naproxen and Gabapentin  If for whatever the reason, you are having difficulty with this medication regimen, please call to discuss the issues and any concerns that you might have  Work on weaning your tramadol as soon as possible     Reasons to Call the Surgeon:   Severe and persistent shortness of breath not relieved with rest   Fever above 101.5 degrees F   Redness, swelling or drainage from incision     Follow Up Appointment:   Schedule a follow-up appointment with your surgeon 2 weeks after your discharge   Dr. Manoj Cameron [phone]  You will need to have a repeat chest xray done prior to your follow up appointment with Dr. Cameron. An electronic Rx has already been ordered. You can have your chest xray done here at the Radiology Department at [facility] (can be done on a walk-in basis) 1-2 hours prior to your follow up appointment  Please plan to follow-up with your primary care provider upon discharge    Dr. Manoj Cameron  [address] [facility] [facility], [address], [address]  [place] [address] [phone]

## 2023-02-17 ENCOUNTER — APPOINTMENT (INPATIENT)
Dept: RADIOLOGY | Facility: HOSPITAL | Age: 63
DRG: 164 | End: 2023-02-17
Attending: STUDENT IN AN ORGANIZED HEALTH CARE EDUCATION/TRAINING PROGRAM
Payer: COMMERCIAL

## 2023-02-17 LAB
ANION GAP SERPL CALC-SCNC: 10 MEQ/L (ref 3–15)
BUN SERPL-MCNC: 12 MG/DL (ref 8–20)
CALCIUM SERPL-MCNC: 8.5 MG/DL (ref 8.9–10.3)
CHLORIDE SERPL-SCNC: 107 MEQ/L (ref 98–109)
CO2 SERPL-SCNC: 23 MEQ/L (ref 22–32)
CREAT SERPL-MCNC: 0.9 MG/DL (ref 0.6–1.1)
ERYTHROCYTE [DISTWIDTH] IN BLOOD BY AUTOMATED COUNT: 12.8 % (ref 11.7–14.4)
GFR SERPL CREATININE-BSD FRML MDRD: >60 ML/MIN/1.73M*2
GLUCOSE SERPL-MCNC: 103 MG/DL (ref 70–99)
HCT VFR BLDCO AUTO: 35.7 % (ref 35–45)
HGB BLD-MCNC: 11.9 G/DL (ref 11.8–15.7)
MCH RBC QN AUTO: 31.5 PG (ref 28–33.2)
MCHC RBC AUTO-ENTMCNC: 33.3 G/DL (ref 32.2–35.5)
MCV RBC AUTO: 94.4 FL (ref 83–98)
PDW BLD AUTO: 9.2 FL (ref 9.4–12.3)
PLATELET # BLD AUTO: 280 K/UL (ref 150–369)
POTASSIUM SERPL-SCNC: 3.7 MEQ/L (ref 3.6–5.1)
RBC # BLD AUTO: 3.78 M/UL (ref 3.93–5.22)
SODIUM SERPL-SCNC: 140 MEQ/L (ref 136–144)
WBC # BLD AUTO: 9.47 K/UL (ref 3.8–10.5)

## 2023-02-17 PROCEDURE — 63700000 HC SELF-ADMINISTRABLE DRUG

## 2023-02-17 PROCEDURE — 99024 POSTOP FOLLOW-UP VISIT: CPT | Performed by: SURGERY

## 2023-02-17 PROCEDURE — 71045 X-RAY EXAM CHEST 1 VIEW: CPT

## 2023-02-17 PROCEDURE — 63700000 HC SELF-ADMINISTRABLE DRUG: Performed by: STUDENT IN AN ORGANIZED HEALTH CARE EDUCATION/TRAINING PROGRAM

## 2023-02-17 PROCEDURE — 85027 COMPLETE CBC AUTOMATED: CPT | Performed by: STUDENT IN AN ORGANIZED HEALTH CARE EDUCATION/TRAINING PROGRAM

## 2023-02-17 PROCEDURE — 63600000 HC DRUGS/DETAIL CODE: Performed by: STUDENT IN AN ORGANIZED HEALTH CARE EDUCATION/TRAINING PROGRAM

## 2023-02-17 PROCEDURE — 63600000 HC DRUGS/DETAIL CODE

## 2023-02-17 PROCEDURE — 97166 OT EVAL MOD COMPLEX 45 MIN: CPT | Mod: GO

## 2023-02-17 PROCEDURE — 36415 COLL VENOUS BLD VENIPUNCTURE: CPT | Performed by: STUDENT IN AN ORGANIZED HEALTH CARE EDUCATION/TRAINING PROGRAM

## 2023-02-17 PROCEDURE — 21400000 HC ROOM AND CARE CCU/INTERMEDIATE

## 2023-02-17 PROCEDURE — 80048 BASIC METABOLIC PNL TOTAL CA: CPT | Performed by: STUDENT IN AN ORGANIZED HEALTH CARE EDUCATION/TRAINING PROGRAM

## 2023-02-17 RX ORDER — HYDRALAZINE HYDROCHLORIDE 20 MG/ML
5 INJECTION INTRAMUSCULAR; INTRAVENOUS ONCE
Status: DISPENSED | OUTPATIENT
Start: 2023-02-17 | End: 2023-02-17

## 2023-02-17 RX ORDER — POTASSIUM CHLORIDE 750 MG/1
40 TABLET, EXTENDED RELEASE ORAL ONCE
Status: COMPLETED | OUTPATIENT
Start: 2023-02-17 | End: 2023-02-17

## 2023-02-17 RX ORDER — HYDRALAZINE HYDROCHLORIDE 20 MG/ML
10 INJECTION INTRAMUSCULAR; INTRAVENOUS ONCE
Status: DISCONTINUED | OUTPATIENT
Start: 2023-02-17 | End: 2023-02-17

## 2023-02-17 RX ORDER — HYDRALAZINE HYDROCHLORIDE 20 MG/ML
5 INJECTION INTRAMUSCULAR; INTRAVENOUS ONCE
Status: COMPLETED | OUTPATIENT
Start: 2023-02-17 | End: 2023-02-17

## 2023-02-17 RX ADMIN — HYDRALAZINE HYDROCHLORIDE 5 MG: 20 INJECTION INTRAMUSCULAR; INTRAVENOUS at 19:55

## 2023-02-17 RX ADMIN — VENLAFAXINE HYDROCHLORIDE 150 MG: 150 CAPSULE, EXTENDED RELEASE ORAL at 08:25

## 2023-02-17 RX ADMIN — HEPARIN SODIUM 5000 UNITS: 5000 INJECTION, SOLUTION INTRAVENOUS; SUBCUTANEOUS at 22:00

## 2023-02-17 RX ADMIN — ACETAMINOPHEN 975 MG: 325 TABLET ORAL at 22:00

## 2023-02-17 RX ADMIN — HEPARIN SODIUM 5000 UNITS: 5000 INJECTION, SOLUTION INTRAVENOUS; SUBCUTANEOUS at 06:01

## 2023-02-17 RX ADMIN — ACETAMINOPHEN 975 MG: 325 TABLET ORAL at 06:01

## 2023-02-17 RX ADMIN — KETOROLAC TROMETHAMINE 15 MG: 15 INJECTION, SOLUTION INTRAMUSCULAR; INTRAVENOUS at 06:00

## 2023-02-17 RX ADMIN — TIOTROPIUM BROMIDE INHALATION SPRAY 1 PUFF: 3.12 SPRAY, METERED RESPIRATORY (INHALATION) at 08:27

## 2023-02-17 RX ADMIN — HEPARIN SODIUM 5000 UNITS: 5000 INJECTION, SOLUTION INTRAVENOUS; SUBCUTANEOUS at 13:27

## 2023-02-17 RX ADMIN — DOCUSATE SODIUM 100 MG: 100 CAPSULE, LIQUID FILLED ORAL at 13:26

## 2023-02-17 RX ADMIN — DOCUSATE SODIUM 100 MG: 100 CAPSULE, LIQUID FILLED ORAL at 19:55

## 2023-02-17 RX ADMIN — TRAMADOL HYDROCHLORIDE 50 MG: 50 TABLET, COATED ORAL at 08:24

## 2023-02-17 RX ADMIN — GABAPENTIN 300 MG: 300 CAPSULE ORAL at 08:25

## 2023-02-17 RX ADMIN — ACETAMINOPHEN 975 MG: 325 TABLET ORAL at 13:26

## 2023-02-17 RX ADMIN — POTASSIUM CHLORIDE 40 MEQ: 750 TABLET, EXTENDED RELEASE ORAL at 10:30

## 2023-02-17 RX ADMIN — GABAPENTIN 300 MG: 300 CAPSULE ORAL at 13:26

## 2023-02-17 RX ADMIN — DOCUSATE SODIUM 100 MG: 100 CAPSULE, LIQUID FILLED ORAL at 08:25

## 2023-02-17 RX ADMIN — TRAMADOL HYDROCHLORIDE 50 MG: 50 TABLET, COATED ORAL at 19:55

## 2023-02-17 RX ADMIN — SENNOSIDES 1 TABLET: 8.6 TABLET ORAL at 22:00

## 2023-02-17 RX ADMIN — CETIRIZINE HYDROCHLORIDE 10 MG: 10 TABLET, FILM COATED ORAL at 08:25

## 2023-02-17 RX ADMIN — GABAPENTIN 300 MG: 300 CAPSULE ORAL at 19:55

## 2023-02-17 ASSESSMENT — COGNITIVE AND FUNCTIONAL STATUS - GENERAL
TOILETING: 3 - A LITTLE
AFFECT: WFL
DRESSING REGULAR UPPER BODY CLOTHING: 3 - A LITTLE
HELP NEEDED FOR BATHING: 3 - A LITTLE
DRESSING REGULAR LOWER BODY CLOTHING: 3 - A LITTLE
HELP NEEDED FOR PERSONAL GROOMING: 4 - NONE
EATING MEALS: 4 - NONE

## 2023-02-17 NOTE — PROGRESS NOTES
[facility] (Surgical Oncology and Thoracic Surgery) Daily Progress Note  Pager: [phone]    Subjective   POD 2 NAEO.Pain is well controlled. Denies chest pain, fever, chills, nausea, vomiting, tolerating liquids. CT to WS @ midnight with serosang output    Objective     Vital signs in last 24 hours:  Temp:  [36.5 °C (97.7 °F)-37 °C (98.6 °F)] 37 °C (98.6 °F)  Heart Rate:  [60-76] 66  Resp:  [16-18] 16  BP: (119-190)/(56-71) 190/60      Intake/Output Summary (Last 24 hours) at 2/17/2023 0811  Last data filed at 2/17/2023 0638  Gross per 24 hour   Intake 400 ml   Output 1960 ml   Net -1560 ml     Intake/Output this shift:  No intake/output data recorded.    Physical Exam  General: awake and alert, looks uncomfortable  HEENT: normocephalic, atraumatic, EOM intact, conjunctiva clear, sclera nonicteric  CV: normal rate, normotensive  Pulm: normal work of breathing, no acute distress. On room air. Two right-sided chest tubes Y-connected to WS. Serosanguinous output.    Abd: soft, nontender, non-distended  Skin: incisions C/D/I, no surrounding erythema  Extr: no obvious abnormality, moving extremities spontaneously  Neuro: Grossly normal      Labs:  BMP Results       02/15/23 02/03/23 03/06/15     1321 1154 0144     139 136    K 4.5 4.3 3.5    Cl 114 104 103    CO2 18 23 28    Glucose 147 90 100    BUN 9 9 15    Creatinine 1.0 1.0 0.8    Calcium 7.6 9.5 10.0    Anion Gap 8 12 5    EGFR 55.8 55.8 --         Comment for K at 0144 on 03/06/15: RESULTS OBTAINED ON PLASMA. PLASMA POTASSIUM VALUES MAY BE UP TO 0.4 MEQ/L LESS THAN SERUM VALUES. THE DIFFERENCES MAY BE GREATER FOR PATIENTS WITH HIGH PLATELET OR WHITE CELL COUNTS.         CBC Results       02/15/23 02/03/23 03/06/15     1321 1154 0144    WBC 11.97 8.79 8.64    RBC 3.81 4.38 4.45    HGB 12.0 13.7 14.4    HCT 36.9 41.3 41.1    MCV 96.9 94.3 92.4    MCH 31.5 31.3 32.4    MCHC 32.5 33.2 35.0     361 279          Imaging  X-RAY CHEST 1 VIEW    Result Date:  2/16/2023  IMPRESSION: 1.  Surgical changes in the right chest from right lower lobectomy, with trace right apical pneumothorax, unchanged in retrospect. 2.  Small right basilar effusion with underlying atelectasis. 3.  Subsegmental atelectasis at the left base. COMMENT: Support lines, tubes, devices, and surgical hardware, material: Right apical and basilar chest tubes in similar position. Monitoring leads/wires overly the patient. Lungs and Pleura: See impression Cardiovascular and Mediastinum: The cardiomediastinal silhouette is stable Other: Osseous structures appear unchanged. CLINICAL HISTORY:   s/p right lower lobectomy PROCEDURE: Single frontal view of the chest. COMPARISON:   Comparison is made to exam on the prior day     X-RAY CHEST 1 VIEW    Result Date: 2/15/2023  IMPRESSION: Postoperative right lung volume loss and mediastinal deviation.    FL FLUOROSCOPY TECHNICAL ASSISTANCE    Result Date: 2/16/2023  IMPRESSION: Technical assistance was provided for fluoroscopy.  Fluoro time is 2.8 minutes. Dose is 102.60 mGy.       Assessment/Plan     62 year old female s/p ion bronchoscopy and right robotic lower lobectomy with en-bloc middle lobe wedge resection, mediastinal lymph node dissection with Dr. Cameron on 2/15/23      -adv to regular diet  -Chest tube to waterseal  -Multimodal pain control (tylenol, gabapentin, PRN dilaudid, PRN tramadol)    -f/u CT output   -SQH  -FU PT/OT  -FU AM labs   -Colace & senna  -IS/OOB      Noel Cespedes MD  General Surgery Resident  Please page *Smink [phone] with any questions or concerns.

## 2023-02-17 NOTE — PLAN OF CARE
Problem: Adult Inpatient Plan of Care  Goal: Plan of Care Review  Outcome: Progressing  Flowsheets (Taken 2/17/2023 1692)  Progress: improving  Plan of Care Reviewed With: patient  Outcome Summary: OT eval completed. REC home with A for IADLs     Problem: Self-Care Deficit  Goal: Improved Ability to Complete Activities of Daily Living  Outcome: Progressing

## 2023-02-17 NOTE — PROGRESS NOTES
Patient:  Narcisa Ramirez  Location:  [facility] [address]  MRN:  [id number]  Today's date:  2/17/2023    Narcisa is a 62 y.o. female admitted on 2/15/2023 with Pulmonary nodule [R91.1]  Right lower lobe pulmonary nodule [R91.1]. Principal problem is Right lower lobe pulmonary nodule.    Past Medical History  Narcisa has a past medical history of COPD (chronic obstructive pulmonary disease) (CMS/HCC), COVID-19 vaccine series completed, Depression, and Lung nodule.    History of Present Illness   s/p ion bronchoscopy and right robotic lower lobectomy with en-bloc middle lobe wedge resection, mediastinal lymph node dissection      OT Vitals    Date/Time Pulse HR Source SpO2 Pt Activity O2 Therapy BP MAP BP Location BP Method Pt Position Observations [facility]   02/17/23 1216 62 Monitor 97 % At rest None (Room air) 176/74 -- Right upper arm Automatic Sitting -- HD   02/17/23 1228 -- -- 97 % At rest None (Room air) 174/72 after ambulating with OT -- Right upper arm Automatic Sitting after OT    02/17/23 1228 74 -- -- -- -- -- 103 mmHg -- -- -- -- SN      OT Pain    Date/Time Pain Type Location Rating: Rest Rating: Activity Interventions [facility]   02/17/23 1216 Pain Assessment flank 3 4 position adjusted;pillow support provided HD          Prior Living Environment    Flowsheet Row Most Recent Value   People in Home child(sun), adult   Current Living Arrangements home   Home Accessibility stairs within home (Group), stairs to enter home (Group)   Living Environment Comment pt lives w/ son in a [facility], 5STE, ID on 1, FF up to B/B on 2 w/ stall shower   Number of Stairs, Main Entrance 5   Location, Patient Bedroom second floor, must negotiate stairs to access   Location, Bathroom first (main) floor, second floor, must negotiate stairs to access   Number of Stairs, Within Home, Primary 12        Prior Level of Function    Flowsheet Row Most Recent Value   Dominant Hand right   Ambulation independent   Transferring  independent   Toileting independent   Bathing independent   Dressing independent   Eating independent   Communication understands/communicates without difficulty   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   Assistive Device Currently Used at Home none        Occupational Profile    Flowsheet Row Most Recent Value   Reason for Services/Referral ADL/Mobility Dysfunction   Occupational History/Life Experiences Pt is a pharmacist         OT Evaluation and Treatment - 02/17/23 1214        OT Time Calculation    Start Time 1214     Stop Time 1230     Time Calculation (min) 16 min        General Information    Document Type initial evaluation     Mode of Treatment occupational therapy     Position at Start of Session seated in chair     Status at Start of Session no issues or concerns identified by nurse prior to session;agreeable to therapy     General Observations of Patient pt received in recliner, sister and son at bedside, agreeable to therapy        Precautions/Limitations/Impairments    Existing Precautions/Restrictions other (see comments)   Chest tube       Cognition/Psychosocial    Affect/Mental Status (Cognition) WFL     Orientation Status (Cognition) oriented x 4     Follows Commands (Cognition) WFL     Cognitive Function WFL     Comment, Cognition pt very pleasant and cooperative, receptive to educ and interventions        Hearing Assessment    Hearing Status WFL        Vision Assessment/Intervention    Visual Impairment/Limitations corrective lenses full-time        Sensory Assessment    Sensory Assessment (Somatosensory) UE sensation intact        Range of Motion (ROM)    Range of Motion ROM is WFL;bilateral upper extremities        Strength (Manual Muscle Testing)    Strength (Manual Muscle Testing) strength is WFL;bilateral upper extremities        Bed Mobility    Comment (Bed Mobility) OOB t/o        Sit to Stand Transfer    [place], Sit to Stand Transfer supervision     Assistive  Device none     Comment from recliner, toilet        Stand to Sit Transfer    [place], Stand to Sit Transfer supervision     Assistive Device none     Comment at toilet, recliner        Toilet Transfer    Transfer Technique sit-stand;stand-sit     [place], Toilet Transfer supervision     Assistive Device none        Safety Issues, Functional Mobility    Impairments Affecting Function (Mobility) endurance/activity tolerance;pain        Balance    Static Sitting Balance WFL     Dynamic Sitting Balance WFL     Static Standing Balance WFL     Dynamic Standing Balance WFL     Comment, Balance no AD for mobility in room        Motor Skills    Functional Endurance elevated BP t/o, RN aware        Upper Body Dressing    Tasks don;pajama/robe     [place] close supervision     Position supported sitting        Lower Body Dressing    Tasks socks     [place] supervision     Position supported sitting     Comment socks donned prior to session, able to bring knees up toward chest to pull up socks prior to mobility        Grooming    Self-Performance oral care (brushing teeth, cleaning dentures)     [place] supervision     Position sink side;unsupported standing     Setup Assistance obtain supplies     Adaptive Equipment none        Toileting    [place] supervision;perform bladder hygiene;adjust/manage clothing     Position unsupported standing;unsupported sitting     Adaptive Equipment none        Self-Feeding    Comment not observed        BADL Safety/Performance    Impairments, BADL Safety/Performance endurance/activity tolerance;pain     Skilled BADL Treatment/Intervention BADL process/adaptation training;compensatory training;energy conservation;environmental modifications        AM-PAC (TM) - ADL (Current Function)    Putting on and taking off regular lower body clothing? 3 - A Little     Bathing? 3 - A Little     Toileting? 3 - A Little     Putting on/taking off regular upper body clothing? 3 -  A Little     How much help for taking care of personal grooming? 4 - None     Eating meals? 4 - None     AM-PAC (TM) ADL Score 20        Session Outcome    Daily Outcome Statement OT eval completed. Pt supervision for functional transfers and mobility without AD. Supervision/CS for ADLs. Anticipate progress. REC home with A for IADLs     Position at End of Session seated in chair     Safety Factors call light in reach     Status at End of Session all needs met;personal items in reach     Nursing Notified change in vital signs;patient's performance        Plan    Rehab Potential good, to achieve stated therapy goals     Therapy Frequency 3-5 times/wk     Planned Therapy Interventions activity tolerance training;BADL retraining;functional balance retraining;occupation/activity based interventions;ROM/therapeutic exercise;strengthening exercise;transfer/mobility retraining               OT Discharge Recommendations    Flowsheet Row Most Recent Value   OT Recommended Discharge Disposition home with assistance  [IADLs] at 02/17/2023 1214   Anticipated Equipment Needs At Discharge (OT) none at 02/17/2023 1214   Patient/Family Therapy Goal Statement to get better at 02/17/2023 1214                  Education Documentation  Treatment Plan, taught by Chelsea Falcon OT at 2/17/2023 12:59 PM.  Learner: Patient  Readiness: Acceptance  Method: Explanation  Response: Verbalizes Understanding, Needs Reinforcement  Comment: Role and goals of OT, plan of care, functional transfers, ADLs          OT Goals    Flowsheet Row Most Recent Value   Bed Mobility Goal 1    Activity/Assistive Device bed mobility activities, all at 02/17/2023 1214   [place] modified independence at 02/17/2023 1214   Time Frame 3-5 days at 02/17/2023 1214   Progress/Outcome goal ongoing at 02/17/2023 1214   Transfer Goal 1    Activity/Assistive Device all transfers at 02/17/2023 1214   [place] modified independence at 02/17/2023 1214   Time Frame 3-5  days at 02/17/2023 1214   Progress/Outcome goal ongoing at 02/17/2023 1214   Dressing Goal 1    Activity/Adaptive Equipment dressing skills, all at 02/17/2023 1214   [place] modified independence at 02/17/2023 1214   Time Frame 3-5 days at 02/17/2023 1214   Progress/Outcome goal ongoing at 02/17/2023 1214

## 2023-02-18 ENCOUNTER — APPOINTMENT (INPATIENT)
Dept: RADIOLOGY | Facility: HOSPITAL | Age: 63
DRG: 164 | End: 2023-02-18
Attending: STUDENT IN AN ORGANIZED HEALTH CARE EDUCATION/TRAINING PROGRAM
Payer: COMMERCIAL

## 2023-02-18 LAB
ANION GAP SERPL CALC-SCNC: 13 MEQ/L (ref 3–15)
BUN SERPL-MCNC: 8 MG/DL (ref 8–20)
CALCIUM SERPL-MCNC: 8.7 MG/DL (ref 8.9–10.3)
CHLORIDE SERPL-SCNC: 103 MEQ/L (ref 98–109)
CO2 SERPL-SCNC: 22 MEQ/L (ref 22–32)
CREAT SERPL-MCNC: 0.9 MG/DL (ref 0.6–1.1)
ERYTHROCYTE [DISTWIDTH] IN BLOOD BY AUTOMATED COUNT: 12.3 % (ref 11.7–14.4)
GFR SERPL CREATININE-BSD FRML MDRD: >60 ML/MIN/1.73M*2
GLUCOSE SERPL-MCNC: 85 MG/DL (ref 70–99)
HCT VFR BLDCO AUTO: 38.2 % (ref 35–45)
HGB BLD-MCNC: 12.8 G/DL (ref 11.8–15.7)
MCH RBC QN AUTO: 31.1 PG (ref 28–33.2)
MCHC RBC AUTO-ENTMCNC: 33.5 G/DL (ref 32.2–35.5)
MCV RBC AUTO: 92.9 FL (ref 83–98)
PDW BLD AUTO: 8.8 FL (ref 9.4–12.3)
PLATELET # BLD AUTO: 338 K/UL (ref 150–369)
POTASSIUM SERPL-SCNC: 3.5 MEQ/L (ref 3.6–5.1)
RBC # BLD AUTO: 4.11 M/UL (ref 3.93–5.22)
SODIUM SERPL-SCNC: 138 MEQ/L (ref 136–144)
WBC # BLD AUTO: 8.67 K/UL (ref 3.8–10.5)

## 2023-02-18 PROCEDURE — 85027 COMPLETE CBC AUTOMATED: CPT | Performed by: STUDENT IN AN ORGANIZED HEALTH CARE EDUCATION/TRAINING PROGRAM

## 2023-02-18 PROCEDURE — 63700000 HC SELF-ADMINISTRABLE DRUG: Performed by: STUDENT IN AN ORGANIZED HEALTH CARE EDUCATION/TRAINING PROGRAM

## 2023-02-18 PROCEDURE — 80048 BASIC METABOLIC PNL TOTAL CA: CPT | Performed by: STUDENT IN AN ORGANIZED HEALTH CARE EDUCATION/TRAINING PROGRAM

## 2023-02-18 PROCEDURE — 36415 COLL VENOUS BLD VENIPUNCTURE: CPT | Performed by: STUDENT IN AN ORGANIZED HEALTH CARE EDUCATION/TRAINING PROGRAM

## 2023-02-18 PROCEDURE — 21400000 HC ROOM AND CARE CCU/INTERMEDIATE

## 2023-02-18 PROCEDURE — 63600000 HC DRUGS/DETAIL CODE

## 2023-02-18 PROCEDURE — 71045 X-RAY EXAM CHEST 1 VIEW: CPT

## 2023-02-18 PROCEDURE — 99024 POSTOP FOLLOW-UP VISIT: CPT | Performed by: SURGERY

## 2023-02-18 PROCEDURE — 63600000 HC DRUGS/DETAIL CODE: Performed by: STUDENT IN AN ORGANIZED HEALTH CARE EDUCATION/TRAINING PROGRAM

## 2023-02-18 RX ORDER — POTASSIUM CHLORIDE 750 MG/1
40 TABLET, EXTENDED RELEASE ORAL ONCE
Status: COMPLETED | OUTPATIENT
Start: 2023-02-18 | End: 2023-02-18

## 2023-02-18 RX ORDER — KETOROLAC TROMETHAMINE 15 MG/ML
15 INJECTION, SOLUTION INTRAMUSCULAR; INTRAVENOUS EVERY 6 HOURS PRN
Status: DISCONTINUED | OUTPATIENT
Start: 2023-02-18 | End: 2023-02-21

## 2023-02-18 RX ADMIN — HEPARIN SODIUM 5000 UNITS: 5000 INJECTION, SOLUTION INTRAVENOUS; SUBCUTANEOUS at 15:00

## 2023-02-18 RX ADMIN — DOCUSATE SODIUM 100 MG: 100 CAPSULE, LIQUID FILLED ORAL at 15:00

## 2023-02-18 RX ADMIN — CETIRIZINE HYDROCHLORIDE 10 MG: 10 TABLET, FILM COATED ORAL at 10:19

## 2023-02-18 RX ADMIN — DOCUSATE SODIUM 100 MG: 100 CAPSULE, LIQUID FILLED ORAL at 10:19

## 2023-02-18 RX ADMIN — ACETAMINOPHEN 975 MG: 325 TABLET ORAL at 05:27

## 2023-02-18 RX ADMIN — VENLAFAXINE HYDROCHLORIDE 150 MG: 150 CAPSULE, EXTENDED RELEASE ORAL at 10:19

## 2023-02-18 RX ADMIN — POTASSIUM CHLORIDE 40 MEQ: 750 TABLET, EXTENDED RELEASE ORAL at 18:12

## 2023-02-18 RX ADMIN — GABAPENTIN 300 MG: 300 CAPSULE ORAL at 10:19

## 2023-02-18 RX ADMIN — HEPARIN SODIUM 5000 UNITS: 5000 INJECTION, SOLUTION INTRAVENOUS; SUBCUTANEOUS at 05:27

## 2023-02-18 RX ADMIN — SENNOSIDES 1 TABLET: 8.6 TABLET ORAL at 21:59

## 2023-02-18 RX ADMIN — TIOTROPIUM BROMIDE INHALATION SPRAY 1 PUFF: 3.12 SPRAY, METERED RESPIRATORY (INHALATION) at 10:19

## 2023-02-18 RX ADMIN — KETOROLAC TROMETHAMINE 15 MG: 15 INJECTION, SOLUTION INTRAMUSCULAR; INTRAVENOUS at 18:13

## 2023-02-18 RX ADMIN — TRAMADOL HYDROCHLORIDE 50 MG: 50 TABLET, COATED ORAL at 04:25

## 2023-02-18 RX ADMIN — GABAPENTIN 300 MG: 300 CAPSULE ORAL at 15:00

## 2023-02-18 RX ADMIN — KETOROLAC TROMETHAMINE 15 MG: 15 INJECTION, SOLUTION INTRAMUSCULAR; INTRAVENOUS at 05:27

## 2023-02-18 RX ADMIN — ACETAMINOPHEN 975 MG: 325 TABLET ORAL at 15:00

## 2023-02-18 NOTE — PROGRESS NOTES
[facility] (Surgical Oncology and Thoracic Surgery) Daily Progress Note  Pager: [phone]    Subjective   POD 3 NAEO.Pain is well controlled. Denies chest pain, fever, chills, nausea, vomiting, tolerating liquids. CT to WS with serosang output. 1 chamber air leak with valsalva.     Objective     Vital signs in last 24 hours:  Temp:  [36.2 °C (97.1 °F)-37 °C (98.6 °F)] 36.3 °C (97.4 °F)  Heart Rate:  [60-74] 66  Resp:  [16-18] 18  BP: (118-194)/(56-79) 165/71      Intake/Output Summary (Last 24 hours) at 2/18/2023 0649  Last data filed at 2/18/2023 0021  Gross per 24 hour   Intake 1060 ml   Output 2990 ml   Net -1930 ml     Intake/Output this shift:  I/O this shift:  In: 400 [P.O.:400]  Out: 1300 [Urine:1300]    Physical Exam  General: awake and alert, looks uncomfortable  HEENT: normocephalic, atraumatic, EOM intact, conjunctiva clear, sclera nonicteric  CV: normal rate, normotensive  Pulm: normal work of breathing, no acute distress. On room air. Two right-sided chest tubes Y-connected to WS. Serosanguinous output. 1 chamber airleak  Abd: soft, nontender, non-distended  Skin: incisions C/D/I, no surrounding erythema  Extr: no obvious abnormality, moving extremities spontaneously  Neuro: Grossly normal      Labs:  BMP Results       02/15/23 02/03/23 03/06/15     1321 1154 0144     139 136    K 4.5 4.3 3.5    Cl 114 104 103    CO2 18 23 28    Glucose 147 90 100    BUN 9 9 15    Creatinine 1.0 1.0 0.8    Calcium 7.6 9.5 10.0    Anion Gap 8 12 5    EGFR 55.8 55.8 --         Comment for K at 0144 on 03/06/15: RESULTS OBTAINED ON PLASMA. PLASMA POTASSIUM VALUES MAY BE UP TO 0.4 MEQ/L LESS THAN SERUM VALUES. THE DIFFERENCES MAY BE GREATER FOR PATIENTS WITH HIGH PLATELET OR WHITE CELL COUNTS.         CBC Results       02/15/23 02/03/23 03/06/15     1321 1154 0144    WBC 11.97 8.79 8.64    RBC 3.81 4.38 4.45    HGB 12.0 13.7 14.4    HCT 36.9 41.3 41.1    MCV 96.9 94.3 92.4    MCH 31.5 31.3 32.4    MCHC 32.5 33.2 35.0      361 279          Imaging  X-RAY CHEST 1 VIEW    Result Date: 2/17/2023  IMPRESSION: See above.     X-RAY CHEST 1 VIEW    Result Date: 2/16/2023  IMPRESSION: 1.  Surgical changes in the right chest from right lower lobectomy, with trace right apical pneumothorax, unchanged in retrospect. 2.  Small right basilar effusion with underlying atelectasis. 3.  Subsegmental atelectasis at the left base. COMMENT: Support lines, tubes, devices, and surgical hardware, material: Right apical and basilar chest tubes in similar position. Monitoring leads/wires overly the patient. Lungs and Pleura: See impression Cardiovascular and Mediastinum: The cardiomediastinal silhouette is stable Other: Osseous structures appear unchanged. CLINICAL HISTORY:   s/p right lower lobectomy PROCEDURE: Single frontal view of the chest. COMPARISON:   Comparison is made to exam on the prior day     X-RAY CHEST 1 VIEW    Result Date: 2/15/2023  IMPRESSION: Postoperative right lung volume loss and mediastinal deviation.    FL FLUOROSCOPY TECHNICAL ASSISTANCE    Result Date: 2/16/2023  IMPRESSION: Technical assistance was provided for fluoroscopy.  Fluoro time is 2.8 minutes. Dose is 102.60 mGy.       Assessment/Plan     62 year old female s/p ion bronchoscopy and right robotic lower lobectomy with en-bloc middle lobe wedge resection, mediastinal lymph node dissection with Dr. Cameron on 2/15/23     -will discuss with attending possibly removing CTs today and possible d/c   -regular diet  -Chest tube to waterseal  -Multimodal pain control (tylenol, gabapentin, PRN dilaudid, PRN tramadol)    -f/u CT output   -SQH  -FU PT/OT  -FU AM labs   -Colace & senna  -IS/OOB      Raymond Bills MD  General Surgery Resident  Please page *Smink [phone] with any questions or concerns.

## 2023-02-19 ENCOUNTER — APPOINTMENT (INPATIENT)
Dept: RADIOLOGY | Facility: HOSPITAL | Age: 63
DRG: 164 | End: 2023-02-19
Attending: STUDENT IN AN ORGANIZED HEALTH CARE EDUCATION/TRAINING PROGRAM
Payer: COMMERCIAL

## 2023-02-19 ENCOUNTER — APPOINTMENT (INPATIENT)
Dept: RADIOLOGY | Facility: HOSPITAL | Age: 63
DRG: 164 | End: 2023-02-19
Payer: COMMERCIAL

## 2023-02-19 LAB
ANION GAP SERPL CALC-SCNC: 9 MEQ/L (ref 3–15)
BUN SERPL-MCNC: 13 MG/DL (ref 8–20)
CALCIUM SERPL-MCNC: 8.9 MG/DL (ref 8.9–10.3)
CHLORIDE SERPL-SCNC: 104 MEQ/L (ref 98–109)
CO2 SERPL-SCNC: 25 MEQ/L (ref 22–32)
CREAT SERPL-MCNC: 0.8 MG/DL (ref 0.6–1.1)
ERYTHROCYTE [DISTWIDTH] IN BLOOD BY AUTOMATED COUNT: 12.3 % (ref 11.7–14.4)
GFR SERPL CREATININE-BSD FRML MDRD: >60 ML/MIN/1.73M*2
GLUCOSE SERPL-MCNC: 107 MG/DL (ref 70–99)
HCT VFR BLDCO AUTO: 37.8 % (ref 35–45)
HGB BLD-MCNC: 12.3 G/DL (ref 11.8–15.7)
MCH RBC QN AUTO: 30.6 PG (ref 28–33.2)
MCHC RBC AUTO-ENTMCNC: 32.5 G/DL (ref 32.2–35.5)
MCV RBC AUTO: 94 FL (ref 83–98)
PDW BLD AUTO: 9.2 FL (ref 9.4–12.3)
PLATELET # BLD AUTO: 340 K/UL (ref 150–369)
POTASSIUM SERPL-SCNC: 4.2 MEQ/L (ref 3.6–5.1)
RBC # BLD AUTO: 4.02 M/UL (ref 3.93–5.22)
SODIUM SERPL-SCNC: 138 MEQ/L (ref 136–144)
WBC # BLD AUTO: 8.1 K/UL (ref 3.8–10.5)

## 2023-02-19 PROCEDURE — 36415 COLL VENOUS BLD VENIPUNCTURE: CPT | Performed by: STUDENT IN AN ORGANIZED HEALTH CARE EDUCATION/TRAINING PROGRAM

## 2023-02-19 PROCEDURE — 63600000 HC DRUGS/DETAIL CODE: Performed by: STUDENT IN AN ORGANIZED HEALTH CARE EDUCATION/TRAINING PROGRAM

## 2023-02-19 PROCEDURE — 21400000 HC ROOM AND CARE CCU/INTERMEDIATE

## 2023-02-19 PROCEDURE — 80048 BASIC METABOLIC PNL TOTAL CA: CPT | Performed by: STUDENT IN AN ORGANIZED HEALTH CARE EDUCATION/TRAINING PROGRAM

## 2023-02-19 PROCEDURE — 99024 POSTOP FOLLOW-UP VISIT: CPT | Performed by: SURGERY

## 2023-02-19 PROCEDURE — 85027 COMPLETE CBC AUTOMATED: CPT | Performed by: STUDENT IN AN ORGANIZED HEALTH CARE EDUCATION/TRAINING PROGRAM

## 2023-02-19 PROCEDURE — 63600000 HC DRUGS/DETAIL CODE

## 2023-02-19 PROCEDURE — 63700000 HC SELF-ADMINISTRABLE DRUG: Performed by: STUDENT IN AN ORGANIZED HEALTH CARE EDUCATION/TRAINING PROGRAM

## 2023-02-19 PROCEDURE — 93005 ELECTROCARDIOGRAM TRACING: CPT | Performed by: STUDENT IN AN ORGANIZED HEALTH CARE EDUCATION/TRAINING PROGRAM

## 2023-02-19 PROCEDURE — 71045 X-RAY EXAM CHEST 1 VIEW: CPT

## 2023-02-19 RX ORDER — HYDRALAZINE HYDROCHLORIDE 20 MG/ML
10 INJECTION INTRAMUSCULAR; INTRAVENOUS ONCE
Status: ACTIVE | OUTPATIENT
Start: 2023-02-19 | End: 2023-02-20

## 2023-02-19 RX ADMIN — GABAPENTIN 300 MG: 300 CAPSULE ORAL at 20:18

## 2023-02-19 RX ADMIN — KETOROLAC TROMETHAMINE 15 MG: 15 INJECTION, SOLUTION INTRAMUSCULAR; INTRAVENOUS at 17:24

## 2023-02-19 RX ADMIN — ACETAMINOPHEN 975 MG: 325 TABLET ORAL at 13:43

## 2023-02-19 RX ADMIN — GABAPENTIN 300 MG: 300 CAPSULE ORAL at 13:44

## 2023-02-19 RX ADMIN — TRAMADOL HYDROCHLORIDE 50 MG: 50 TABLET, COATED ORAL at 02:43

## 2023-02-19 RX ADMIN — DOCUSATE SODIUM 100 MG: 100 CAPSULE, LIQUID FILLED ORAL at 09:10

## 2023-02-19 RX ADMIN — DOCUSATE SODIUM 100 MG: 100 CAPSULE, LIQUID FILLED ORAL at 13:44

## 2023-02-19 RX ADMIN — KETOROLAC TROMETHAMINE 15 MG: 15 INJECTION, SOLUTION INTRAMUSCULAR; INTRAVENOUS at 06:33

## 2023-02-19 RX ADMIN — DOCUSATE SODIUM 100 MG: 100 CAPSULE, LIQUID FILLED ORAL at 20:18

## 2023-02-19 RX ADMIN — GABAPENTIN 300 MG: 300 CAPSULE ORAL at 09:10

## 2023-02-19 RX ADMIN — HEPARIN SODIUM 5000 UNITS: 5000 INJECTION, SOLUTION INTRAVENOUS; SUBCUTANEOUS at 13:43

## 2023-02-19 RX ADMIN — ACETAMINOPHEN 975 MG: 325 TABLET ORAL at 22:01

## 2023-02-19 RX ADMIN — TRAMADOL HYDROCHLORIDE 50 MG: 50 TABLET, COATED ORAL at 18:37

## 2023-02-19 RX ADMIN — HEPARIN SODIUM 5000 UNITS: 5000 INJECTION, SOLUTION INTRAVENOUS; SUBCUTANEOUS at 22:01

## 2023-02-19 RX ADMIN — HEPARIN SODIUM 5000 UNITS: 5000 INJECTION, SOLUTION INTRAVENOUS; SUBCUTANEOUS at 05:31

## 2023-02-19 RX ADMIN — SENNOSIDES 1 TABLET: 8.6 TABLET ORAL at 22:01

## 2023-02-19 RX ADMIN — VENLAFAXINE HYDROCHLORIDE 150 MG: 150 CAPSULE, EXTENDED RELEASE ORAL at 09:09

## 2023-02-19 RX ADMIN — TIOTROPIUM BROMIDE INHALATION SPRAY 1 PUFF: 3.12 SPRAY, METERED RESPIRATORY (INHALATION) at 09:09

## 2023-02-19 RX ADMIN — CETIRIZINE HYDROCHLORIDE 10 MG: 10 TABLET, FILM COATED ORAL at 09:10

## 2023-02-19 RX ADMIN — ACETAMINOPHEN 975 MG: 325 TABLET ORAL at 05:31

## 2023-02-19 NOTE — PLAN OF CARE
Care Coordination Discharge Plan Summary   Date: 2/19/2023   Time: 11:46 AM    Patient Name: Narcisa Ramirez  Medical Record Number: [id number]  YOB: 1960  Room/BED: 1011    General Information  Patient-Specific Goals (Include Timeframe)  dc to home  PCP: Heike Quispe MD   Insurance Coverage: IBC  Readmission Within the last 30 days  no previous admission in last 30 days    Advance Directives (for Healthcare)?  Does patient have advance directive?: No  Patient does not have Advance Directive: Patient/Family declines further information  Does patient have current OOH DNR form?: No  Patient does not have current OOH DNR form: Patient/Family declines further information  Does patient have current POLST?: No  Patient does not have current POLST: Patient/Family declines further information  Does patient have mental health advance directive?: No  Patient does not have Mental Health Advance Directive: Patient/Family declines further information        Information       Living Arrangements  Arrived From: home  Current Living Arrangements: home  People in Home: child(sun), adult  Home Accessibility: stairs within home (Group), stairs to enter home (Group)  Living Arrangement Comments: Lives with son in a multi level town home, 4 [address] with 1st floor powder room and full flight to second floor bedroom with full bath.  Able to Return to Prior Arrangements: yes    Housing Stability and Financial Resources (SDOH)  In the last 12 months, was there a time when you were not able to pay the mortgage or rent on time?: No  In the last 12 months, how many places have you lived?: 1  In the last 12 months, was there a time when you did not have a steady place to sleep or slept in a shelter (including now)?: No  How hard is it for you to pay for the very basics like food, housing, medical care, and heating?: Not hard at all    Current Outpatient/Agency/Support Group       Type of Current Home Care  Services  Other (Comment) (no prior home health services)    Assistive Device/Animal Currently Used at Home  none    Functional Status Comments  independent    IADL Comments  independent    Employment/ Status       Concerns to be Addressed  no discharge needs identified, denies needs/concerns at this time, adjustment to diagnosis/illness    Current Discharge Risk       Anticipated Changes Related to Illness  none    Transportation Concerns (SDOH)  Transportation Concerns: car, none  Transportation Anticipated: family or friend will provide  In the past 12 months, has lack of transportation kept you from medical appointments or from getting medications?: No  In the past 12 months, has lack of transportation kept you from meetings, work, or from getting things needed for daily living?: No    Concerns Comments  CMA completed with patient and her son at the bedside, patient lives with her son in a multi level home. pcp, pharmacy, demographics and emergency contacts all verified; no prior home health services. COVID 19/vaccinated Moderna. Per update from DCP/surgical rounds, anticipated dc to home tomorrow.  Patient currently declines dc needs or concerns.    Anticipated Clinical Needs       Anticipated Discharge Plan   Anticipated Discharge Disposition: home without assistance or services  Patient/Family Anticipates Transition to: home with family  Patient/Family Anticipated Services at Transition: none  Met with patient. Provided education and contact information for Care Coordination services.: yes  Screening complete: yes    Discharge Assessment  Concerns to be Addressed: no discharge needs identified, denies needs/concerns at this time, adjustment to diagnosis/illness  Anticipated Changes Related to Illness: none    Concerns Comments: CMA completed with patient and her son at the bedside, patient lives with her son in a multi level home. pcp, pharmacy, demographics and emergency contacts all verified; no  prior home health services. COVID 19/vaccinated Moderna. Per update from DCP/surgical rounds, anticipated dc to home tomorrow.  Patient currently declines dc needs or concerns.    Patient Choice            Discharge Transportation   Does the patient need discharge transport arranged?: No        Discharge Barriers   Barriers to Discharge: None  Comment: Per update from w/e dcp rounds anticipate dc to home today without needs.  Care coordination to remain available until dc is complete. Patient friend/family will be providing transportation home.  Care coordination to remain available until dc is complete.  Participants: , advanced practice provider    Continued Care and Services - Admitted Since 2/15/2023    Coordination has not been started for this encounter.

## 2023-02-19 NOTE — PROGRESS NOTES
[facility] (Surgical Oncology and Thoracic Surgery) Daily Progress Note  Pager: [phone]    Subjective   NAEO.Pain is well controlled. Denies chest pain, fever, chills, nausea, vomiting, tolerating liquids. CT to WS with serosang output. 1 chamber air leak with valsalva, appears less than yesterday.    Objective     Vital signs in last 24 hours:  Temp:  [36.1 °C (97 °F)-36.6 °C (97.9 °F)] 36.6 °C (97.8 °F)  Heart Rate:  [60-92] 68  Resp:  [16-18] 18  BP: (107-180)/(53-76) 167/72      Intake/Output Summary (Last 24 hours) at 2/19/2023 0649  Last data filed at 2/18/2023 2200  Gross per 24 hour   Intake --   Output 570 ml   Net -570 ml     Intake/Output this shift:  I/O this shift:  In: -   Out: 70 [Chest Tube:70]    Physical Exam  General: awake and alert, looks uncomfortable  HEENT: normocephalic, atraumatic, EOM intact, conjunctiva clear, sclera nonicteric  CV: normal rate, normotensive  Pulm: normal work of breathing, no acute distress. On room air. Two right-sided chest tubes Y-connected to WS. Serosanguinous output. 1 chamber airleak  Abd: soft, nontender, non-distended  Skin: incisions C/D/I, no surrounding erythema  Extr: no obvious abnormality, moving extremities spontaneously  Neuro: Grossly normal      Labs:  BMP Results       02/15/23 02/03/23 03/06/15     1321 1154 0144     139 136    K 4.5 4.3 3.5    Cl 114 104 103    CO2 18 23 28    Glucose 147 90 100    BUN 9 9 15    Creatinine 1.0 1.0 0.8    Calcium 7.6 9.5 10.0    Anion Gap 8 12 5    EGFR 55.8 55.8 --         Comment for K at 0144 on 03/06/15: RESULTS OBTAINED ON PLASMA. PLASMA POTASSIUM VALUES MAY BE UP TO 0.4 MEQ/L LESS THAN SERUM VALUES. THE DIFFERENCES MAY BE GREATER FOR PATIENTS WITH HIGH PLATELET OR WHITE CELL COUNTS.         CBC Results       02/15/23 02/03/23 03/06/15     1321 1154 0144    WBC 11.97 8.79 8.64    RBC 3.81 4.38 4.45    HGB 12.0 13.7 14.4    HCT 36.9 41.3 41.1    MCV 96.9 94.3 92.4    MCH 31.5 31.3 32.4    MCHC 32.5 33.2  35.0     361 279          Imaging  X-RAY CHEST 1 VIEW    Result Date: 2/18/2023  IMPRESSION: See above. Finding:    New or increased pneumothorax   Acuity: Significant  Status:  CLOSED Critical read back was performed and results were read back by Iris  on 2/18/2023 7:51 AM.  She will alert the covering physician to read this report.     X-RAY CHEST 1 VIEW    Result Date: 2/17/2023  IMPRESSION: See above.     X-RAY CHEST 1 VIEW    Result Date: 2/16/2023  IMPRESSION: 1.  Surgical changes in the right chest from right lower lobectomy, with trace right apical pneumothorax, unchanged in retrospect. 2.  Small right basilar effusion with underlying atelectasis. 3.  Subsegmental atelectasis at the left base. COMMENT: Support lines, tubes, devices, and surgical hardware, material: Right apical and basilar chest tubes in similar position. Monitoring leads/wires overly the patient. Lungs and Pleura: See impression Cardiovascular and Mediastinum: The cardiomediastinal silhouette is stable Other: Osseous structures appear unchanged. CLINICAL HISTORY:   s/p right lower lobectomy PROCEDURE: Single frontal view of the chest. COMPARISON:   Comparison is made to exam on the prior day     X-RAY CHEST 1 VIEW    Result Date: 2/15/2023  IMPRESSION: Postoperative right lung volume loss and mediastinal deviation.    FL FLUOROSCOPY TECHNICAL ASSISTANCE    Result Date: 2/16/2023  IMPRESSION: Technical assistance was provided for fluoroscopy.  Fluoro time is 2.8 minutes. Dose is 102.60 mGy.       Assessment/Plan     62 year old female s/p ion bronchoscopy and right robotic lower lobectomy with en-bloc middle lobe wedge resection, mediastinal lymph node dissection with Dr. Cameron on 2/15/23     -will discuss with attending possibly removing CTs today and possible d/c   -regular diet  -Chest tube to waterseal  -Multimodal pain control (tylenol, gabapentin, PRN dilaudid, PRN tramadol)    -f/u CT output   -SQH  -FU PT/OT  -FU AM labs    -Colace & senna  -IS/OOB      Raymond Bills MD  General Surgery Resident  Please page *Smink [phone] with any questions or concerns.

## 2023-02-20 ENCOUNTER — APPOINTMENT (INPATIENT)
Dept: RADIOLOGY | Facility: HOSPITAL | Age: 63
DRG: 164 | End: 2023-02-20
Attending: STUDENT IN AN ORGANIZED HEALTH CARE EDUCATION/TRAINING PROGRAM
Payer: COMMERCIAL

## 2023-02-20 LAB
ANION GAP SERPL CALC-SCNC: 10 MEQ/L (ref 3–15)
ATRIAL RATE: 62
BUN SERPL-MCNC: 12 MG/DL (ref 8–20)
CALCIUM SERPL-MCNC: 9.2 MG/DL (ref 8.9–10.3)
CHLORIDE SERPL-SCNC: 104 MEQ/L (ref 98–109)
CO2 SERPL-SCNC: 27 MEQ/L (ref 22–32)
CREAT SERPL-MCNC: 0.9 MG/DL (ref 0.6–1.1)
ERYTHROCYTE [DISTWIDTH] IN BLOOD BY AUTOMATED COUNT: 12.4 % (ref 11.7–14.4)
GFR SERPL CREATININE-BSD FRML MDRD: >60 ML/MIN/1.73M*2
GLUCOSE SERPL-MCNC: 105 MG/DL (ref 70–99)
HCT VFR BLDCO AUTO: 37.8 % (ref 35–45)
HGB BLD-MCNC: 12.4 G/DL (ref 11.8–15.7)
MCH RBC QN AUTO: 31 PG (ref 28–33.2)
MCHC RBC AUTO-ENTMCNC: 32.8 G/DL (ref 32.2–35.5)
MCV RBC AUTO: 94.5 FL (ref 83–98)
P AXIS: 61
PDW BLD AUTO: 9 FL (ref 9.4–12.3)
PLATELET # BLD AUTO: 364 K/UL (ref 150–369)
POTASSIUM SERPL-SCNC: 4.6 MEQ/L (ref 3.6–5.1)
PR INTERVAL: 120
QRS DURATION: 74
QT INTERVAL: 432
QTC CALCULATION(BAZETT): 438
R AXIS: 53
RBC # BLD AUTO: 4 M/UL (ref 3.93–5.22)
SODIUM SERPL-SCNC: 141 MEQ/L (ref 136–144)
T WAVE AXIS: 25
VENTRICULAR RATE: 62
WBC # BLD AUTO: 7.57 K/UL (ref 3.8–10.5)

## 2023-02-20 PROCEDURE — 71045 X-RAY EXAM CHEST 1 VIEW: CPT

## 2023-02-20 PROCEDURE — 97116 GAIT TRAINING THERAPY: CPT | Mod: GP,CQ

## 2023-02-20 PROCEDURE — 97535 SELF CARE MNGMENT TRAINING: CPT | Mod: GO

## 2023-02-20 PROCEDURE — 36415 COLL VENOUS BLD VENIPUNCTURE: CPT | Performed by: STUDENT IN AN ORGANIZED HEALTH CARE EDUCATION/TRAINING PROGRAM

## 2023-02-20 PROCEDURE — 80048 BASIC METABOLIC PNL TOTAL CA: CPT | Performed by: STUDENT IN AN ORGANIZED HEALTH CARE EDUCATION/TRAINING PROGRAM

## 2023-02-20 PROCEDURE — 85027 COMPLETE CBC AUTOMATED: CPT | Performed by: STUDENT IN AN ORGANIZED HEALTH CARE EDUCATION/TRAINING PROGRAM

## 2023-02-20 PROCEDURE — 63600000 HC DRUGS/DETAIL CODE: Performed by: STUDENT IN AN ORGANIZED HEALTH CARE EDUCATION/TRAINING PROGRAM

## 2023-02-20 PROCEDURE — 63700000 HC SELF-ADMINISTRABLE DRUG: Performed by: STUDENT IN AN ORGANIZED HEALTH CARE EDUCATION/TRAINING PROGRAM

## 2023-02-20 PROCEDURE — 93010 ELECTROCARDIOGRAM REPORT: CPT | Performed by: INTERNAL MEDICINE

## 2023-02-20 PROCEDURE — 21400000 HC ROOM AND CARE CCU/INTERMEDIATE

## 2023-02-20 PROCEDURE — 99024 POSTOP FOLLOW-UP VISIT: CPT | Performed by: SURGERY

## 2023-02-20 PROCEDURE — 63600000 HC DRUGS/DETAIL CODE

## 2023-02-20 RX ORDER — LIDOCAINE 560 MG/1
1 PATCH PERCUTANEOUS; TOPICAL; TRANSDERMAL DAILY
Status: DISCONTINUED | OUTPATIENT
Start: 2023-02-20 | End: 2023-02-23 | Stop reason: HOSPADM

## 2023-02-20 RX ADMIN — HEPARIN SODIUM 5000 UNITS: 5000 INJECTION, SOLUTION INTRAVENOUS; SUBCUTANEOUS at 22:37

## 2023-02-20 RX ADMIN — LIDOCAINE 1 PATCH: 4 PATCH TOPICAL at 08:30

## 2023-02-20 RX ADMIN — SENNOSIDES 1 TABLET: 8.6 TABLET ORAL at 22:37

## 2023-02-20 RX ADMIN — HEPARIN SODIUM 5000 UNITS: 5000 INJECTION, SOLUTION INTRAVENOUS; SUBCUTANEOUS at 05:05

## 2023-02-20 RX ADMIN — KETOROLAC TROMETHAMINE 15 MG: 15 INJECTION, SOLUTION INTRAMUSCULAR; INTRAVENOUS at 05:05

## 2023-02-20 RX ADMIN — DOCUSATE SODIUM 100 MG: 100 CAPSULE, LIQUID FILLED ORAL at 20:28

## 2023-02-20 RX ADMIN — ACETAMINOPHEN 975 MG: 325 TABLET ORAL at 22:37

## 2023-02-20 RX ADMIN — GABAPENTIN 300 MG: 300 CAPSULE ORAL at 08:30

## 2023-02-20 RX ADMIN — GABAPENTIN 300 MG: 300 CAPSULE ORAL at 20:28

## 2023-02-20 RX ADMIN — ACETAMINOPHEN 975 MG: 325 TABLET ORAL at 05:06

## 2023-02-20 RX ADMIN — DOCUSATE SODIUM 100 MG: 100 CAPSULE, LIQUID FILLED ORAL at 08:30

## 2023-02-20 RX ADMIN — TRAMADOL HYDROCHLORIDE 50 MG: 50 TABLET, COATED ORAL at 02:01

## 2023-02-20 RX ADMIN — ACETAMINOPHEN 975 MG: 325 TABLET ORAL at 14:20

## 2023-02-20 RX ADMIN — HEPARIN SODIUM 5000 UNITS: 5000 INJECTION, SOLUTION INTRAVENOUS; SUBCUTANEOUS at 14:21

## 2023-02-20 RX ADMIN — TRAMADOL HYDROCHLORIDE 50 MG: 50 TABLET, COATED ORAL at 20:28

## 2023-02-20 RX ADMIN — VENLAFAXINE HYDROCHLORIDE 150 MG: 150 CAPSULE, EXTENDED RELEASE ORAL at 08:30

## 2023-02-20 RX ADMIN — CETIRIZINE HYDROCHLORIDE 10 MG: 10 TABLET, FILM COATED ORAL at 08:30

## 2023-02-20 RX ADMIN — DOCUSATE SODIUM 100 MG: 100 CAPSULE, LIQUID FILLED ORAL at 14:21

## 2023-02-20 RX ADMIN — TIOTROPIUM BROMIDE INHALATION SPRAY 1 PUFF: 3.12 SPRAY, METERED RESPIRATORY (INHALATION) at 08:31

## 2023-02-20 RX ADMIN — KETOROLAC TROMETHAMINE 15 MG: 15 INJECTION, SOLUTION INTRAMUSCULAR; INTRAVENOUS at 22:37

## 2023-02-20 RX ADMIN — TRAMADOL HYDROCHLORIDE 50 MG: 50 TABLET, COATED ORAL at 10:52

## 2023-02-20 RX ADMIN — GABAPENTIN 300 MG: 300 CAPSULE ORAL at 14:20

## 2023-02-20 ASSESSMENT — COGNITIVE AND FUNCTIONAL STATUS - GENERAL
HELP NEEDED FOR PERSONAL GROOMING: 4 - NONE
HELP NEEDED FOR BATHING: 3 - A LITTLE
EATING MEALS: 4 - NONE
DRESSING REGULAR LOWER BODY CLOTHING: 4 - NONE
MOVING TO AND FROM BED TO CHAIR: 4 - NONE
WALKING IN HOSPITAL ROOM: 4 - NONE
CLIMB 3 TO 5 STEPS WITH RAILING: 4 - NONE
TOILETING: 4 - NONE
AFFECT: WFL
STANDING UP FROM CHAIR USING ARMS: 4 - NONE
AFFECT: WFL
DRESSING REGULAR UPPER BODY CLOTHING: 4 - NONE

## 2023-02-20 NOTE — PROGRESS NOTES
[facility] (Surgical Oncology and Thoracic Surgery) Daily Progress Note  Pager: [phone]    Subjective   NAEO. Was having some pain overnight that has resolved this morning. Denies fever, chills, nausea, vomiting, tolerating regular diet. CXR yesterday showing persistence of pneumo, CT is now to WS with serosang output. 1 chamber air leak with valsalva (7 chambers with cough). Blood pressures very labile from 110s to 200s.    Objective     Vital signs in last 24 hours:  Temp:  [36.4 °C (97.5 °F)-36.7 °C (98 °F)] 36.6 °C (97.8 °F)  Heart Rate:  [58-70] 64  Resp:  [14-18] 14  BP: (114-221)/(56-92) 151/67      Intake/Output Summary (Last 24 hours) at 2/20/2023 0713  Last data filed at 2/20/2023 0455  Gross per 24 hour   Intake 320 ml   Output 910 ml   Net -590 ml     Intake/Output this shift:  No intake/output data recorded.    Physical Exam  General: awake and alert, looks uncomfortable  HEENT: normocephalic, atraumatic, EOM intact, conjunctiva clear, sclera nonicteric  CV: normal rate, normotensive  Pulm: normal work of breathing, no acute distress. On room air. Two right-sided chest tubes Y-connected to WS. Serosanguinous output. 1 chamber air-leak w/ valsalva  Abd: soft, nontender, non-distended  Skin: incisions C/D/I, no surrounding erythema  Extr: no obvious abnormality, moving extremities spontaneously  Neuro: Grossly normal      Labs:  BMP Results       02/15/23 02/03/23 03/06/15     1321 1154 0144     139 136    K 4.5 4.3 3.5    Cl 114 104 103    CO2 18 23 28    Glucose 147 90 100    BUN 9 9 15    Creatinine 1.0 1.0 0.8    Calcium 7.6 9.5 10.0    Anion Gap 8 12 5    EGFR 55.8 55.8 --         Comment for K at 0144 on 03/06/15: RESULTS OBTAINED ON PLASMA. PLASMA POTASSIUM VALUES MAY BE UP TO 0.4 MEQ/L LESS THAN SERUM VALUES. THE DIFFERENCES MAY BE GREATER FOR PATIENTS WITH HIGH PLATELET OR WHITE CELL COUNTS.         CBC Results       02/15/23 02/03/23 03/06/15     1321 1154 0144    WBC 11.97 8.79 8.64     RBC 3.81 4.38 4.45    HGB 12.0 13.7 14.4    HCT 36.9 41.3 41.1    MCV 96.9 94.3 92.4    MCH 31.5 31.3 32.4    MCHC 32.5 33.2 35.0     361 279          Imaging  X-RAY CHEST 1 VIEW    Result Date: 2/19/2023  IMPRESSION: Increased moderate right pneumothorax. This was conveyed over the phone to nurse Iris at 2/19/2023 6:01 PM with critical read back.    X-RAY CHEST 1 VIEW    Result Date: 2/19/2023  IMPRESSION: No interval change in postsurgical changes right hemithorax with persistent small pneumothorax     X-RAY CHEST 1 VIEW    Result Date: 2/18/2023  IMPRESSION: See above. Finding:    New or increased pneumothorax   Acuity: Significant  Status:  CLOSED Critical read back was performed and results were read back by Iris,  on 2/18/2023 7:51 AM.  She will alert the covering physician to read this report.     X-RAY CHEST 1 VIEW    Result Date: 2/17/2023  IMPRESSION: See above.     X-RAY CHEST 1 VIEW    Result Date: 2/16/2023  IMPRESSION: 1.  Surgical changes in the right chest from right lower lobectomy, with trace right apical pneumothorax, unchanged in retrospect. 2.  Small right basilar effusion with underlying atelectasis. 3.  Subsegmental atelectasis at the left base. COMMENT: Support lines, tubes, devices, and surgical hardware, material: Right apical and basilar chest tubes in similar position. Monitoring leads/wires overly the patient. Lungs and Pleura: See impression Cardiovascular and Mediastinum: The cardiomediastinal silhouette is stable Other: Osseous structures appear unchanged. CLINICAL HISTORY:   s/p right lower lobectomy PROCEDURE: Single frontal view of the chest. COMPARISON:   Comparison is made to exam on the prior day     X-RAY CHEST 1 VIEW    Result Date: 2/15/2023  IMPRESSION: Postoperative right lung volume loss and mediastinal deviation.    FL FLUOROSCOPY TECHNICAL ASSISTANCE    Result Date: 2/16/2023  IMPRESSION: Technical assistance was provided for fluoroscopy.  Fluoro time is 2.8  minutes. Dose is 102.60 mGy.       Assessment/Plan     62 year old female s/p ion bronchoscopy and right robotic lower lobectomy with en-bloc middle lobe wedge resection, mediastinal lymph node dissection with Dr. Cameron on 2/15/23     -will discuss with attending possibly removing CTs today and possible d/c   -regular diet  -Chest tube to waterseal  -Multimodal pain control (tylenol, gabapentin, PRN dilaudid, PRN tramadol)    -f/u CT output   -SQH  -FU PT/OT  -FU AM labs   -Colace & senna  -IS/OOB      Raymond Bills MD  General Surgery Resident  Please page *Smink [phone] with any questions or concerns.

## 2023-02-20 NOTE — PROGRESS NOTES
Patient: Narcisa Ramirez  Location:  [facility] [address] [address]  MRN:  [id number]  Today's date:  2/20/2023     I attest that I was present for the entire session, directed all activities, made all clinical decisions, and was responsible for all assessments.     Narcisa is a 62 y.o. female admitted on 2/15/2023 with Pulmonary nodule [R91.1]  Right lower lobe pulmonary nodule [R91.1]. Principal problem is Right lower lobe pulmonary nodule.    Past Medical History  Narcisa has a past medical history of COPD (chronic obstructive pulmonary disease) (CMS/HCC), COVID-19 vaccine series completed, Depression, and Lung nodule.    History of Present Illness   s/p ion bronchoscopy and right robotic lower lobectomy with en-bloc middle lobe wedge resection, mediastinal lymph node dissection      PT Vitals      Date/Time Pulse HR Source SpO2 Pt Activity O2 Therapy BP MAP BP Location BP Method Pt Position [facility]   02/20/23 1542 72 Monitor 95 % At rest None (Room air) 124/58 83 mmHg Left upper arm Automatic Sitting AD   02/20/23 1548 74 Monitor 91 % At rest None (Room air) 148/73 96 mmHg Left upper arm Automatic Sitting AD          PT Pain      Date/Time Pain Type Rating: Rest Rating: Activity [facility]   02/20/23 1542 Pain Reassessment 0 0 AD            Prior Living Environment      Flowsheet Row Most Recent Value   People in Home child(sun), adult   Current Living Arrangements home   Home Accessibility stairs within home (Group), stairs to enter home (Group)   Living Environment Comment pt lives w/ son in a [facility], 5STE, IN on 1, FF up to B/B on 2 w/ stall shower   Number of Stairs, Main Entrance 5   Location, Patient Bedroom second floor, must negotiate stairs to access   Location, Bathroom first (main) floor, second floor, must negotiate stairs to access   Number of Stairs, Within Home, Primary 12          Prior Level of Function      Flowsheet Row Most Recent Value   Dominant Hand right   Ambulation independent  "  Transferring independent   Toileting independent   Bathing independent   Dressing independent   Eating independent   Communication understands/communicates without difficulty   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   Assistive Device Currently Used at Home none             PT Evaluation and Treatment - 02/20/23 1542          PT Time Calculation    Start Time 1542     Stop Time 1551     Time Calculation (min) 9 min        General Information    Document Type daily treatment/progress note     Mode of Treatment individual therapy;physical therapy     Position at Start of Session seated in chair     Status at Start of Session agreeable to therapy;no issues or concerns identified by nurse prior to session        Precautions/Limitations/Impairments    Existing Precautions/Restrictions other (see comments)   chest tube       Cognition/Psychosocial    Affect/Mental Status (Cognition) WFL     Orientation Status (Cognition) oriented x 3     Follows Commands (Cognition) WFL     Cognitive Function WFL        Bed Mobility    Comment (Bed Mobility) recd OOB        Sit to Stand Transfer    [place], Sit to Stand Transfer independent     Assistive Device none     Comment from low chair, good use of LE        Stand to Sit Transfer    [place], Stand to Sit Transfer independent     Assistive Device none     Comment to low chair, good eccentric control        Gait Training    [place], Gait independent;1 person to manage equipment     Assistive Device none     Distance in Feet 174 feet     Pattern (Gait) step-through     Deviations/Abnormal Patterns (Gait) gait speed decreased     Comment (Gait/Stairs) slower gait speed due to \"stiffness\", No LOB/SOB        Stairs Training    [place], Stairs modified independence     Assistive Device railing     Handrail Location (Stairs) left side (ascending);right side (descending)     Number of Stairs 12     Ascending Stairs Technique step-over-step     " Descending Stairs Technique step-over-step     Comment Denies SOB/dizziness. Pt has no concerns with steps at home.        Mobility Belt    Mobility Belt Used for All Out of Bed Activity no     Reason Mobility Belt Not Used chest tube        Balance    Static Sitting Balance WFL;sitting in chair     Dynamic Sitting Balance WFL;sitting in chair     Sit to Stand Dynamic Balance WFL;unsupported     Static Standing Balance WFL;unsupported     Dynamic Standing Balance WFL;unsupported     Comment, Balance No AD used        AM-PAC (TM) - Mobility (Current Function)    Turning from your back to your side while in a flat bed without using bedrails? 4 - None     Moving from lying on your back to sitting on the side of a flat bed without using bedrails? 4 - None     Moving to and from a bed to a chair? 4 - None     Standing up from a chair using your arms? 4 - None     To walk in a hospital room? 4 - None     Climbing 3-5 steps with a railing? 4 - None     AM-PAC (TM) Mobility Score 24        Session Outcome    Daily Outcome Statement Pt is independent with all mobility. Needs assist with equipment. No chest tube pain. Good home support from son. Recommend home at d/c.     Position at End of Session seated in chair     Safety Factors call light in reach     Status at End of Session all needs met;personal items in reach     Nursing Notified patient's performance;patient's position        Plan    Rehab Potential good, to achieve stated therapy goals     Therapy Frequency 3-5 times/wk     Planned Therapy Interventions home exercise program;patient/family education;strengthening                   PT Discharge Recommendations      Flowsheet Row Most Recent Value   PT Recommended Discharge Disposition home at 02/20/2023 1542   Anticipated Equipment Needs at Discharge (PT) none at 02/20/2023 1542   Patient/Family Therapy Goals Statement go home at 02/20/2023 1542                    Education Documentation  Treatment Plan, taught by  Nick Pearl at 2/20/2023  4:02 PM.  Learner: Patient  Readiness: Acceptance  Method: Explanation  Response: Verbalizes Understanding  Comment: Goals of PT, stair training, incentive spirometer          PT Goals      Flowsheet Row Most Recent Value   Bed Mobility Goal 1    Activity/Assistive Device sit to supine, supine to sit at 02/16/2023 1510   [place] modified independence at 02/16/2023 1510   Time Frame by discharge at 02/16/2023 1510   Progress/Outcome goal ongoing at 02/16/2023 1510   Transfer Goal 1    Activity/Assistive Device sit-to-stand/stand-to-sit, no assistive device at 02/16/2023 1510   [place] modified independence at 02/16/2023 1510   Time Frame by discharge at 02/16/2023 1510   Progress/Outcome goal ongoing at 02/16/2023 1510   Gait Training Goal 1    Activity/Assistive Device gait (walking locomotion), no assistive device at 02/16/2023 1510   [place] modified independence at 02/16/2023 1510   Distance 150 at 02/16/2023 1510   Time Frame by discharge at 02/16/2023 1510   Progress/Outcome goal ongoing at 02/16/2023 1510   Stairs Goal 1    Activity/Assistive Device ascending stairs, descending stairs, using handrail, left, using handrail, right at 02/16/2023 1510   [place] modified independence at 02/16/2023 1510   Number of Stairs 12 at 02/16/2023 1510   Time Frame by discharge at 02/16/2023 1510   Progress/Outcome goal ongoing at 02/16/2023 1510

## 2023-02-20 NOTE — PROGRESS NOTES
Patient:  Narcisa Ramirez  Location:  [facility] [address]  MRN:  [id number]  Today's date:  2/20/2023    Rn aware and Pt cleared for OT tx. At end of session, Pt was left in chair with chair alarm on, call bell in reach and all other needs met. Verbal handoff w/ Rn at end of session to discuss Pt progress.    Narcisa is a 62 y.o. female admitted on 2/15/2023 with Pulmonary nodule [R91.1]  Right lower lobe pulmonary nodule [R91.1]. Principal problem is Right lower lobe pulmonary nodule.    Past Medical History  Narcisa has a past medical history of COPD (chronic obstructive pulmonary disease) (CMS/HCC), COVID-19 vaccine series completed, Depression, and Lung nodule.    History of Present Illness   s/p ion bronchoscopy and right robotic lower lobectomy with en-bloc middle lobe wedge resection, mediastinal lymph node dissection      OT Vitals    Date/Time Pulse HR Source SpO2 Pt Activity O2 Therapy BP BP Location BP Method Pt Position [facility]   02/20/23 1427 75 Monitor 96 % At rest None (Room air) 125/58 Left upper arm Automatic Sitting LS   02/20/23 1440 83 Monitor 96 % At rest None (Room air) -- -- -- Sitting LS      OT Pain    Date/Time Pain Type Rating: Rest Rating: Activity [facility]   02/20/23 1427 Pain Assessment 0 - no pain 0 - no pain LS          Prior Living Environment    Flowsheet Row Most Recent Value   People in Home child(sun), adult   Current Living Arrangements home   Home Accessibility stairs within home (Group), stairs to enter home (Group)   Living Environment Comment pt lives w/ son in a [facility], 5STE, NM on 1, FF up to B/B on 2 w/ stall shower   Number of Stairs, Main Entrance 5   Location, Patient Bedroom second floor, must negotiate stairs to access   Location, Bathroom first (main) floor, second floor, must negotiate stairs to access   Number of Stairs, Within Home, Primary 12        Prior Level of Function    Flowsheet Row Most Recent Value   Dominant Hand right   Ambulation  independent   Transferring independent   Toileting independent   Bathing independent   Dressing independent   Eating independent   Communication understands/communicates without difficulty   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   Assistive Device Currently Used at Home none        Occupational Profile    Flowsheet Row Most Recent Value   Reason for Services/Referral ADL/Mobility Dysfunction   Occupational History/Life Experiences Pt is a pharmacist         OT Evaluation and Treatment - 02/20/23 1423        OT Time Calculation    Start Time 1423     Stop Time 1442     Time Calculation (min) 19 min        General Information    Document Type daily treatment/progress note     Mode of Treatment occupational therapy     Position at Start of Session seated in chair     Status at Start of Session agreeable to therapy;no issues or concerns identified by nurse prior to session     General Observations of Patient Rn aware, Pt cleared        Precautions/Limitations/Impairments    Existing Precautions/Restrictions other (see comments)   Chest tube       Living Environment    Primary Care Provided by self        Cognition/Psychosocial    Affect/Mental Status (Cognition) WFL     Orientation Status (Cognition) oriented x 4     Follows Commands (Cognition) WFL     Cognitive Function WFL     Comment, Cognition A&O, following commands and meaningfully participating in OT session        Bed Mobility    [place], Supine to Sit independent     [place], Sit to Supine independent     Assistive Device none     Comment (Bed Mobility) Pt rcv'd edu/training in home-[facility] bed mob with HOB flat, performing WFL        Transfers    Transfers toilet transfer;shower transfer        Sit to Stand Transfer    [place], Sit to Stand Transfer independent     Assistive Device none     Comment from recliner, slow and steady to rise        Stand to Sit Transfer    [place], Stand to Sit Transfer independent     Assistive  Device none     Comment to recliner, good eccentric control upon descent        Toilet Transfer    Transfer Technique stand-sit;sit-stand     [place], Toilet Transfer modified independence     Assistive Device none     Comment to/from standard toilet, R side counter to assist. performed home-sim toilet transfer WFL        Shower Transfer    Transfer Technique step over, right entry     [place], Shower Transfer independent;verbal cues     Verbal Cues hand placement;safety;technique     Assistive Device none     Comment Pt rcv'd edu/training in home sim shower transfer. Pt performing WFL        Safety Issues, Functional Mobility    Impairments Affecting Function (Mobility) endurance/activity tolerance     Comment, Safety Issues/Impairments (Mobility) Pt performed func mob of HH distance with no AD at SUP, no overt LOB noted. forced rest breaks encouraged, Pt receptive        Mobility Belt    Mobility Belt Used for All Out of Bed Activity no     Reason Mobility Belt Not Used chest tube        Balance    Balance Assessment sitting static balance;sitting dynamic balance;sit to stand dynamic balance;standing static balance;standing dynamic balance     Static Sitting Balance WFL;sitting, edge of bed     Dynamic Sitting Balance WFL;sitting, edge of bed     Sit to Stand Dynamic Balance WFL;unsupported     Static Standing Balance WFL;unsupported     Dynamic Standing Balance WFL;unsupported     Balance Interventions occupation based/functional task     Comment, Balance no LOB noted, no AD. benefits from pacing and EC techniques        Motor Skills    Functional Endurance fair+        Bathing    Comment Pt rcv'd edu on safety/EC benefits of SC. Pt plans to buy, receptive to edu        Lower Body Dressing    Tasks doff;don;socks     Self-Performance dons/doffs left sock;dons/doffs right sock     [place] independent     Position edge of bed sitting     Adaptive Equipment none     Comment with crossed leg method,  performing WFL. Pt receptive to crossed leg method as LBD strategy        Grooming    Self-Performance washes, rinses and dries face;washes, rinses and dries hands;brushes/whitley hair;oral care (brushing teeth, cleaning dentures)     [place] distant supervision     Position sink side     Setup Assistance obtain supplies     Adaptive Equipment none     Comment no LOB noted,  performing WFL        Toileting    [place] adjust/manage clothing;perform bladder hygiene     Position unsupported sitting     Adaptive Equipment none     Comment on standard toilet, performing WFL        BADL Safety/Performance    Impairments, BADL Safety/Performance endurance/activity tolerance     Skilled BADL Treatment/Intervention BADL process/adaptation training;energy conservation        ADL Interventions    Energy Conservation Techniques activity adapted to sitting;activity pacing encouraged;regular rest breaks encouraged;prioritizing activities promoted;planning ahead encouraged     Self-Care (BADL) Promotion activity adapted to sitting;activity pacing encouraged;best position for BADL determined;out of bed for BADLs encouraged;rest breaks provided        AM-PAC (TM) - ADL (Current Function)    Putting on and taking off regular lower body clothing? 4 - None     Bathing? 3 - A Little     Toileting? 4 - None     Putting on/taking off regular upper body clothing? 4 - None     How much help for taking care of personal grooming? 4 - None     Eating meals? 4 - None     AM-PAC (TM) ADL Score 23        Session Outcome    Daily Outcome Statement OT tx complete. Narcisa eddy'd edu/training in home-sim transfers/ADLs. She is distance SUP-INDP with no AD for all OOB activity. All OT edu/training complete and all questions answered. Cont to rec d/c home with (A) as needed. d/c OT     Position at End of Session seated in chair     Safety Factors call light in reach     Status at End of Session all needs met;personal items in reach     Nursing  Notified change in vital signs;patient's performance;patient's position;patient's response to therapy/activity        Plan    Rehab Potential other (see comments)   d/c OT    Planned Therapy Interventions other (see comments)   all OT edu/training complete              OT Discharge Recommendations    Flowsheet Row Most Recent Value   OT Recommended Discharge Disposition home with assistance at 02/20/2023 1423   Anticipated Equipment Needs At Discharge (OT) shower chair at 02/20/2023 1423   Patient/Family Therapy Goal Statement to go home at 02/20/2023 1423                       OT Goals    Flowsheet Row Most Recent Value   Bed Mobility Goal 1    Activity/Assistive Device bed mobility activities, all at 02/17/2023 1214   [place] modified independence at 02/17/2023 1214   Time Frame 3-5 days at 02/17/2023 1214   Progress/Outcome goal met at 02/20/2023 1423   Transfer Goal 1    Activity/Assistive Device all transfers at 02/17/2023 1214   [place] modified independence at 02/17/2023 1214   Time Frame 3-5 days at 02/17/2023 1214   Progress/Outcome goal met at 02/20/2023 1423   Dressing Goal 1    Activity/Adaptive Equipment dressing skills, all at 02/17/2023 1214   [place] modified independence at 02/17/2023 1214   Time Frame 3-5 days at 02/17/2023 1214   Progress/Outcome goal met at 02/20/2023 1423

## 2023-02-21 ENCOUNTER — APPOINTMENT (INPATIENT)
Dept: RADIOLOGY | Facility: HOSPITAL | Age: 63
DRG: 164 | End: 2023-02-21
Attending: STUDENT IN AN ORGANIZED HEALTH CARE EDUCATION/TRAINING PROGRAM
Payer: COMMERCIAL

## 2023-02-21 LAB
ANION GAP SERPL CALC-SCNC: 10 MEQ/L (ref 3–15)
BUN SERPL-MCNC: 10 MG/DL (ref 8–20)
CALCIUM SERPL-MCNC: 8.9 MG/DL (ref 8.9–10.3)
CHLORIDE SERPL-SCNC: 104 MEQ/L (ref 98–109)
CO2 SERPL-SCNC: 26 MEQ/L (ref 22–32)
CREAT SERPL-MCNC: 0.9 MG/DL (ref 0.6–1.1)
ERYTHROCYTE [DISTWIDTH] IN BLOOD BY AUTOMATED COUNT: 12.4 % (ref 11.7–14.4)
GFR SERPL CREATININE-BSD FRML MDRD: >60 ML/MIN/1.73M*2
GLUCOSE SERPL-MCNC: 114 MG/DL (ref 70–99)
HCT VFR BLDCO AUTO: 36.8 % (ref 35–45)
HGB BLD-MCNC: 12.3 G/DL (ref 11.8–15.7)
MCH RBC QN AUTO: 31.3 PG (ref 28–33.2)
MCHC RBC AUTO-ENTMCNC: 33.4 G/DL (ref 32.2–35.5)
MCV RBC AUTO: 93.6 FL (ref 83–98)
PDW BLD AUTO: 8.9 FL (ref 9.4–12.3)
PLATELET # BLD AUTO: 354 K/UL (ref 150–369)
POTASSIUM SERPL-SCNC: 4 MEQ/L (ref 3.6–5.1)
RBC # BLD AUTO: 3.93 M/UL (ref 3.93–5.22)
SODIUM SERPL-SCNC: 140 MEQ/L (ref 136–144)
WBC # BLD AUTO: 7.65 K/UL (ref 3.8–10.5)

## 2023-02-21 PROCEDURE — 63600000 HC DRUGS/DETAIL CODE: Performed by: STUDENT IN AN ORGANIZED HEALTH CARE EDUCATION/TRAINING PROGRAM

## 2023-02-21 PROCEDURE — 63700000 HC SELF-ADMINISTRABLE DRUG: Performed by: STUDENT IN AN ORGANIZED HEALTH CARE EDUCATION/TRAINING PROGRAM

## 2023-02-21 PROCEDURE — 63700000 HC SELF-ADMINISTRABLE DRUG

## 2023-02-21 PROCEDURE — 71045 X-RAY EXAM CHEST 1 VIEW: CPT

## 2023-02-21 PROCEDURE — 21400000 HC ROOM AND CARE CCU/INTERMEDIATE

## 2023-02-21 PROCEDURE — 99024 POSTOP FOLLOW-UP VISIT: CPT | Performed by: SURGERY

## 2023-02-21 PROCEDURE — 63600000 HC DRUGS/DETAIL CODE: Performed by: PHYSICIAN ASSISTANT

## 2023-02-21 PROCEDURE — 63700000 HC SELF-ADMINISTRABLE DRUG: Performed by: PHYSICIAN ASSISTANT

## 2023-02-21 PROCEDURE — 63600000 HC DRUGS/DETAIL CODE

## 2023-02-21 PROCEDURE — 85027 COMPLETE CBC AUTOMATED: CPT | Performed by: STUDENT IN AN ORGANIZED HEALTH CARE EDUCATION/TRAINING PROGRAM

## 2023-02-21 PROCEDURE — 36415 COLL VENOUS BLD VENIPUNCTURE: CPT | Performed by: STUDENT IN AN ORGANIZED HEALTH CARE EDUCATION/TRAINING PROGRAM

## 2023-02-21 PROCEDURE — 80048 BASIC METABOLIC PNL TOTAL CA: CPT | Performed by: STUDENT IN AN ORGANIZED HEALTH CARE EDUCATION/TRAINING PROGRAM

## 2023-02-21 RX ORDER — OXYCODONE HYDROCHLORIDE 5 MG/1
5 TABLET ORAL EVERY 4 HOURS PRN
Status: DISCONTINUED | OUTPATIENT
Start: 2023-02-21 | End: 2023-02-23 | Stop reason: HOSPADM

## 2023-02-21 RX ORDER — GABAPENTIN 300 MG/1
CAPSULE ORAL
Qty: 63 CAPSULE | Refills: 0 | Status: SHIPPED | OUTPATIENT
Start: 2023-02-21 | End: 2023-03-21

## 2023-02-21 RX ORDER — NAPROXEN 500 MG/1
500 TABLET ORAL 2 TIMES DAILY WITH MEALS
Qty: 28 TABLET | Refills: 0 | Status: SHIPPED | OUTPATIENT
Start: 2023-02-21 | End: 2023-03-07

## 2023-02-21 RX ORDER — KETOROLAC TROMETHAMINE 15 MG/ML
15 INJECTION, SOLUTION INTRAMUSCULAR; INTRAVENOUS EVERY 6 HOURS
Status: DISCONTINUED | OUTPATIENT
Start: 2023-02-21 | End: 2023-02-23 | Stop reason: HOSPADM

## 2023-02-21 RX ORDER — CYCLOBENZAPRINE HCL 10 MG
5 TABLET ORAL ONCE
Status: COMPLETED | OUTPATIENT
Start: 2023-02-21 | End: 2023-02-21

## 2023-02-21 RX ORDER — ACETAMINOPHEN 325 MG/1
975 TABLET ORAL EVERY 8 HOURS
Qty: 30 TABLET | Refills: 0
Start: 2023-02-21

## 2023-02-21 RX ADMIN — DOCUSATE SODIUM 100 MG: 100 CAPSULE, LIQUID FILLED ORAL at 19:31

## 2023-02-21 RX ADMIN — OXYCODONE HYDROCHLORIDE 5 MG: 5 TABLET ORAL at 14:54

## 2023-02-21 RX ADMIN — CYCLOBENZAPRINE HYDROCHLORIDE 5 MG: 10 TABLET, FILM COATED ORAL at 23:23

## 2023-02-21 RX ADMIN — KETOROLAC TROMETHAMINE 15 MG: 15 INJECTION, SOLUTION INTRAMUSCULAR; INTRAVENOUS at 19:31

## 2023-02-21 RX ADMIN — ACETAMINOPHEN 975 MG: 325 TABLET ORAL at 21:15

## 2023-02-21 RX ADMIN — GABAPENTIN 300 MG: 300 CAPSULE ORAL at 08:45

## 2023-02-21 RX ADMIN — TRAMADOL HYDROCHLORIDE 50 MG: 50 TABLET, COATED ORAL at 11:21

## 2023-02-21 RX ADMIN — GABAPENTIN 300 MG: 300 CAPSULE ORAL at 19:30

## 2023-02-21 RX ADMIN — TIOTROPIUM BROMIDE INHALATION SPRAY 1 PUFF: 3.12 SPRAY, METERED RESPIRATORY (INHALATION) at 08:46

## 2023-02-21 RX ADMIN — KETOROLAC TROMETHAMINE 15 MG: 15 INJECTION, SOLUTION INTRAMUSCULAR; INTRAVENOUS at 23:23

## 2023-02-21 RX ADMIN — VENLAFAXINE HYDROCHLORIDE 150 MG: 150 CAPSULE, EXTENDED RELEASE ORAL at 08:45

## 2023-02-21 RX ADMIN — HEPARIN SODIUM 5000 UNITS: 5000 INJECTION, SOLUTION INTRAVENOUS; SUBCUTANEOUS at 13:15

## 2023-02-21 RX ADMIN — CETIRIZINE HYDROCHLORIDE 10 MG: 10 TABLET, FILM COATED ORAL at 08:45

## 2023-02-21 RX ADMIN — GABAPENTIN 300 MG: 300 CAPSULE ORAL at 13:14

## 2023-02-21 RX ADMIN — KETOROLAC TROMETHAMINE 15 MG: 15 INJECTION, SOLUTION INTRAMUSCULAR; INTRAVENOUS at 13:14

## 2023-02-21 RX ADMIN — HEPARIN SODIUM 5000 UNITS: 5000 INJECTION, SOLUTION INTRAVENOUS; SUBCUTANEOUS at 05:14

## 2023-02-21 RX ADMIN — DOCUSATE SODIUM 100 MG: 100 CAPSULE, LIQUID FILLED ORAL at 08:46

## 2023-02-21 RX ADMIN — ACETAMINOPHEN 975 MG: 325 TABLET ORAL at 05:14

## 2023-02-21 RX ADMIN — SENNOSIDES 1 TABLET: 8.6 TABLET ORAL at 21:15

## 2023-02-21 RX ADMIN — TRAMADOL HYDROCHLORIDE 50 MG: 50 TABLET, COATED ORAL at 19:31

## 2023-02-21 RX ADMIN — OXYCODONE HYDROCHLORIDE 5 MG: 5 TABLET ORAL at 20:26

## 2023-02-21 RX ADMIN — ACETAMINOPHEN 975 MG: 325 TABLET ORAL at 13:13

## 2023-02-21 RX ADMIN — HEPARIN SODIUM 5000 UNITS: 5000 INJECTION, SOLUTION INTRAVENOUS; SUBCUTANEOUS at 21:15

## 2023-02-21 RX ADMIN — LIDOCAINE 1 PATCH: 4 PATCH TOPICAL at 08:46

## 2023-02-21 NOTE — PLAN OF CARE
Care Coordination Discharge Plan Note   Date: 2/21/2023    Time: 12:28 PM    Patient Name: Narcisa Ramirez  Medical Record Number: [id number]  YOB: 1960  Room/BED: 1011    Discharge Assessment  Concerns to be Addressed: no discharge needs identified, denies needs/concerns at this time, adjustment to diagnosis/illness  Anticipated Changes Related to Illness: none    Concerns Comments: Per update fromDCP/surgical rounds, TAMMY in the next 24 hrs pending progress; plan to dc chest tube today. DCP remains home with family, son lives with patient.    Anticipated Discharge Plan  Anticipated Discharge Disposition: home without assistance or services  Patient/Family Anticipates Transition to: home with family  Patient/Family Anticipated Services at Transition: none      Patient Choice            Discharge Transportation   Does the patient need discharge transport arranged?: No        Discharge Barriers   Barriers to Discharge: Medical issues not resolved  Comment: Per update from dcp rounds, TAMMY pending chest tube removal.  Participants: social work/services, physician, nursing, occupational therapy,       Continued Care and Services - Admitted Since 2/15/2023    Coordination has not been started for this encounter.

## 2023-02-21 NOTE — PROGRESS NOTES
Pt received a blessing from the Fr. Alexandra sparks. - Charted by Rev. Blaze Dowell, Spiritual Care Coordinator, [phone]

## 2023-02-21 NOTE — PROGRESS NOTES
[facility] (Surgical Oncology and Thoracic Surgery) Daily Progress Note  Pager: [phone]    Subjective   NAEO. Denies fever, chills, nausea, vomiting, tolerating regular diet. CXR this morning showing persistence of small right pneumo, CT is to WS with serosang output.No air leak with valsalva. Blood pressures very labile.  Objective     Vital signs in last 24 hours:  Temp:  [36.1 °C (97 °F)-36.6 °C (97.8 °F)] 36.1 °C (97 °F)  Heart Rate:  [64-83] 64  Resp:  [16-21] 21  BP: (120-167)/(58-77) 139/65      Intake/Output Summary (Last 24 hours) at 2/21/2023 0949  Last data filed at 2/21/2023 0514  Gross per 24 hour   Intake 240 ml   Output 406 ml   Net -166 ml     Intake/Output this shift:  No intake/output data recorded.    Physical Exam  General: awake and alert, looks uncomfortable  HEENT: normocephalic, atraumatic, EOM intact, conjunctiva clear, sclera nonicteric  CV: normal rate, normotensive  Pulm: normal work of breathing, no acute distress. On room air. Two right-sided chest tubes Y-connected to WS. Serosanguinous output. No air-leak w/ valsalva  Abd: soft, nontender, non-distended  Skin: incisions C/D/I, no surrounding erythema  Extr: no obvious abnormality, moving extremities spontaneously  Neuro: Grossly normal      Labs:  BMP Results       02/15/23 02/03/23 03/06/15     1321 1154 0144     139 136    K 4.5 4.3 3.5    Cl 114 104 103    CO2 18 23 28    Glucose 147 90 100    BUN 9 9 15    Creatinine 1.0 1.0 0.8    Calcium 7.6 9.5 10.0    Anion Gap 8 12 5    EGFR 55.8 55.8 --         Comment for K at 0144 on 03/06/15: RESULTS OBTAINED ON PLASMA. PLASMA POTASSIUM VALUES MAY BE UP TO 0.4 MEQ/L LESS THAN SERUM VALUES. THE DIFFERENCES MAY BE GREATER FOR PATIENTS WITH HIGH PLATELET OR WHITE CELL COUNTS.         CBC Results       02/15/23 02/03/23 03/06/15     1321 1154 0144    WBC 11.97 8.79 8.64    RBC 3.81 4.38 4.45    HGB 12.0 13.7 14.4    HCT 36.9 41.3 41.1    MCV 96.9 94.3 92.4    MCH 31.5 31.3 32.4    MCHC  32.5 33.2 35.0     361 279          Imaging  X-RAY CHEST 1 VIEW    Result Date: 2/21/2023  IMPRESSION: Stable.     X-RAY CHEST 1 VIEW    Result Date: 2/20/2023  IMPRESSION: Persistent right apical and costophrenic angle pneumothoraces.    X-RAY CHEST 1 VIEW    Result Date: 2/19/2023  IMPRESSION: Increased moderate right pneumothorax. This was conveyed over the phone to nurse Iris at 2/19/2023 6:01 PM with critical read back.    X-RAY CHEST 1 VIEW    Result Date: 2/19/2023  IMPRESSION: No interval change in postsurgical changes right hemithorax with persistent small pneumothorax     X-RAY CHEST 1 VIEW    Result Date: 2/18/2023  IMPRESSION: See above. Finding:    New or increased pneumothorax   Acuity: Significant  Status:  CLOSED Critical read back was performed and results were read back by Iris,  on 2/18/2023 7:51 AM.  She will alert the covering physician to read this report.     X-RAY CHEST 1 VIEW    Result Date: 2/17/2023  IMPRESSION: See above.     X-RAY CHEST 1 VIEW    Result Date: 2/16/2023  IMPRESSION: 1.  Surgical changes in the right chest from right lower lobectomy, with trace right apical pneumothorax, unchanged in retrospect. 2.  Small right basilar effusion with underlying atelectasis. 3.  Subsegmental atelectasis at the left base. COMMENT: Support lines, tubes, devices, and surgical hardware, material: Right apical and basilar chest tubes in similar position. Monitoring leads/wires overly the patient. Lungs and Pleura: See impression Cardiovascular and Mediastinum: The cardiomediastinal silhouette is stable Other: Osseous structures appear unchanged. CLINICAL HISTORY:   s/p right lower lobectomy PROCEDURE: Single frontal view of the chest. COMPARISON:   Comparison is made to exam on the prior day     X-RAY CHEST 1 VIEW    Result Date: 2/15/2023  IMPRESSION: Postoperative right lung volume loss and mediastinal deviation.    FL FLUOROSCOPY TECHNICAL ASSISTANCE    Result Date:  2/16/2023  IMPRESSION: Technical assistance was provided for fluoroscopy.  Fluoro time is 2.8 minutes. Dose is 102.60 mGy.       Assessment/Plan     62 year old female s/p ion bronchoscopy and right robotic lower lobectomy with en-bloc middle lobe wedge resection, mediastinal lymph node dissection with Dr. Cameron on 2/15/23     -will discuss with attending possibly removing CTs today and possible d/c   -regular diet  -Chest tube to waterseal  -Multimodal pain control (tylenol, gabapentin, PRN dilaudid, PRN tramadol)    -f/u CT output   -SQH  -FU PT/OT  -FU AM labs   -Colace & senna  -IS/OOB      Raymond Silver Bills MD  General Surgery Resident  Please page *Smink [phone] with any questions or concerns.

## 2023-02-22 ENCOUNTER — APPOINTMENT (INPATIENT)
Dept: RADIOLOGY | Facility: HOSPITAL | Age: 63
DRG: 164 | End: 2023-02-22
Attending: STUDENT IN AN ORGANIZED HEALTH CARE EDUCATION/TRAINING PROGRAM
Payer: COMMERCIAL

## 2023-02-22 LAB
ANION GAP SERPL CALC-SCNC: 7 MEQ/L (ref 3–15)
BUN SERPL-MCNC: 9 MG/DL (ref 8–20)
CALCIUM SERPL-MCNC: 9 MG/DL (ref 8.9–10.3)
CHLORIDE SERPL-SCNC: 106 MEQ/L (ref 98–109)
CO2 SERPL-SCNC: 26 MEQ/L (ref 22–32)
CREAT SERPL-MCNC: 0.9 MG/DL (ref 0.6–1.1)
ERYTHROCYTE [DISTWIDTH] IN BLOOD BY AUTOMATED COUNT: 12.5 % (ref 11.7–14.4)
GFR SERPL CREATININE-BSD FRML MDRD: >60 ML/MIN/1.73M*2
GLUCOSE SERPL-MCNC: 106 MG/DL (ref 70–99)
HCT VFR BLDCO AUTO: 35.6 % (ref 35–45)
HGB BLD-MCNC: 12 G/DL (ref 11.8–15.7)
MCH RBC QN AUTO: 31.4 PG (ref 28–33.2)
MCHC RBC AUTO-ENTMCNC: 33.7 G/DL (ref 32.2–35.5)
MCV RBC AUTO: 93.2 FL (ref 83–98)
PDW BLD AUTO: 8.7 FL (ref 9.4–12.3)
PLATELET # BLD AUTO: 369 K/UL (ref 150–369)
POTASSIUM SERPL-SCNC: 4.4 MEQ/L (ref 3.6–5.1)
RBC # BLD AUTO: 3.82 M/UL (ref 3.93–5.22)
SARS-COV-2 RNA RESP QL NAA+PROBE: NEGATIVE
SODIUM SERPL-SCNC: 139 MEQ/L (ref 136–144)
WBC # BLD AUTO: 7.18 K/UL (ref 3.8–10.5)

## 2023-02-22 PROCEDURE — 63600000 HC DRUGS/DETAIL CODE: Performed by: PHYSICIAN ASSISTANT

## 2023-02-22 PROCEDURE — 21400000 HC ROOM AND CARE CCU/INTERMEDIATE

## 2023-02-22 PROCEDURE — 25000000 HC PHARMACY GENERAL: Performed by: STUDENT IN AN ORGANIZED HEALTH CARE EDUCATION/TRAINING PROGRAM

## 2023-02-22 PROCEDURE — 71045 X-RAY EXAM CHEST 1 VIEW: CPT

## 2023-02-22 PROCEDURE — 99024 POSTOP FOLLOW-UP VISIT: CPT | Performed by: SURGERY

## 2023-02-22 PROCEDURE — 63700000 HC SELF-ADMINISTRABLE DRUG: Performed by: STUDENT IN AN ORGANIZED HEALTH CARE EDUCATION/TRAINING PROGRAM

## 2023-02-22 PROCEDURE — 80048 BASIC METABOLIC PNL TOTAL CA: CPT | Performed by: STUDENT IN AN ORGANIZED HEALTH CARE EDUCATION/TRAINING PROGRAM

## 2023-02-22 PROCEDURE — 85027 COMPLETE CBC AUTOMATED: CPT | Performed by: STUDENT IN AN ORGANIZED HEALTH CARE EDUCATION/TRAINING PROGRAM

## 2023-02-22 PROCEDURE — 36415 COLL VENOUS BLD VENIPUNCTURE: CPT | Performed by: STUDENT IN AN ORGANIZED HEALTH CARE EDUCATION/TRAINING PROGRAM

## 2023-02-22 PROCEDURE — 63600000 HC DRUGS/DETAIL CODE: Performed by: STUDENT IN AN ORGANIZED HEALTH CARE EDUCATION/TRAINING PROGRAM

## 2023-02-22 PROCEDURE — U0003 INFECTIOUS AGENT DETECTION BY NUCLEIC ACID (DNA OR RNA); SEVERE ACUTE RESPIRATORY SYNDROME CORONAVIRUS 2 (SARS-COV-2) (CORONAVIRUS DISEASE [COVID-19]), AMPLIFIED PROBE TECHNIQUE, MAKING USE OF HIGH THROUGHPUT TECHNOLOGIES AS DESCRIBED BY CMS-2020-01-R: HCPCS

## 2023-02-22 RX ADMIN — HEPARIN SODIUM 5000 UNITS: 5000 INJECTION, SOLUTION INTRAVENOUS; SUBCUTANEOUS at 22:26

## 2023-02-22 RX ADMIN — KETOROLAC TROMETHAMINE 15 MG: 15 INJECTION, SOLUTION INTRAMUSCULAR; INTRAVENOUS at 05:41

## 2023-02-22 RX ADMIN — LIDOCAINE 1 PATCH: 4 PATCH TOPICAL at 08:34

## 2023-02-22 RX ADMIN — ACETAMINOPHEN 975 MG: 325 TABLET ORAL at 13:31

## 2023-02-22 RX ADMIN — HEPARIN SODIUM 5000 UNITS: 5000 INJECTION, SOLUTION INTRAVENOUS; SUBCUTANEOUS at 05:41

## 2023-02-22 RX ADMIN — ACETAMINOPHEN 975 MG: 325 TABLET ORAL at 05:41

## 2023-02-22 RX ADMIN — GABAPENTIN 300 MG: 300 CAPSULE ORAL at 20:34

## 2023-02-22 RX ADMIN — TRAMADOL HYDROCHLORIDE 50 MG: 50 TABLET, COATED ORAL at 08:33

## 2023-02-22 RX ADMIN — CETIRIZINE HYDROCHLORIDE 10 MG: 10 TABLET, FILM COATED ORAL at 08:34

## 2023-02-22 RX ADMIN — VENLAFAXINE HYDROCHLORIDE 150 MG: 150 CAPSULE, EXTENDED RELEASE ORAL at 08:34

## 2023-02-22 RX ADMIN — KETOROLAC TROMETHAMINE 15 MG: 15 INJECTION, SOLUTION INTRAMUSCULAR; INTRAVENOUS at 17:12

## 2023-02-22 RX ADMIN — ACETAMINOPHEN 975 MG: 325 TABLET ORAL at 22:26

## 2023-02-22 RX ADMIN — SENNOSIDES 1 TABLET: 8.6 TABLET ORAL at 22:27

## 2023-02-22 RX ADMIN — DOCUSATE SODIUM 100 MG: 100 CAPSULE, LIQUID FILLED ORAL at 13:31

## 2023-02-22 RX ADMIN — KETOROLAC TROMETHAMINE 15 MG: 15 INJECTION, SOLUTION INTRAMUSCULAR; INTRAVENOUS at 11:49

## 2023-02-22 RX ADMIN — TIOTROPIUM BROMIDE INHALATION SPRAY 1 PUFF: 3.12 SPRAY, METERED RESPIRATORY (INHALATION) at 09:31

## 2023-02-22 RX ADMIN — GABAPENTIN 300 MG: 300 CAPSULE ORAL at 08:34

## 2023-02-22 RX ADMIN — DOCUSATE SODIUM 100 MG: 100 CAPSULE, LIQUID FILLED ORAL at 20:34

## 2023-02-22 RX ADMIN — HEPARIN SODIUM 5000 UNITS: 5000 INJECTION, SOLUTION INTRAVENOUS; SUBCUTANEOUS at 13:31

## 2023-02-22 RX ADMIN — DOCUSATE SODIUM 100 MG: 100 CAPSULE, LIQUID FILLED ORAL at 08:34

## 2023-02-22 RX ADMIN — GABAPENTIN 300 MG: 300 CAPSULE ORAL at 13:31

## 2023-02-22 NOTE — PROGRESS NOTES
[facility] (Surgical Oncology and Thoracic Surgery) Daily Progress Note  Pager: [phone]    Subjective   NAEO. Denies fever, chills, nausea, vomiting, tolerating regular diet. CT to WS with air leak. Chief complaint of chest pain around CT site.     Objective     Vital signs in last 24 hours:  Temp:  [36.1 °C (97 °F)-37 °C (98.6 °F)] 37 °C (98.6 °F)  Heart Rate:  [58-70] 62  Resp:  [16-20] 16  BP: (108-190)/(51-82) 108/51      Intake/Output Summary (Last 24 hours) at 2/22/2023 0900  Last data filed at 2/22/2023 0600  Gross per 24 hour   Intake --   Output 40 ml   Net -40 ml     Intake/Output this shift:  No intake/output data recorded.    Physical Exam  General: awake and alert, looks uncomfortable  HEENT: normocephalic, atraumatic, EOM intact, conjunctiva clear, sclera nonicteric  CV: normal rate, normotensive  Pulm: normal work of breathing, no acute distress. On room air. Two right-sided chest tubes Y-connected to WS. Serosanguinous output. 1 chamber continuous air leak, 7 chamber air leak with cough.   Abd: soft, nontender, non-distended  Skin: incisions C/D/I, no surrounding erythema  Extr: no obvious abnormality, moving extremities spontaneously  Neuro: Grossly normal      Labs:  BMP Results       02/22/23 02/21/23 02/20/23     0546 0646 0735     140 141    K 4.4 4.0 4.6    Cl 106 104 104    CO2 26 26 27    Glucose 106 114 105    BUN 9 10 12    Creatinine 0.9 0.9 0.9    Calcium 9.0 8.9 9.2    Anion Gap 7 10 10    EGFR >60.0 >60.0 >60.0        CBC Results       02/22/23 02/21/23 02/20/23     0546 0646 0735    WBC 7.18 7.65 7.57    RBC 3.82 3.93 4.00    HGB 12.0 12.3 12.4    HCT 35.6 36.8 37.8    MCV 93.2 93.6 94.5    MCH 31.4 31.3 31.0    MCHC 33.7 33.4 32.8     354 364            Imaging  X-RAY CHEST 1 VIEW    Result Date: 2/21/2023  IMPRESSION: Stable.     X-RAY CHEST 1 VIEW    Result Date: 2/20/2023  IMPRESSION: Persistent right apical and costophrenic angle pneumothoraces.    X-RAY CHEST 1  VIEW    Result Date: 2/19/2023  IMPRESSION: Increased moderate right pneumothorax. This was conveyed over the phone to nurse Iris at 2/19/2023 6:01 PM with critical read back.    X-RAY CHEST 1 VIEW    Result Date: 2/19/2023  IMPRESSION: No interval change in postsurgical changes right hemithorax with persistent small pneumothorax     X-RAY CHEST 1 VIEW    Result Date: 2/18/2023  IMPRESSION: See above. Finding:    New or increased pneumothorax   Acuity: Significant  Status:  CLOSED Critical read back was performed and results were read back by Iris,  on 2/18/2023 7:51 AM.  She will alert the covering physician to read this report.     X-RAY CHEST 1 VIEW    Result Date: 2/17/2023  IMPRESSION: See above.     X-RAY CHEST 1 VIEW    Result Date: 2/16/2023  IMPRESSION: 1.  Surgical changes in the right chest from right lower lobectomy, with trace right apical pneumothorax, unchanged in retrospect. 2.  Small right basilar effusion with underlying atelectasis. 3.  Subsegmental atelectasis at the left base. COMMENT: Support lines, tubes, devices, and surgical hardware, material: Right apical and basilar chest tubes in similar position. Monitoring leads/wires overly the patient. Lungs and Pleura: See impression Cardiovascular and Mediastinum: The cardiomediastinal silhouette is stable Other: Osseous structures appear unchanged. CLINICAL HISTORY:   s/p right lower lobectomy PROCEDURE: Single frontal view of the chest. COMPARISON:   Comparison is made to exam on the prior day     X-RAY CHEST 1 VIEW    Result Date: 2/15/2023  IMPRESSION: Postoperative right lung volume loss and mediastinal deviation.    FL FLUOROSCOPY TECHNICAL ASSISTANCE    Result Date: 2/16/2023  IMPRESSION: Technical assistance was provided for fluoroscopy.  Fluoro time is 2.8 minutes. Dose is 102.60 mGy.       Assessment/Plan     62 year old female s/p ion bronchoscopy and right robotic lower lobectomy with en-bloc middle lobe wedge resection, mediastinal  lymph node dissection with Dr. Cameron on 2/15/23     -maintain CT to WS   -regular diet  -Multimodal pain control (tylenol, gabapentin, PRN dilaudid, PRN tramadol)    -f/u CT output   -SQH  -FU PT/OT  -FU AM labs   -Colace & senna  -IS/OOB      Kalee Moralez MD  General Surgery Resident  Please page *Smink [phone] with any questions or concerns.

## 2023-02-22 NOTE — PROGRESS NOTES
"Brief Nutrition Note    Recommendations   Continue regular diet     Clinical Course: Patient is a 62 y.o. female who was admitted on 2/15/2023 with a diagnosis of Pulmonary nodule [R91.1]  Right lower lobe pulmonary nodule [R91.1].     Past Medical History:   Diagnosis Date   • COPD (chronic obstructive pulmonary disease) (CMS/HCC)    • COVID-19 vaccine series completed    • Depression    • Lung nodule      Past Surgical History:   Procedure Laterality Date   • LUNG SURGERY         Reason for Assessment  Reason For Assessment: identified at risk by screening criteria (LOS)     [facility] Nutrition Screen Tool  Has patient lost weight without trying?: 0-->No  Has patient been eating poorly due to decreased appetite?: 0-->No  [facility] Nutrition Screen Score: 0     Nutrition/Diet History  Intake (%): 100%    Physical Findings  Last Bowel Movement: 02/21/23  Skin: surgical incision     [id number] Physical Appearance  Last Bowel Movement: 02/21/23  Skin: surgical incision     Nutrition Order  Nutrition Order: meets nutritional requirements  Nutrition Order Comments: regular     Anthropometrics  Height: 160 cm (5' 3\")     Current Weight  Weight Method: Bed scale  Weight: 57.1 kg (125 lb 14.1 oz)     Ideal Body Weight (IBW)  Ideal Body Weight (IBW) (kg): 52.72  % Ideal Body Weight: 114.18     Body Mass Index (BMI)  BMI (Calculated): 22.3     Labs/Procedures/Meds  Lab Results Reviewed: reviewed, pertinent   BMP Results       02/22/23 02/21/23 02/20/23     0546 0646 0735     140 141    K 4.4 4.0 4.6    Cl 106 104 104    CO2 26 26 27    Glucose 106 114 105    BUN 9 10 12    Creatinine 0.9 0.9 0.9    Calcium 9.0 8.9 9.2     Medications  Pertinent Medications Reviewed: reviewed, pertinent   Scheduled Meds:  • acetaminophen  975 mg oral q8h ANTONY   • cetirizine  10 mg oral Daily   • docusate sodium  100 mg oral TID   • gabapentin  300 mg oral TID   • heparin (porcine)  5,000 Units subcutaneous q8h ANTONY   • ketorolac  15 mg intravenous q6h " ANTONY   • lidocaine  1 patch Topical Daily   • senna  1 tablet oral Nightly   • tiotropium bromide  1 puff inhalation Daily   • venlafaxine XR  150 mg oral Daily     Clinical comments:  Pt admit for 2/15 ion bronchoscopy and right robotic lower lobectomy with en-bloc middle lobe wedge resection, mediastinal lymph node dissection. Screened for LOS. Eating 100% of meals, reports wt stable PTA. Continue regular diet     Monitor: PO intake, plan of care  Goals: meet 75% of needs via oral intake       Recommendations: See above       Date: 02/22/23  Signature: Jolie Reese RD

## 2023-02-22 NOTE — PROGRESS NOTES
Today, patient was offered ashes for Margarito Wednesday. Patient received ashes, blessing, prayer, and support.  Information about spiritual care provided. Spiritual Care will remain available as needed or desired. - Rev. Blaze Dowell, Spiritual Care Coordinator   [phone]

## 2023-02-23 ENCOUNTER — APPOINTMENT (INPATIENT)
Dept: RADIOLOGY | Facility: HOSPITAL | Age: 63
DRG: 164 | End: 2023-02-23
Payer: COMMERCIAL

## 2023-02-23 ENCOUNTER — APPOINTMENT (INPATIENT)
Dept: RADIOLOGY | Facility: HOSPITAL | Age: 63
DRG: 164 | End: 2023-02-23
Attending: STUDENT IN AN ORGANIZED HEALTH CARE EDUCATION/TRAINING PROGRAM
Payer: COMMERCIAL

## 2023-02-23 VITALS
RESPIRATION RATE: 18 BRPM | SYSTOLIC BLOOD PRESSURE: 125 MMHG | OXYGEN SATURATION: 95 % | DIASTOLIC BLOOD PRESSURE: 65 MMHG | TEMPERATURE: 97.6 F | HEART RATE: 67 BPM | BODY MASS INDEX: 22.2 KG/M2 | HEIGHT: 63 IN | WEIGHT: 125.3 LBS

## 2023-02-23 LAB
ANION GAP SERPL CALC-SCNC: 10 MEQ/L (ref 3–15)
BUN SERPL-MCNC: 11 MG/DL (ref 8–20)
CALCIUM SERPL-MCNC: 8.9 MG/DL (ref 8.9–10.3)
CHLORIDE SERPL-SCNC: 104 MEQ/L (ref 98–109)
CO2 SERPL-SCNC: 27 MEQ/L (ref 22–32)
CREAT SERPL-MCNC: 0.9 MG/DL (ref 0.6–1.1)
ERYTHROCYTE [DISTWIDTH] IN BLOOD BY AUTOMATED COUNT: 12.5 % (ref 11.7–14.4)
GFR SERPL CREATININE-BSD FRML MDRD: >60 ML/MIN/1.73M*2
GLUCOSE SERPL-MCNC: 107 MG/DL (ref 70–99)
HCT VFR BLDCO AUTO: 36.5 % (ref 35–45)
HGB BLD-MCNC: 12.1 G/DL (ref 11.8–15.7)
MCH RBC QN AUTO: 31.3 PG (ref 28–33.2)
MCHC RBC AUTO-ENTMCNC: 33.2 G/DL (ref 32.2–35.5)
MCV RBC AUTO: 94.3 FL (ref 83–98)
PDW BLD AUTO: 8.8 FL (ref 9.4–12.3)
PLATELET # BLD AUTO: 380 K/UL (ref 150–369)
POTASSIUM SERPL-SCNC: 4.7 MEQ/L (ref 3.6–5.1)
RBC # BLD AUTO: 3.87 M/UL (ref 3.93–5.22)
SODIUM SERPL-SCNC: 141 MEQ/L (ref 136–144)
WBC # BLD AUTO: 8.68 K/UL (ref 3.8–10.5)

## 2023-02-23 PROCEDURE — 63600000 HC DRUGS/DETAIL CODE: Performed by: PHYSICIAN ASSISTANT

## 2023-02-23 PROCEDURE — 63600000 HC DRUGS/DETAIL CODE: Performed by: STUDENT IN AN ORGANIZED HEALTH CARE EDUCATION/TRAINING PROGRAM

## 2023-02-23 PROCEDURE — 200200 PR NO CHARGE: Performed by: SURGERY

## 2023-02-23 PROCEDURE — 63700000 HC SELF-ADMINISTRABLE DRUG: Performed by: STUDENT IN AN ORGANIZED HEALTH CARE EDUCATION/TRAINING PROGRAM

## 2023-02-23 PROCEDURE — 80048 BASIC METABOLIC PNL TOTAL CA: CPT | Performed by: STUDENT IN AN ORGANIZED HEALTH CARE EDUCATION/TRAINING PROGRAM

## 2023-02-23 PROCEDURE — 36415 COLL VENOUS BLD VENIPUNCTURE: CPT | Performed by: STUDENT IN AN ORGANIZED HEALTH CARE EDUCATION/TRAINING PROGRAM

## 2023-02-23 PROCEDURE — 85027 COMPLETE CBC AUTOMATED: CPT | Performed by: STUDENT IN AN ORGANIZED HEALTH CARE EDUCATION/TRAINING PROGRAM

## 2023-02-23 PROCEDURE — 99024 POSTOP FOLLOW-UP VISIT: CPT | Performed by: SURGERY

## 2023-02-23 PROCEDURE — 71045 X-RAY EXAM CHEST 1 VIEW: CPT

## 2023-02-23 RX ADMIN — HEPARIN SODIUM 5000 UNITS: 5000 INJECTION, SOLUTION INTRAVENOUS; SUBCUTANEOUS at 05:37

## 2023-02-23 RX ADMIN — KETOROLAC TROMETHAMINE 15 MG: 15 INJECTION, SOLUTION INTRAMUSCULAR; INTRAVENOUS at 00:07

## 2023-02-23 RX ADMIN — GABAPENTIN 300 MG: 300 CAPSULE ORAL at 13:15

## 2023-02-23 RX ADMIN — DOCUSATE SODIUM 100 MG: 100 CAPSULE, LIQUID FILLED ORAL at 13:15

## 2023-02-23 RX ADMIN — TIOTROPIUM BROMIDE INHALATION SPRAY 1 PUFF: 3.12 SPRAY, METERED RESPIRATORY (INHALATION) at 08:09

## 2023-02-23 RX ADMIN — ACETAMINOPHEN 975 MG: 325 TABLET ORAL at 05:36

## 2023-02-23 RX ADMIN — VENLAFAXINE HYDROCHLORIDE 150 MG: 150 CAPSULE, EXTENDED RELEASE ORAL at 08:08

## 2023-02-23 RX ADMIN — GABAPENTIN 300 MG: 300 CAPSULE ORAL at 08:08

## 2023-02-23 RX ADMIN — KETOROLAC TROMETHAMINE 15 MG: 15 INJECTION, SOLUTION INTRAMUSCULAR; INTRAVENOUS at 15:11

## 2023-02-23 RX ADMIN — HEPARIN SODIUM 5000 UNITS: 5000 INJECTION, SOLUTION INTRAVENOUS; SUBCUTANEOUS at 13:15

## 2023-02-23 RX ADMIN — DOCUSATE SODIUM 100 MG: 100 CAPSULE, LIQUID FILLED ORAL at 08:08

## 2023-02-23 RX ADMIN — CETIRIZINE HYDROCHLORIDE 10 MG: 10 TABLET, FILM COATED ORAL at 08:08

## 2023-02-23 NOTE — NURSING NOTE
Went over dc instructions with pt, encouraged and answered all questions. Removed IVs and telemonitor. Walked pt to [place] with son.

## 2023-02-23 NOTE — PROGRESS NOTES
[facility] (Surgical Oncology and Thoracic Surgery) Daily Progress Note  Pager: [phone]    Subjective   NAEO. Denies fever, chills, nausea, vomiting, tolerating regular diet. CT to WS with no air leak. BP improved.    Objective     Vital signs in last 24 hours:  Temp:  [36.1 °C (97 °F)-36.8 °C (98.3 °F)] 36.7 °C (98.1 °F)  Heart Rate:  [62-71] 65  Resp:  [14-18] 14  BP: (111-137)/(53-65) 117/58      Intake/Output Summary (Last 24 hours) at 2/23/2023 0808  Last data filed at 2/23/2023 0500  Gross per 24 hour   Intake 120 ml   Output 815 ml   Net -695 ml     Intake/Output this shift:  No intake/output data recorded.    Physical Exam  General: awake and alert, looks uncomfortable  HEENT: normocephalic, atraumatic, EOM intact, conjunctiva clear, sclera nonicteric  CV: normal rate, normotensive  Pulm: normal work of breathing, no acute distress. On room air. Two right-sided chest tubes Y-connected to WS. Serosanguinous output. No air leak with valsalva  Abd: soft, nontender, non-distended  Skin: incisions C/D/I, no surrounding erythema  Extr: no obvious abnormality, moving extremities spontaneously  Neuro: Grossly normal      Labs:  BMP Results       02/22/23 02/21/23 02/20/23     0546 0646 0735     140 141    K 4.4 4.0 4.6    Cl 106 104 104    CO2 26 26 27    Glucose 106 114 105    BUN 9 10 12    Creatinine 0.9 0.9 0.9    Calcium 9.0 8.9 9.2    Anion Gap 7 10 10    EGFR >60.0 >60.0 >60.0        CBC Results       02/22/23 02/21/23 02/20/23     0546 0646 0735    WBC 7.18 7.65 7.57    RBC 3.82 3.93 4.00    HGB 12.0 12.3 12.4    HCT 35.6 36.8 37.8    MCV 93.2 93.6 94.5    MCH 31.4 31.3 31.0    MCHC 33.7 33.4 32.8     354 364            Imaging  X-RAY CHEST 1 VIEW    Result Date: 2/22/2023  IMPRESSION: Stable chest.     X-RAY CHEST 1 VIEW    Result Date: 2/21/2023  IMPRESSION: Stable.     X-RAY CHEST 1 VIEW    Result Date: 2/20/2023  IMPRESSION: Persistent right apical and costophrenic angle  pneumothoraces.    X-RAY CHEST 1 VIEW    Result Date: 2/19/2023  IMPRESSION: Increased moderate right pneumothorax. This was conveyed over the phone to nurse Iris at 2/19/2023 6:01 PM with critical read back.    X-RAY CHEST 1 VIEW    Result Date: 2/19/2023  IMPRESSION: No interval change in postsurgical changes right hemithorax with persistent small pneumothorax     X-RAY CHEST 1 VIEW    Result Date: 2/18/2023  IMPRESSION: See above. Finding:    New or increased pneumothorax   Acuity: Significant  Status:  CLOSED Critical read back was performed and results were read back by Iris,  on 2/18/2023 7:51 AM.  She will alert the covering physician to read this report.     X-RAY CHEST 1 VIEW    Result Date: 2/17/2023  IMPRESSION: See above.     X-RAY CHEST 1 VIEW    Result Date: 2/16/2023  IMPRESSION: 1.  Surgical changes in the right chest from right lower lobectomy, with trace right apical pneumothorax, unchanged in retrospect. 2.  Small right basilar effusion with underlying atelectasis. 3.  Subsegmental atelectasis at the left base. COMMENT: Support lines, tubes, devices, and surgical hardware, material: Right apical and basilar chest tubes in similar position. Monitoring leads/wires overly the patient. Lungs and Pleura: See impression Cardiovascular and Mediastinum: The cardiomediastinal silhouette is stable Other: Osseous structures appear unchanged. CLINICAL HISTORY:   s/p right lower lobectomy PROCEDURE: Single frontal view of the chest. COMPARISON:   Comparison is made to exam on the prior day     X-RAY CHEST 1 VIEW    Result Date: 2/15/2023  IMPRESSION: Postoperative right lung volume loss and mediastinal deviation.    FL FLUOROSCOPY TECHNICAL ASSISTANCE    Result Date: 2/16/2023  IMPRESSION: Technical assistance was provided for fluoroscopy.  Fluoro time is 2.8 minutes. Dose is 102.60 mGy.       Assessment/Plan     62 year old female s/p ion bronchoscopy and right robotic lower lobectomy with en-bloc middle  lobe wedge resection, mediastinal lymph node dissection with Dr. Cameron on 2/15/23     -maintain CT to WS   -regular diet  -Multimodal pain control (tylenol, gabapentin, PRN dilaudid, PRN tramadol)    -f/u CT output   -SQH  -FU PT/OT  -FU AM labs   -Colace & senna  -IS/OOB      Raymond Bills MD  General Surgery Resident  Please page *Smink [phone] with any questions or concerns.

## 2023-02-23 NOTE — PLAN OF CARE
Plan of Care Review  Plan of Care Reviewed With: patient  Progress: improving  Outcome Summary: patient is AAOx3 Vss on room air. Chest tube in place. tylenol and toradol was given for pain. patient void in bathroom. bed in low position and call light within.

## 2023-02-24 NOTE — PLAN OF CARE
Care Coordination Discharge Plan Summary   Date: 2/24/2023   Time: 6:35 PM    Patient Name: Narcisa Ramirez  Medical Record Number: [id number]  YOB: 1960  Room/BED: 1011    General Information  Patient-Specific Goals (Include Timeframe)  dc to home  PCP: Heike Quispe MD   Insurance Coverage: IBC  Readmission Within the last 30 days  no previous admission in last 30 days    Advance Directives (for Healthcare)?  Does patient have advance directive?: No  Patient does not have Advance Directive: Patient/Family declines further information  Does patient have current OOH DNR form?: No  Patient does not have current OOH DNR form: Patient/Family declines further information  Does patient have current POLST?: No  Patient does not have current POLST: Patient/Family declines further information  Does patient have mental health advance directive?: No  Patient does not have Mental Health Advance Directive: Patient/Family declines further information        Information       Living Arrangements  Arrived From: home  Current Living Arrangements: home  People in Home: child(sun), adult  Home Accessibility: stairs within home (Group), stairs to enter home (Group)  Living Arrangement Comments: Lives with son in a multi level town home, 4 [address] with 1st floor powder room and full flight to second floor bedroom with full bath.  Able to Return to Prior Arrangements: yes    Housing Stability and Financial Resources (SDOH)  In the last 12 months, was there a time when you were not able to pay the mortgage or rent on time?: No  In the last 12 months, how many places have you lived?: 1  In the last 12 months, was there a time when you did not have a steady place to sleep or slept in a shelter (including now)?: No  How hard is it for you to pay for the very basics like food, housing, medical care, and heating?: Not hard at all    Current Outpatient/Agency/Support Group       Type of Current Home Care  Services  Other (Comment) (no prior home health services)    Assistive Device/Animal Currently Used at Home  none    Functional Status Comments  independent    IADL Comments  independent    Employment/ Status       Concerns to be Addressed  no discharge needs identified, denies needs/concerns at this time, adjustment to diagnosis/illness    Current Discharge Risk       Anticipated Changes Related to Illness  none    Transportation Concerns (SDOH)  Transportation Concerns: car, none  Transportation Anticipated: family or friend will provide  In the past 12 months, has lack of transportation kept you from medical appointments or from getting medications?: No  In the past 12 months, has lack of transportation kept you from meetings, work, or from getting things needed for daily living?: No    Concerns Comments  Per update fromDCP/surgical rounds, TAMMY in the next 24 hrs pending progress; plan to dc chest tube today. DCP remains home with family, son lives with patient.    Anticipated Clinical Needs       Anticipated Discharge Plan   Anticipated Discharge Disposition: home without assistance or services  Patient/Family Anticipates Transition to: home with family  Patient/Family Anticipated Services at Transition: none  Met with patient. Provided education and contact information for Care Coordination services.: yes  Screening complete: yes    Discharge Assessment  Concerns to be Addressed: no discharge needs identified, denies needs/concerns at this time, adjustment to diagnosis/illness  Anticipated Changes Related to Illness: none    Concerns Comments: Per update fromDCP/surgical rounds, TAMMY in the next 24 hrs pending progress; plan to dc chest tube today. DCP remains home with family, son lives with patient.    Patient Choice            Discharge Transportation   Does the patient need discharge transport arranged?: No        Discharge Barriers   Barriers to Discharge: Medical issues not resolved  Comment: Per  update from dcp rounds, TAMMY pending chest tube removal.  Participants: social work/services, physician, nursing, occupational therapy,     Continued Care and Services - Discharged on 2/23/2023 Admission date: 2/15/2023 - Discharge disposition: Home    Coordination has not been started for this encounter.

## 2023-02-27 ENCOUNTER — PATIENT OUTREACH (OUTPATIENT)
Dept: RADIATION ONCOLOGY | Facility: HOSPITAL | Age: 63
End: 2023-02-27
Payer: COMMERCIAL

## 2023-03-17 ENCOUNTER — HOSPITAL ENCOUNTER (OUTPATIENT)
Dept: RADIOLOGY | Facility: HOSPITAL | Age: 63
Discharge: HOME | End: 2023-03-17
Payer: COMMERCIAL

## 2023-03-17 ENCOUNTER — OFFICE VISIT (OUTPATIENT)
Dept: THORACIC SURGERY | Facility: CLINIC | Age: 63
End: 2023-03-17
Payer: COMMERCIAL

## 2023-03-17 VITALS
DIASTOLIC BLOOD PRESSURE: 80 MMHG | BODY MASS INDEX: 21.62 KG/M2 | OXYGEN SATURATION: 98 % | HEIGHT: 63 IN | RESPIRATION RATE: 20 BRPM | HEART RATE: 70 BPM | WEIGHT: 122 LBS | SYSTOLIC BLOOD PRESSURE: 140 MMHG

## 2023-03-17 DIAGNOSIS — R91.1 LUNG NODULE: Primary | ICD-10-CM

## 2023-03-17 DIAGNOSIS — C34.31 MALIGNANT NEOPLASM OF LOWER LOBE OF RIGHT LUNG (CMS/HCC): ICD-10-CM

## 2023-03-17 DIAGNOSIS — R91.1 RIGHT LOWER LOBE PULMONARY NODULE: ICD-10-CM

## 2023-03-17 PROCEDURE — 99024 POSTOP FOLLOW-UP VISIT: CPT | Performed by: SURGERY

## 2023-03-17 PROCEDURE — 71046 X-RAY EXAM CHEST 2 VIEWS: CPT

## 2023-03-17 NOTE — PROGRESS NOTES
Thoracic Surgery Note    Date:  3/17/2023    Date of Surgery / Procedure: 2/15/2023; Ion bronch with biopsy and EBUS; Robot assisted thoracoscopic right lower lobectomy with en-bloc middle lobe wedge resection, mediastinal lymph node dissection, lysis of adhesions    CC:Routine Surgical Follow-up     HPI:Narcisa Ramirez is a 62 y.o. female current active 25-pack-year smoker who began experiencing small-volume hemoptysis about 3 months ago.  She eventually saw her primary care physician where chest x-ray showed an abnormality in the right lower lobe followed by CT chest showing a 2.5 cm right lower lobe lung nodule.  Follow-up PET/CT showed been nodule to be significantly FDG avid with a mildly avid right hilar lymph node.  No sites of distant metastatic disease.    She was taken to the OR on 2/15 and underwent ion bronch with biopsy and EBUS; Robot assisted thoracoscopic right lower lobectomy with en-bloc middle lobe wedge resection, mediastinal lymph node dissection, lysis of adhesions.  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  All lymph nodes were negative.    She has already returned to work.  She is doing well overall she has some soreness from the chest tube insertion sites.  She also has some numbness in her right upper abdomen which is not uncommon.  She denies fevers, chills, sweats, cough, shortness of breath.          Objective:    BP Temp Pulse Resp SpO2                     Exam:  Respiratory: Normal expansion.  Clear to auscultation.  No rales, rhonchi, or wheezing.  Cardiac: Regular rate and rhythm  Edema: No lower extremity edema present..   Abdomen:soft, nontender and non-distended.   Incision: healing well, no significant drainage, no dehiscence. Chest tube stitches in place.     Current Meds:  •  acetaminophen  •  cetirizine  •  umeclidinium  •  venlafaxine XR  •  gabapentin  •  naproxen    Imaging:    Independent review of  most recent imaging and all relevant previous imaging was compared with the reading of the attending radiologist. Significant findings are as below:    Chest xray reviewed no evidence of pneumothorax or pleural effusion.       Assessment/Plan   Narcisa is a 62-year-old female current smoker, found to have a 2.5 cm lung nodule on a CT chest.  Following a PET scan, she was taken to the operating room on and underwent ion bronch with biopsy and EBUS; Robot assisted thoracoscopic right lower lobectomy with en-bloc middle lobe wedge resection, mediastinal lymph node dissection, lysis of adhesions.  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  All lymph nodes were negative.    She presents today for routine surgical follow-up.  She is doing well overall and has returned to work.  We have referred her to Dr. Tiffany Mccracken for EGFR testing given the size of the tumor.    We will see her back in 6 months with a CT chest, which I have ordered today. we have encouraged her to call the office with any questions or concerns.      Thank you for allowing us to participate in the care of your patient.    Sayda Olvera PA-C  [facility]  Medical Office [facility], [address]  [address] [facility]  [facility]  [phone]      This document was generated using voice recognition software. Please disregard any inadvertent, and unrecognized discrepancies. Should there be any confusion relating to any discrepancy, please telephone for clarification.

## 2023-03-17 NOTE — LETTER
March 17, 2023     Heike Quispe MD  [address] [facility] [place] [address]    Patient: Narcisa Ramirez  YOB: 1960  Date of Visit: 3/17/2023      Dear Dr. Quispe:    Thank you for referring Narcisa Ramirez to me for evaluation. Below are my notes for this consultation.    If you have questions, please do not hesitate to call me. I look forward to following your patient along with you.         Sincerely,        Manoj Cameron MD        CC: MD Wade Chen Katie N, PA C  3/17/2023 10:49 AM  Sign when Signing Visit  Thoracic Surgery Note    Date:  3/17/2023    Date of Surgery / Procedure: 2/15/2023; Ion bronch with biopsy and EBUS; Robot assisted thoracoscopic right lower lobectomy with en-bloc middle lobe wedge resection, mediastinal lymph node dissection, lysis of adhesions    CC:Routine Surgical Follow-up     HPI:Narcisa Ramirez is a 62 y.o. female current active 25-pack-year smoker who began experiencing small-volume hemoptysis about 3 months ago.  She eventually saw her primary care physician where chest x-ray showed an abnormality in the right lower lobe followed by CT chest showing a 2.5 cm right lower lobe lung nodule.  Follow-up PET/CT showed been nodule to be significantly FDG avid with a mildly avid right hilar lymph node.  No sites of distant metastatic disease.    She was taken to the OR on 2/15 and underwent ion bronch with biopsy and EBUS; Robot assisted thoracoscopic right lower lobectomy with en-bloc middle lobe wedge resection, mediastinal lymph node dissection, lysis of adhesions.  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  All lymph nodes were negative.    She has already returned to work.  She is doing well overall she has some soreness from the chest tube insertion sites.  She also has some numbness in her right upper abdomen which is not uncommon.  She denies fevers, chills, sweats, cough,  shortness of breath.          Objective:    BP Temp Pulse Resp SpO2                     Exam:  Respiratory: Normal expansion.  Clear to auscultation.  No rales, rhonchi, or wheezing.  Cardiac: Regular rate and rhythm  Edema: No lower extremity edema present..   Abdomen:soft, nontender and non-distended.   Incision: healing well, no significant drainage, no dehiscence. Chest tube stitches in place.     Current Meds:  •  acetaminophen  •  cetirizine  •  umeclidinium  •  venlafaxine XR  •  gabapentin  •  naproxen    Imaging:    Independent review of most recent imaging and all relevant previous imaging was compared with the reading of the attending radiologist. Significant findings are as below:    Chest xray reviewed no evidence of pneumothorax or pleural effusion.       Assessment/Plan   Narcisa is a 62-year-old female current smoker, found to have a 2.5 cm lung nodule on a CT chest.  Following a PET scan, she was taken to the operating room on and underwent ion bronch with biopsy and EBUS; Robot assisted thoracoscopic right lower lobectomy with en-bloc middle lobe wedge resection, mediastinal lymph node dissection, lysis of adhesions.  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  All lymph nodes were negative.    She presents today for routine surgical follow-up.  She is doing well overall and has returned to work.  We have referred her to Dr. Tiffany Mccracken for EGFR testing given the size of the tumor.    We will see her back in 6 months with a CT chest, which I have ordered today. we have encouraged her to call the office with any questions or concerns.      Thank you for allowing us to participate in the care of your patient.    Sayda Olvera PA-C  [facility]  Medical Office [facility], [address]  [address] [place] [address]  [phone]      This document was generated using voice recognition software. Please  disregard any inadvertent, and unrecognized discrepancies. Should there be any confusion relating to any discrepancy, please telephone for clarification.

## 2023-03-29 LAB
CASE RPRT: NORMAL
CLINICAL INFO: NORMAL
IMMUNE STAIN STUDY: NORMAL
PATH REPORT.ADDENDUM SPEC: NORMAL
PATH REPORT.ADDENDUM SPEC: NORMAL
PATH REPORT.FINAL DX SPEC: NORMAL
PATH REPORT.FINAL DX SPEC: NORMAL
PATH REPORT.GROSS SPEC: NORMAL
PATH REPORT.INTRAOP OBS SPEC DOC: NORMAL
PATHOLOGY SYNOPTIC REPORT: NORMAL

## 2023-07-19 ENCOUNTER — TRANSCRIBE ORDERS (OUTPATIENT)
Dept: SCHEDULING | Age: 63
End: 2023-07-19

## 2023-07-19 DIAGNOSIS — R92.8 OTHER ABNORMAL AND INCONCLUSIVE FINDINGS ON DIAGNOSTIC IMAGING OF BREAST: Primary | ICD-10-CM

## 2023-08-18 ENCOUNTER — TRANSCRIBE ORDERS (OUTPATIENT)
Dept: RADIOLOGY | Age: 63
End: 2023-08-18

## 2023-08-18 DIAGNOSIS — R92.8 OTHER ABNORMAL AND INCONCLUSIVE FINDINGS ON DIAGNOSTIC IMAGING OF BREAST: Primary | ICD-10-CM

## 2023-09-13 ENCOUNTER — TRANSCRIBE ORDERS (OUTPATIENT)
Dept: SCHEDULING | Age: 63
End: 2023-09-13

## 2023-09-18 ENCOUNTER — HOSPITAL ENCOUNTER (OUTPATIENT)
Dept: RADIOLOGY | Age: 63
Discharge: HOME | End: 2023-09-18
Attending: PHYSICIAN ASSISTANT
Payer: COMMERCIAL

## 2023-09-18 DIAGNOSIS — R91.1 LUNG NODULE: ICD-10-CM

## 2023-09-18 PROCEDURE — 71250 CT THORAX DX C-: CPT

## 2023-09-22 ENCOUNTER — OFFICE VISIT (OUTPATIENT)
Dept: THORACIC SURGERY | Facility: CLINIC | Age: 63
End: 2023-09-22
Payer: COMMERCIAL

## 2023-09-22 VITALS
WEIGHT: 124 LBS | RESPIRATION RATE: 20 BRPM | SYSTOLIC BLOOD PRESSURE: 140 MMHG | OXYGEN SATURATION: 96 % | DIASTOLIC BLOOD PRESSURE: 80 MMHG | HEART RATE: 76 BPM | HEIGHT: 63 IN | BODY MASS INDEX: 21.97 KG/M2

## 2023-09-22 DIAGNOSIS — C34.31 MALIGNANT NEOPLASM OF LOWER LOBE OF RIGHT LUNG (CMS/HCC): Primary | ICD-10-CM

## 2023-09-22 PROCEDURE — 99212 OFFICE O/P EST SF 10 MIN: CPT | Performed by: PHYSICIAN ASSISTANT

## 2023-09-22 PROCEDURE — 3008F BODY MASS INDEX DOCD: CPT | Performed by: PHYSICIAN ASSISTANT

## 2023-09-22 NOTE — PROGRESS NOTES
THORACIC SURGERY PROGRESS NOTE      Patient ID: Narcisa Ramirez  : 1960  MRN: [id number]                                            Visit Date: 2023  Encounter Provider: Sayda Olvera    HPI: Narcisa Ramirez is a 63 y.o. female who began experiencing small-volume hemoptysis about 3 months ago.  She eventually saw her primary care physician where chest x-ray showed an abnormality in the right lower lobe followed by CT chest showing a 2.5 cm right lower lobe lung nodule.  Follow-up PET/CT showed been nodule to be significantly FDG avid with a mildly avid right hilar lymph node.  No sites of distant metastatic disease.     She was taken to the OR on 2/15 and underwent ion bronch with biopsy and EBUS; Robot assisted thoracoscopic right lower lobectomy with en-bloc middle lobe wedge resection, mediastinal lymph node dissection, lysis of adhesions.  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  All lymph nodes were negative.     3/17/23: She has already returned to work.  She is doing well overall she has some soreness from the chest tube insertion sites.  She also has some numbness in her right upper abdomen which is not uncommon.  She denies fevers, chills, sweats, cough, shortness of breath.    23: Narcisa presents today for routine 6 month follow-up. She overall has been doing well in the last 6 months, however around labor day she developed an upper respiratory infection which was treated with Augmentin and steroids. While she is feeling better, she feels it is taking her longer to recover. Prior to the URI she denies any fevers, chills, sweats, cough, hemoptysis, weight loss, headaches, dizziness or chest pain. Since the URI she has had decreased appetite and a slight weight loss.         Medical History:   Past Medical History:   Diagnosis Date    COPD (chronic obstructive pulmonary disease) (CMS/HCC)     COVID-19 vaccine  "series completed     Depression     Lung nodule        Surgical History:   Past Surgical History:   Procedure Laterality Date    LUNG SURGERY         Social History:   Social History     Socioeconomic History    Marital status:      Spouse name: None    Number of children: None    Years of education: None    Highest education level: None   Tobacco Use    Smoking status: Former     Packs/day: 0.75     Years: 36.00     Additional pack years: 0.00     Total pack years: 27.00     Types: Cigarettes     Start date:      Quit date: 2022     Years since quittin.6    Smokeless tobacco: Never    Tobacco comments:     never vaped   Substance and Sexual Activity    Alcohol use: No    Drug use: No     Social Determinants of Health     Financial Resource Strain: Low Risk  (2023)    Overall Financial Resource Strain (CARDIA)     Difficulty of Paying Living Expenses: Not hard at all   Transportation Needs: No Transportation Needs (2023)    PRAPARE - Transportation     Lack of Transportation (Medical): No     Lack of Transportation (Non-Medical): No   Housing Stability: Low Risk  (2023)    Housing Stability Vital Sign     Unable to Pay for Housing in the Last Year: No     Number of Places Lived in the Last Year: 1     Unstable Housing in the Last Year: No       Family History:   Family History   Problem Relation Age of Onset    Hypertension Biological Mother     Hypothyroidism Biological Mother     Heart disease Biological Father     Hypertension Biological Father     Lung cancer Neg Hx        Home Medications:  Not in a hospital admission.    Allergies: No Known Allergies      Vitals:   Visit Vitals  /80 (BP Location: Right upper arm, Patient Position: Sitting)   Pulse 76   Resp 20   Ht 1.6 m (5' 3\")   Wt 56.2 kg (124 lb)   LMP  (LMP Unknown)   SpO2 96%   BMI 21.97 kg/m²       Physical Exam  Constitutional: No acute distress. Congested. Alert and pleasant and appears " stated age.   Neurologic: Cranial nerves are intact.   Eyes: Antiicteric, equal and reactive.  Neck: No jugular venous distension. Trachea is midline.  Respiratory: Clear to auscultation bilaterally to the bases with no wheezes, rhonchi, or crackles.   Chest:Robotic Incisions of the right chest well healed.   Cardiovascular: Regular rate and rhythm with No murmurs, gallops, or rubs.   Pulses: No carotid bruits, palpable distal radial pulses and distal lower extremity pulses.   Abdomen: Soft, non tender and non distended with normal bowel sounds.  Musculoskeletal: No clubbing, cyanosis, or edema. No petechiae. Full strength of all four extremities.     Radiology  Independent review of most recent imaging and all relevant previous imaging was compared with the reading of the attending radiologist. Significant findings are as below:     CT chest:  FINDINGS:     LUNG PARENCHYMA AND PLEURA: Previously noted right lower lobe lung mass has been  resected.  Right lower lobe volume loss with postsurgical changes.  Elevation of  the right hemidiaphragm.  New 7 mm medial left lower lobe nodule (image 242 series 3).  New 3 mm left  lower lobe nodule (image 199 series 3).  Recurrent/new neoplasm possible; PET/CT  should be considered.  Mild emphysematous changes.  Scattered mild linear subsegmental atelectasis or  scarring.  No consolidation or acute airspace disease.  Trace right basilar pleural thickening or fluid.     JOHN AND MEDIASTINUM: No adenopathy appreciated on this noncontrast exam.     HEART AND PERICARDIUM: Unremarkable     CHEST WALL: Unremarkable     AXILLA: Unremarkable     BONY ARCHITECTURE: No suspicious lytic or blastic osseous lesions     UPPER ABDOMEN: Unremarkable  IMPRESSION:     1.  Status post resection of right lower lobe lung mass  2.  New 7 mm and 3 mm left lower lobe pulmonary nodules.  Recurrent/new neoplasm  is possible; PET CT should be considered.      Assessment/Plan   62 y/o female found to  have a 2.5 cm lung nodule on a CT chest.  Following a PET scan, she was taken to the operating room on and underwent ion bronch with biopsy and EBUS; Robot assisted thoracoscopic right lower lobectomy with en-bloc middle lobe wedge resection, mediastinal lymph node dissection, lysis of adhesions on 2/15/23.  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  All lymph nodes were negative.    Here today for 6 month surveillance appointment with a CT chest. Overall she is doing well, she is still recovering from a recent URI.     Her CT chest showed a 7mm and 3mm LLL nodule which are both new, given their small size we have recommended that we see her back in 6 months with a CT chest to assess for change.           ALDO Cantu C

## 2023-09-22 NOTE — LETTER
2023     Heike Quispe MD  [address] [facility] [place] [address]    Patient: Narcisa Ramirez  YOB: 1960  Date of Visit: 2023      Dear Dr. Quispe:    Thank you for referring Narcisa Ramirez to me for evaluation. Below are my notes for this consultation.    If you have questions, please do not hesitate to call me. I look forward to following your patient along with you.         Sincerely,        ALDO Cantu        CC: No Recipients    Sayda Olvera PA C  2023  9:38 AM  Sign when Signing Visit  THORACIC SURGERY PROGRESS NOTE      Patient ID: Narcisa Ramirez  : 1960  MRN: [id number]                                            Visit Date: 2023  Encounter Provider: Sayda Olvera    HPI: Narcisa Ramirez is a 63 y.o. female who began experiencing small-volume hemoptysis about 3 months ago.  She eventually saw her primary care physician where chest x-ray showed an abnormality in the right lower lobe followed by CT chest showing a 2.5 cm right lower lobe lung nodule.  Follow-up PET/CT showed been nodule to be significantly FDG avid with a mildly avid right hilar lymph node.  No sites of distant metastatic disease.     She was taken to the OR on 2/15 and underwent ion bronch with biopsy and EBUS; Robot assisted thoracoscopic right lower lobectomy with en-bloc middle lobe wedge resection, mediastinal lymph node dissection, lysis of adhesions.  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  All lymph nodes were negative.     3/17/23: She has already returned to work.  She is doing well overall she has some soreness from the chest tube insertion sites.  She also has some numbness in her right upper abdomen which is not uncommon.  She denies fevers, chills, sweats, cough, shortness of breath.    23: Narcisa presents today for routine 6 month follow-up. She overall has been doing well in the  Is This A New Presentation, Or A Follow-Up?: Acne Additional Comments (Use Complete Sentences): Pt gets occasional cystic lesions. Pt states skin is a sensitive. last 6 months, however around labor day she developed an upper respiratory infection which was treated with Augmentin and steroids. While she is feeling better, she feels it is taking her longer to recover. Prior to the URI she denies any fevers, chills, sweats, cough, hemoptysis, weight loss, headaches, dizziness or chest pain. Since the URI she has had decreased appetite and a slight weight loss.         Medical History:   Past Medical History:   Diagnosis Date    COPD (chronic obstructive pulmonary disease) (CMS/[facility])     COVID-19 vaccine series completed     Depression     Lung nodule        Surgical History:   Past Surgical History:   Procedure Laterality Date    LUNG SURGERY         Social History:   Social History     Socioeconomic History    Marital status:      Spouse name: None    Number of children: None    Years of education: None    Highest education level: None   Tobacco Use    Smoking status: Former     Packs/day: 0.75     Years: 36.00     Additional pack years: 0.00     Total pack years: 27.00     Types: Cigarettes     Start date:      Quit date: 2022     Years since quittin.6    Smokeless tobacco: Never    Tobacco comments:     never vaped   Substance and Sexual Activity    Alcohol use: No    Drug use: No     Social Determinants of Health     Financial Resource Strain: Low Risk  (2023)    Overall Financial Resource Strain (CARDIA)     Difficulty of Paying Living Expenses: Not hard at all   Transportation Needs: No Transportation Needs (2023)    PRAPARE - Transportation     Lack of Transportation (Medical): No     Lack of Transportation (Non-Medical): No   Housing Stability: Low Risk  (2023)    Housing Stability Vital Sign     Unable to Pay for Housing in the Last Year: No     Number of Places Lived in the Last Year: 1     Unstable Housing in the Last Year: No       Family History:   Family History   Problem Relation Age of Onset    Hypertension  "Biological Mother     Hypothyroidism Biological Mother     Heart disease Biological Father     Hypertension Biological Father     Lung cancer Neg Hx        Home Medications:  Not in a hospital admission.    Allergies: No Known Allergies      Vitals:   Visit Vitals  /80 (BP Location: Right upper arm, Patient Position: Sitting)   Pulse 76   Resp 20   Ht 1.6 m (5' 3\")   Wt 56.2 kg (124 lb)   LMP  (LMP Unknown)   SpO2 96%   BMI 21.97 kg/m²       Physical Exam  Constitutional: No acute distress. Congested. Alert and pleasant and appears stated age.   Neurologic: Cranial nerves are intact.   Eyes: Antiicteric, equal and reactive.  Neck: No jugular venous distension. Trachea is midline.  Respiratory: Clear to auscultation bilaterally to the bases with no wheezes, rhonchi, or crackles.   Chest:Robotic Incisions of the right chest well healed.   Cardiovascular: Regular rate and rhythm with No murmurs, gallops, or rubs.   Pulses: No carotid bruits, palpable distal radial pulses and distal lower extremity pulses.   Abdomen: Soft, non tender and non distended with normal bowel sounds.  Musculoskeletal: No clubbing, cyanosis, or edema. No petechiae. Full strength of all four extremities.     Radiology  Independent review of most recent imaging and all relevant previous imaging was compared with the reading of the attending radiologist. Significant findings are as below:     CT chest:  FINDINGS:     LUNG PARENCHYMA AND PLEURA: Previously noted right lower lobe lung mass has been  resected.  Right lower lobe volume loss with postsurgical changes.  Elevation of  the right hemidiaphragm.  New 7 mm medial left lower lobe nodule (image 242 series 3).  New 3 mm left  lower lobe nodule (image 199 series 3).  Recurrent/new neoplasm possible; PET/CT  should be considered.  Mild emphysematous changes.  Scattered mild linear subsegmental atelectasis or  scarring.  No consolidation or acute airspace disease.  Trace right basilar " pleural thickening or fluid.     JOHN AND MEDIASTINUM: No adenopathy appreciated on this noncontrast exam.     HEART AND PERICARDIUM: Unremarkable     CHEST WALL: Unremarkable     AXILLA: Unremarkable     BONY ARCHITECTURE: No suspicious lytic or blastic osseous lesions     UPPER ABDOMEN: Unremarkable  IMPRESSION:     1.  Status post resection of right lower lobe lung mass  2.  New 7 mm and 3 mm left lower lobe pulmonary nodules.  Recurrent/new neoplasm  is possible; PET CT should be considered.      Assessment/Plan   62 y/o female found to have a 2.5 cm lung nodule on a CT chest.  Following a PET scan, she was taken to the operating room on and underwent ion bronch with biopsy and EBUS; Robot assisted thoracoscopic right lower lobectomy with en-bloc middle lobe wedge resection, mediastinal lymph node dissection, lysis of adhesions on 2/15/23.  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  All lymph nodes were negative.    Here today for 6 month surveillance appointment with a CT chest. Overall she is doing well, she is still recovering from a recent URI.     Her CT chest showed a 7mm and 3mm LLL nodule which are both new, given their small size we have recommended that we see her back in 6 months with a CT chest to assess for change.           ALDO Cantu C

## 2023-10-20 ENCOUNTER — HOSPITAL ENCOUNTER (OUTPATIENT)
Dept: RADIOLOGY | Facility: HOSPITAL | Age: 63
Discharge: HOME | End: 2023-10-20
Attending: FAMILY MEDICINE
Payer: COMMERCIAL

## 2023-10-20 DIAGNOSIS — R92.8 OTHER ABNORMAL AND INCONCLUSIVE FINDINGS ON DIAGNOSTIC IMAGING OF BREAST: ICD-10-CM

## 2023-10-20 PROCEDURE — G0279 TOMOSYNTHESIS, MAMMO: HCPCS

## 2023-10-20 PROCEDURE — 76641 ULTRASOUND BREAST COMPLETE: CPT | Mod: 50

## 2024-03-11 ENCOUNTER — HOSPITAL ENCOUNTER (OUTPATIENT)
Dept: RADIOLOGY | Age: 64
Discharge: HOME | End: 2024-03-11
Attending: PHYSICIAN ASSISTANT
Payer: COMMERCIAL

## 2024-03-11 DIAGNOSIS — C34.31 MALIGNANT NEOPLASM OF LOWER LOBE OF RIGHT LUNG (CMS/HCC): ICD-10-CM

## 2024-03-11 PROCEDURE — 71250 CT THORAX DX C-: CPT

## 2024-04-05 ENCOUNTER — OFFICE VISIT (OUTPATIENT)
Dept: THORACIC SURGERY | Facility: CLINIC | Age: 64
End: 2024-04-05
Payer: COMMERCIAL

## 2024-04-05 VITALS
BODY MASS INDEX: 22.32 KG/M2 | TEMPERATURE: 98.2 F | HEIGHT: 63 IN | RESPIRATION RATE: 18 BRPM | SYSTOLIC BLOOD PRESSURE: 138 MMHG | WEIGHT: 126 LBS | OXYGEN SATURATION: 97 % | HEART RATE: 97 BPM | DIASTOLIC BLOOD PRESSURE: 80 MMHG

## 2024-04-05 DIAGNOSIS — C34.31 MALIGNANT NEOPLASM OF LOWER LOBE OF RIGHT LUNG (CMS/HCC): Primary | ICD-10-CM

## 2024-04-05 PROCEDURE — 99212 OFFICE O/P EST SF 10 MIN: CPT | Performed by: SURGERY

## 2024-04-05 NOTE — PROGRESS NOTES
THORACIC SURGERY PROGRESS NOTE      Patient ID: Narcisa Ramirez  : 1960  MRN: [id number]                                            Visit Date: 2024  Encounter Provider: Manoj Cameron  Referring Provider: No ref. provider found    HPI: Narcisa Ramirez is a 63 y.o. female  who began experiencing small-volume hemoptysis about 3 months ago.  She eventually saw her primary care physician where chest x-ray showed an abnormality in the right lower lobe followed by CT chest showing a 2.5 cm right lower lobe lung nodule.  Follow-up PET/CT showed been nodule to be significantly FDG avid with a mildly avid right hilar lymph node.  No sites of distant metastatic disease.     2/15/23-Ion bronch with biopsy and EBUS; Robot assisted thoracoscopic right lower lobectomy with en-bloc middle lobe wedge resection, mediastinal lymph node dissection, lysis of adhesions.  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  All lymph nodes were negative.     3/17/23: She has already returned to work.  She is doing well overall she has some soreness from the chest tube insertion sites.  She also has some numbness in her right upper abdomen which is not uncommon.  She denies fevers, chills, sweats, cough, shortness of breath.     23: Narcisa presents today for routine 6 month follow-up. She overall has been doing well in the last 6 months, however around labor day she developed an upper respiratory infection which was treated with Augmentin and steroids. While she is feeling better, she feels it is taking her longer to recover. Prior to the URI she denies any fevers, chills, sweats, cough, hemoptysis, weight loss, headaches, dizziness or chest pain. Since the URI she has had decreased appetite and a slight weight loss.     24-here today for her 1 year follow-up visit.  Overall she is doing very well.  Has no complaints.  Breathing is back to normal no  coughing or wheezing.  No chest wall pain numbness or tingling.  Anxious to hear about her CAT scan results.         Medical History:   Past Medical History:   Diagnosis Date   • COPD (chronic obstructive pulmonary disease) (CMS/HCC)    • COVID-19 vaccine series completed    • Depression    • Lung nodule        Surgical History:   Past Surgical History:   Procedure Laterality Date   • LUNG SURGERY         Social History:   Social History     Socioeconomic History   • Marital status:      Spouse name: None   • Number of children: None   • Years of education: None   • Highest education level: None   Tobacco Use   • Smoking status: Former     Packs/day: 0.75     Years: 36.00     Additional pack years: 0.00     Total pack years: 27.00     Types: Cigarettes     Start date:      Quit date: 2022     Years since quittin.1   • Smokeless tobacco: Never   • Tobacco comments:     never vaped   Substance and Sexual Activity   • Alcohol use: No   • Drug use: No     Social Determinants of Health     Financial Resource Strain: Low Risk  (2023)    Overall Financial Resource Strain (CARDIA)    • Difficulty of Paying Living Expenses: Not hard at all   Transportation Needs: No Transportation Needs (2023)    PRAPARE - Transportation    • Lack of Transportation (Medical): No    • Lack of Transportation (Non-Medical): No   Housing Stability: Low Risk  (2023)    Housing Stability Vital Sign    • Unable to Pay for Housing in the Last Year: No    • Number of Places Lived in the Last Year: 1    • Unstable Housing in the Last Year: No       Family History:   Family History   Problem Relation Age of Onset   • Hypertension Biological Mother    • Hypothyroidism Biological Mother    • Heart disease Biological Father    • Hypertension Biological Father    • Lung cancer Neg Hx        Home Medications:  Not in a hospital admission.    Allergies: No Known Allergies      Vitals:   Visit Vitals  /80 (BP Location:  "Right upper arm, Patient Position: Sitting)   Pulse 97   Temp 36.8 °C (98.2 °F) (Temporal)   Resp 18   Ht 1.6 m (5' 3\")   Wt 57.2 kg (126 lb)   LMP  (LMP Unknown)   SpO2 97%   BMI 22.32 kg/m²       Physical Exam  Constitutional: No acute distress. Alert and pleasant and appears stated age.   Respiratory: Clear to auscultation bilaterally to the bases with no wheezes, rhonchi, or crackles.   Cardiovascular: Regular rate and rhythm with No murmurs, gallops, or rubs.   Chest: Well-healed right thoracoscopic incisions  Musculoskeletal: No clubbing, cyanosis, or edema. No petechiae. Full strength of all four extremities.     Radiology  Independent review of most recent imaging and all relevant previous imaging was compared with the reading of the attending radiologist. Significant findings are as below:     Malignant neoplasm of the lower lobe of the right lung     COMMENT: Unenhanced CT of the chest is performed.  Intravenous contrast was not  administered.     COMPARISON STUDY: Chest CT dated 9/18/2023.     FINDINGS:     Lung fields: Stable postsurgical changes of presumed right lower lobectomy.  No  concerning right pulmonary nodularity or mass density.  Previously noted 7 mm  somewhat linearly oriented medial left lower lobe nodule is no longer present.  Additionally, previously noted 3 mm left lower lobe nodule is not apparent.  No  concerning nodularity     Mediastinum and ruth: Unremarkable noting limitations of unenhanced imaging     Heart and pericardium: The heart is normal in size.  No pericardial effusion.     Chest wall pleura: Unremarkable, no pleural effusion.     Axilla: No axillary adenopathy     Upper abdomen: Limited visualization of the upper abdominal structures is  grossly unremarkable.     Osseous structures: No suspicious osseous lesions appreciated.     --  IMPRESSION: Stable postsurgical changes right lung.  No findings suggestive of  recurrent malignancy.    Pathology    A.  Right lower lobe, " biopsy:  Large cell carcinoma.     B.  Lung, right lower lobe, lobectomy:  Large cell carcinoma, 3.8 cm.  No lymphovascular invasion identified.  No visceral pleural invasion identified.  No tumor seen in two lymph nodes (0/2).  Negative bronchial and vascular margins.  pT2N0     C.  Station 7 lymph node:  No tumor seen in two lymph nodes (0/2).     D.  Station 10R lymph node:  No tumor seen in one lymph node (0/1).     E.  Station 11R lymph node:  No tumor seen in three lymph nodes (0/3).     F.  Station 4R lymph node:  No tumor seen in eight lymph nodes (0/8).      Assessment/Plan   62 y/o female found to have a 2.5 cm lung nodule on a CT chest.  Following a PET scan, she was taken to the operating room on and underwent ion bronch with biopsy and EBUS; Robot assisted thoracoscopic right lower lobectomy with en-bloc middle lobe wedge resection, mediastinal lymph node dissection, lysis of adhesions on 2/15/23.  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  All lymph nodes were negative.     Here today for 12-month follow-up visit.  Overall she is doing very well.  Back to her normal baseline function and breathing.  12-month CT scan is excellent no evidence of recurrent disease.  Previously seen subcentimeter nodules on her 6-month scan have resolved.  I will see her back in 6 months with another CT scan.             Manoj Cameron MD

## 2024-10-01 ENCOUNTER — HOSPITAL ENCOUNTER (OUTPATIENT)
Dept: RADIOLOGY | Age: 64
Discharge: HOME | End: 2024-10-01
Attending: SURGERY
Payer: COMMERCIAL

## 2024-10-01 DIAGNOSIS — C34.31 MALIGNANT NEOPLASM OF LOWER LOBE OF RIGHT LUNG (CMS/HCC): ICD-10-CM

## 2024-10-01 PROCEDURE — 71250 CT THORAX DX C-: CPT

## 2024-10-04 ENCOUNTER — TELEMEDICINE (OUTPATIENT)
Dept: THORACIC SURGERY | Facility: CLINIC | Age: 64
End: 2024-10-04
Payer: COMMERCIAL

## 2024-10-04 DIAGNOSIS — C34.31 MALIGNANT NEOPLASM OF LOWER LOBE OF RIGHT LUNG (CMS/HCC): Primary | ICD-10-CM

## 2024-10-04 PROCEDURE — 99441 PR PHYS/QHP TELEPHONE EVALUATION 5-10 MIN: CPT | Mod: 95 | Performed by: SURGERY

## 2024-10-04 NOTE — PROGRESS NOTES
Verification of Patient Location:  The patient affirms they are currently located in the following state:[place]     Are you in your home or a private residence? Yes    Audio Only Telemedicine Visit    Request for Consent:  You and I are about to have a telemedicine check-in or visit. This is allowed because you are already my patient, and you have requested it.  This telemedicine visit will be billed to your health insurance or you, if you are self-insured.  You understand you will be responsible for any copayments or coinsurances that apply to your telemedicine visit.  Before starting our telemedicine visit, I am required to get your consent for this virtual check-in or visit by telemedicine. Do you consent?      Patient Response to Request for Consent: Yes    The following have been reviewed and updated as appropriate in this visit:          Visit Documentation:  Patient requested a telemedicine video visit today because she has been dealing with depression issues recently and her psychiatrist put her on a new antidepressant medication and he does not want her driving.  She is 18 months status post right lower lobectomy for a stage Ib non-small cell lung cancer and on the 18-month follow-up CT scan she was found to have a new left upper lobe lesion looks more like mucous plugging/inflammation, however because it is new we will order a PET/CT in 3 months to assess for PET activity as well as possible resolution.  This plan was discussed with her and she agrees with the plan moving forward.  Will help her get approval and schedule the PET scan and schedule another visit in 3 months afterwards to discuss the results.      Time Spent:  I spent 8 minutes on this date of service performing the following activities: obtaining history, entering orders, documenting, preparing for visit, obtaining / reviewing records, independently reviewing study/studies, communicating results, and coordinating care.

## 2025-01-14 ENCOUNTER — HOSPITAL ENCOUNTER (OUTPATIENT)
Dept: RADIOLOGY | Facility: CLINIC | Age: 65
Discharge: HOME | End: 2025-01-14
Attending: SURGERY
Payer: COMMERCIAL

## 2025-01-14 VITALS — HEIGHT: 62 IN | WEIGHT: 115 LBS | BODY MASS INDEX: 21.16 KG/M2

## 2025-01-14 DIAGNOSIS — C34.31 MALIGNANT NEOPLASM OF LOWER LOBE OF RIGHT LUNG (CMS/HCC): ICD-10-CM

## 2025-01-14 RX ORDER — FLUDEOXYGLUCOSE F18 300 MCI/ML
8.96 INJECTION INTRAVENOUS ONCE
Status: COMPLETED | OUTPATIENT
Start: 2025-01-14 | End: 2025-01-14

## 2025-01-14 RX ADMIN — FLUDEOXYGLUCOSE F18 8.96 MILLICURIE: 300 INJECTION INTRAVENOUS at 15:28

## 2025-01-17 ENCOUNTER — TELEMEDICINE (OUTPATIENT)
Dept: THORACIC SURGERY | Facility: CLINIC | Age: 65
End: 2025-01-17
Payer: COMMERCIAL

## 2025-01-17 DIAGNOSIS — C34.31 MALIGNANT NEOPLASM OF LOWER LOBE OF RIGHT LUNG (CMS/HCC): Primary | ICD-10-CM

## 2025-01-17 PROCEDURE — 99211 OFF/OP EST MAY X REQ PHY/QHP: CPT | Mod: 95 | Performed by: SURGERY

## 2025-01-17 NOTE — PROGRESS NOTES
Verification of Patient Location:  The patient affirms they are currently located in the following state:[place]     Are you in your home or a private residence? Yes    Telemedicine Visit with video through EPIC HIPPA compliant software     Request for Consent:  You and I are about to have a telemedicine check-in or visit. This is allowed because you are already my patient, and you have requested it.  This telemedicine visit will be billed to your health insurance or you, if you are self-insured.  You understand you will be responsible for any copayments or coinsurances that apply to your telemedicine visit.  Before starting our telemedicine visit, I am required to get your consent for this virtual check-in or visit by telemedicine. Do you consent?      Patient Response to Request for Consent: Yes    The following have been reviewed and updated as appropriate in this visit:   Tobacco  Allergies  Meds  Problems  Med Hx  Surg Hx  Fam Hx         Visit Documentation:  Patient requested a telemedicine video visit today because she is having some difficulties with her car needing a new battery and cannot drive today.    We had a telemedicine video visit 3 months ago when she was 18 months status post right lower lobectomy for a stage Ib non-small cell lung cancer and on the 18-month follow-up CT scan she was found to have a new left upper lobe lesion looks more like mucous plugging/inflammation, however because it is new we ordered a PET/CT in 3 months to assess for PET activity as well as possible resolution.     The 3-month PET/CT showed the left upper lobe nodule to have reduced in size with very minimal activity consistent with an infectious/inflammatory etiology.  There was also a finding in the right lower lung zone of a consolidative opacity with very mild PET activity.  I have reviewed the CT images and this lesion does not appear like a mass or nodule but more like a consolidative inflammatory  lesion also consistent with an infectious/inflammatory etiology.    We will proceed with a regular CT chest in 3 months to reevaluate and most likely go back to scans every 6 months for surveillance.  She understands and agrees with this plan.

## 2025-04-10 ENCOUNTER — HOSPITAL ENCOUNTER (OUTPATIENT)
Dept: RADIOLOGY | Age: 65
Discharge: HOME | End: 2025-04-10
Attending: SURGERY
Payer: COMMERCIAL

## 2025-04-10 DIAGNOSIS — C34.31 MALIGNANT NEOPLASM OF LOWER LOBE OF RIGHT LUNG (CMS/HCC): ICD-10-CM

## 2025-04-10 PROCEDURE — 71250 CT THORAX DX C-: CPT

## 2025-04-18 ENCOUNTER — OFFICE VISIT (OUTPATIENT)
Dept: THORACIC SURGERY | Facility: CLINIC | Age: 65
End: 2025-04-18
Payer: COMMERCIAL

## 2025-04-18 ENCOUNTER — PREP FOR CASE (OUTPATIENT)
Dept: THORACIC SURGERY | Facility: CLINIC | Age: 65
End: 2025-04-18

## 2025-04-18 VITALS
HEIGHT: 62 IN | TEMPERATURE: 97.5 F | WEIGHT: 121.2 LBS | HEART RATE: 83 BPM | DIASTOLIC BLOOD PRESSURE: 81 MMHG | BODY MASS INDEX: 22.31 KG/M2 | RESPIRATION RATE: 18 BRPM | OXYGEN SATURATION: 100 % | SYSTOLIC BLOOD PRESSURE: 156 MMHG

## 2025-04-18 DIAGNOSIS — C34.31 MALIGNANT NEOPLASM OF LOWER LOBE OF RIGHT LUNG (CMS/HCC): Primary | ICD-10-CM

## 2025-04-18 PROCEDURE — 99214 OFFICE O/P EST MOD 30 MIN: CPT | Performed by: SURGERY

## 2025-04-18 PROCEDURE — 3008F BODY MASS INDEX DOCD: CPT | Performed by: SURGERY

## 2025-04-18 RX ORDER — FLUTICASONE FUROATE, UMECLIDINIUM BROMIDE AND VILANTEROL TRIFENATATE 100; 62.5; 25 UG/1; UG/1; UG/1
1 POWDER RESPIRATORY (INHALATION) DAILY
COMMUNITY

## 2025-04-18 RX ORDER — SERTRALINE HYDROCHLORIDE 50 MG/1
50 TABLET, FILM COATED ORAL DAILY
COMMUNITY
Start: 2025-03-10

## 2025-04-18 RX ORDER — ACETAMINOPHEN 500 MG
2000 TABLET ORAL DAILY
COMMUNITY

## 2025-04-18 RX ORDER — CLONAZEPAM 1 MG/1
TABLET ORAL
COMMUNITY

## 2025-04-18 RX ORDER — BUPROPION HYDROCHLORIDE 150 MG/1
150 TABLET ORAL DAILY
COMMUNITY
Start: 2024-09-17

## 2025-04-18 RX ORDER — BUPROPION HYDROCHLORIDE 300 MG/1
300 TABLET ORAL DAILY
COMMUNITY

## 2025-04-18 RX ORDER — AMLODIPINE BESYLATE 5 MG/1
5 TABLET ORAL DAILY
COMMUNITY

## 2025-04-18 ASSESSMENT — PAIN SCALES - GENERAL: PAINLEVEL_OUTOF10: 0-NO PAIN

## 2025-04-18 NOTE — PROGRESS NOTES
THORACIC SURGERY PROGRESS NOTE      Patient ID: Narcisa Ramirez  : 1960  MRN: [id number]                                            Visit Date: 2025  Encounter Provider: Manoj Cameron  Referring Provider: No ref. provider found    HPI:  22-  Narcisa Ramirze is a 64 y.o. female current active 25-pack-year smoker who began experiencing small-volume hemoptysis about 3 months ago.  She eventually saw her primary care physician where chest x-ray showed an abnormality in the right lower lobe followed by CT chest showing a 2.5 cm right lower lobe lung nodule.  Follow-up PET/CT showed been nodule to be significantly FDG avid with a mildly avid right hilar lymph node.  No sites of distant metastatic disease.     She had hemoptysis for about 3 months which stopped 2 weeks ago.  She denies fever chills sweats or weight loss.  Denies any chest pain.  Denies cough or wheeze.  She gets short of breath with 3-4 flights of stairs but not on flat surfaces.     She saw cardiologist previously for mitral valve prolapse.     She had a previous VATS right lower lobe wedge resection in  for a different nodule that was benign.    2/15/23- Ion Navigational Bronchoscopy with radial probe and biopsy, EBUS , robot assisted thoracoscopic right lower lobectomy with en-bloc middle lobe wedge resection, mediastinal lymph node dissection, lysis of adhesions    25-here today for her scheduled 6-month follow-up visit.  Overall she is doing well with no pulmonary complaints.  Once to twice a month she wakes up with some thick mucus that she has to cough out occasionally have a hard time expectorating but eventually clears up by the afternoon.  Otherwise no shortness of breath or wheezing.  Has been dealing with some depression and anxiety issues since she retired 2 years ago but is seeing a psychiatrist and that is well-controlled.        Medical History:   Past Medical History:   Diagnosis Date    COPD (chronic  obstructive pulmonary disease) (CMS/HCC)     COVID-19 vaccine series completed     Depression     Lung nodule        Surgical History:   Past Surgical History   Procedure Laterality Date    Ion Navigational Bronchoscopy, EBUS, Robot assisted thoracoscopic right lower lobectomy with en-bloc middle lobe wedge resection, mediastinal lymph node dissection, lysis of adhesions Right 2/15/2023    Performed by Manoj Cameron MD at [facility] [place]    Lung surgery         Social History:   Social History     Socioeconomic History    Marital status:      Spouse name: None    Number of children: None    Years of education: None    Highest education level: None   Tobacco Use    Smoking status: Former     Current packs/day: 0.00     Average packs/day: 0.8 packs/day for 36.0 years (27.0 ttl pk-yrs)     Types: Cigarettes     Start date: 1988     Quit date: 2/6/2022     Years since quitting: 3.1    Smokeless tobacco: Never    Tobacco comments:     never vaped   Substance and Sexual Activity    Alcohol use: No    Drug use: No     Social Drivers of Health     Financial Resource Strain: Low Risk  (2/16/2023)    Overall Financial Resource Strain (CARDIA)     Difficulty of Paying Living Expenses: Not hard at all   Transportation Needs: No Transportation Needs (2/16/2023)    PRAPARE - Transportation     Lack of Transportation (Medical): No     Lack of Transportation (Non-Medical): No   Housing Stability: Low Risk  (2/16/2023)    Housing Stability Vital Sign     Unable to Pay for Housing in the Last Year: No     Number of Places Lived in the Last Year: 1     Unstable Housing in the Last Year: No       Family History:   Family History   Problem Relation Name Age of Onset    Hypertension Biological Mother      Hypothyroidism Biological Mother      Heart disease Biological Father      Hypertension Biological Father      Lung cancer Neg Hx         Home Medications:      Allergies: No Known Allergies      Vitals: Visit Vitals  BP (!) 156/81 (BP  "Location: Right upper arm, Patient Position: Sitting)   Pulse 83   Temp 36.4 °C (97.5 °F) (Temporal)   Resp 18   Ht 1.575 m (5' 2\")   Wt 55 kg (121 lb 3.2 oz)   LMP  (LMP Unknown)   SpO2 100%   BMI 22.17 kg/m²       Physical Exam  Constitutional: No acute distress. Alert and pleasant and appears stated age.   Neurologic: Cranial nerves are intact.   Eyes: Antiicteric, equal and reactive.  Neck: No jugular venous distension. Trachea is midline. Thyroid is without nodularity or involvement. Normal movement with swallowing. No cervical adenopathy.   Respiratory: Clear to auscultation bilaterally to the bases with no wheezes, rhonchi, or crackles.   Cardiovascular: Regular rate and rhythm with No murmurs, gallops, or rubs.   Pulses: No carotid bruits, palpable distal radial pulses and distal lower extremity pulses.   Abdomen: Soft, non tender and non distended with normal bowel sounds and no hepatosplenomegaly. No hernias.   Musculoskeletal: No clubbing, cyanosis, or edema. No petechiae. Full strength of all four extremities.     Radiology  Independent review of most recent imaging and all relevant previous imaging was compared with the reading of the attending radiologist. Significant findings are as below:     Narrative & Impression   CLINICAL HISTORY:  Lung cancer status post right lower lobectomy, pulmonary  nodule     COMPARISON:    CT from October 1, 2024 and PET/CT from January 14, 2025     COMMENT:  Technique: CT of the Chest was performed without IV contrast.  KV and/or mA were  adjusted according to the patient's size and body part for CT dose reduction.     CHEST:     Lungs: The patient is status post right lower lobectomy there is right middle  lobe collapse which is new since the prior study from October 1, 2024 but was  present on the prior study from January 14, 2025. There are likely secretions  present within the bronchus to the right middle lobe. There is a 4 x 4  millimeter left upper lobe pulmonary " nodule seen on image 93 of series 3. When  measured in a similar fashion on the prior CT from October 1, 2024 this measured  7 x 7 mm. When measured on the prior study from January 14, 2025 this measured  approximately 6 x 5 mm.     There is mild upper lobe pulmonary emphysema. This is centrilobular  distribution. There is scarring present within the lingula and/or atelectasis.  There is atelectasis present at the right lung base as well as within the left  lower lobe. There is a 2 mm right upper lobe pulmonary nodule on image 181 of  series 3 that was not differently seen on the prior study from October 1, 2024  or January 14, 2025. There is a 4 mm right upper lobe pulmonary nodule on image  152 of series 3. This is new since the prior study  Airways: Trachea is patent  Pleura: within normal limits.  Lower Neck: within normal limits.  Axilla: No enlarged nodes.  Mediastinum and Danica: No enlarged nodes.  Esophagus: Normal caliber.  Heart and Pericardium: Normal size.  No pericardial effusion.  Vessels:  There is atherosclerotic disease of the thoracic aorta.  Chest Wall: Within normal limits.  Upper Abdomen:  Within normal limits.  Bones: There is mild multilevel thoracic spondylosis. There is mild  dextrocurvature of the thoracic spine.     --  IMPRESSION:        1. There has been continued interval decrease in size of a left upper lobe  pulmonary nodule which currently measures 4 x 4 mm which would argue for benign  etiology but for which continued imaging surveillance is suggested.  2. There are 2 new right upper lobe pulmonary nodules measuring 2 mm and 4 mm. A  follow-up CT of the chest without intravenous contrast in 3 months is suggested.  3. Persistent right middle lobe collapse. This is not significantly changed from  January 14, 2025 but is new since October 1, 2024. There appear to be secretions  present within the bronchus to the right middle lobe. The patient is status post  right lower lobectomy.  4.  See comments for additional findings       Pathology    A.  Right lower lobe, biopsy:  Large cell carcinoma.     B.  Lung, right lower lobe, lobectomy:  Large cell carcinoma, 3.8 cm.  No lymphovascular invasion identified.  No visceral pleural invasion identified.  No tumor seen in two lymph nodes (0/2).  Negative bronchial and vascular margins.  pT2N0     C.  Station 7 lymph node:  No tumor seen in two lymph nodes (0/2).     D.  Station 10R lymph node:  No tumor seen in one lymph node (0/1).     E.  Station 11R lymph node:  No tumor seen in three lymph nodes (0/3).     F.  Station 4R lymph node:  No tumor seen in eight lymph nodes (0/8).         Assessment/Plan   64-year-old female former smoker with right lower lobe large cell carcinoma status post right lower lobectomy with mediastinal lymph node dissection 2 years ago.  Surveillance CT scan shows a resolving left upper lobe nodule likely infectious/inflammatory and 2 new tiny 5 mm right upper lobe nodules that do not appear suspicious.  We will continue with scans every 6 months for surveillance.    Additionally, on her CT scan there is right middle lobe collapse with evidence of mucous/debris in the middle lobe bronchus.  Given her symptoms of occasional coughing with difficulty expectorating her mucus, we will schedule her for flexible bronchoscopy for pulmonary toilet and to evaluate the airway and clear any secretions/mucus.             Manoj Cameron MD

## 2025-04-22 ENCOUNTER — APPOINTMENT (OUTPATIENT)
Dept: LAB | Facility: HOSPITAL | Age: 65
End: 2025-04-22
Attending: SURGERY
Payer: COMMERCIAL

## 2025-04-22 DIAGNOSIS — C34.31 MALIGNANT NEOPLASM OF LOWER LOBE OF RIGHT LUNG (CMS/HCC): ICD-10-CM

## 2025-04-22 DIAGNOSIS — C34.31 MALIGNANT NEOPLASM OF LOWER LOBE OF RIGHT LUNG (CMS/HCC): Primary | ICD-10-CM

## 2025-04-22 LAB
ABO + RH BLD: NORMAL
ANION GAP SERPL CALC-SCNC: 8 MEQ/L (ref 3–15)
APTT PPP: 28 SEC (ref 23–35)
BASOPHILS # BLD: 0.1 K/UL (ref 0.01–0.1)
BASOPHILS NFR BLD: 1.1 %
BLD GP AB SCN SERPL QL: NEGATIVE
BLOOD BANK CMNT PATIENT-IMP: NORMAL
BUN SERPL-MCNC: 20 MG/DL (ref 7–25)
CALCIUM SERPL-MCNC: 9.7 MG/DL (ref 8.6–10.3)
CHLORIDE SERPL-SCNC: 106 MEQ/L (ref 98–107)
CO2 SERPL-SCNC: 25 MEQ/L (ref 21–31)
CREAT SERPL-MCNC: 1.1 MG/DL (ref 0.6–1.2)
D AG BLD QL: POSITIVE
DIFFERENTIAL METHOD BLD: ABNORMAL
EGFRCR SERPLBLD CKD-EPI 2021: 55.5 ML/MIN/1.73M*2
EOSINOPHIL # BLD: 0.21 K/UL (ref 0.04–0.36)
EOSINOPHIL NFR BLD: 2.2 %
ERYTHROCYTE [DISTWIDTH] IN BLOOD BY AUTOMATED COUNT: 12.8 % (ref 11.7–14.4)
GLUCOSE SERPL-MCNC: 96 MG/DL (ref 70–99)
HCT VFR BLD AUTO: 37.9 % (ref 35–45)
HGB BLD-MCNC: 12.2 G/DL (ref 11.8–15.7)
IMM GRANULOCYTES # BLD AUTO: 0.03 K/UL (ref 0–0.08)
IMM GRANULOCYTES NFR BLD AUTO: 0.3 %
INR PPP: 1
LABORATORY COMMENT REPORT: NORMAL
LYMPHOCYTES # BLD: 2.6 K/UL (ref 1.2–3.5)
LYMPHOCYTES NFR BLD: 27.5 %
MCH RBC QN AUTO: 30 PG (ref 28–33.2)
MCHC RBC AUTO-ENTMCNC: 32.2 G/DL (ref 32.2–35.5)
MCV RBC AUTO: 93.3 FL (ref 83–98)
MONOCYTES # BLD: 0.71 K/UL (ref 0.28–0.8)
MONOCYTES NFR BLD: 7.5 %
NEUTROPHILS # BLD: 5.79 K/UL (ref 1.7–7)
NEUTS SEG NFR BLD: 61.4 %
NRBC BLD-RTO: 0 %
PLATELET # BLD AUTO: 348 K/UL (ref 150–369)
PMV BLD AUTO: 8.8 FL (ref 9.4–12.3)
POTASSIUM SERPL-SCNC: 4.3 MEQ/L (ref 3.5–5.1)
PROTHROMBIN TIME: 13.3 SEC (ref 12.2–14.5)
RBC # BLD AUTO: 4.06 M/UL (ref 3.93–5.22)
SODIUM SERPL-SCNC: 139 MEQ/L (ref 136–145)
SPECIMEN EXP DATE BLD: NORMAL
WBC # BLD AUTO: 9.44 K/UL (ref 3.8–10.5)

## 2025-04-22 PROCEDURE — 86850 RBC ANTIBODY SCREEN: CPT

## 2025-04-22 PROCEDURE — 85610 PROTHROMBIN TIME: CPT

## 2025-04-22 PROCEDURE — 36415 COLL VENOUS BLD VENIPUNCTURE: CPT

## 2025-04-22 PROCEDURE — 80048 BASIC METABOLIC PNL TOTAL CA: CPT

## 2025-04-22 PROCEDURE — 85025 COMPLETE CBC W/AUTO DIFF WBC: CPT

## 2025-04-22 PROCEDURE — 85730 THROMBOPLASTIN TIME PARTIAL: CPT

## 2025-04-24 ENCOUNTER — TELEPHONE (OUTPATIENT)
Dept: SURGERY | Facility: CLINIC | Age: 65
End: 2025-04-24
Payer: COMMERCIAL

## 2025-04-24 NOTE — TELEPHONE ENCOUNTER
I called patient because she did not have the EKG done when she had labs drawn. Narcisa will need this EKG done before her procedure Monday 4/28/25.

## 2025-04-25 ENCOUNTER — PRE-ADMISSION TESTING (OUTPATIENT)
Dept: PREADMISSION TESTING | Age: 65
End: 2025-04-25
Payer: COMMERCIAL

## 2025-04-25 VITALS — HEIGHT: 62 IN | BODY MASS INDEX: 21.16 KG/M2 | WEIGHT: 115 LBS

## 2025-04-25 RX ORDER — ALBUTEROL SULFATE 90 UG/1
2 INHALANT RESPIRATORY (INHALATION) EVERY 6 HOURS PRN
COMMUNITY

## 2025-04-25 RX ORDER — ACETAMINOPHEN 500 MG
500 TABLET ORAL EVERY 6 HOURS PRN
COMMUNITY

## 2025-04-25 ASSESSMENT — PAIN SCALES - GENERAL: PAINLEVEL_OUTOF10: 0-NO PAIN

## 2025-04-25 NOTE — PRE-PROCEDURE INSTRUCTIONS
[facility]  [address] [place] [address]    1.       Admissions will call you with your arrival time on  April 25, 2025 (day prior to surgery) between 2pm-4pm.  For questions about your arrival time, please call [phone].    2.       On the day of your procedure please report to the [facility] in the [place]. Please arrive through the [place] Lobby Entrance.  If you are parking in the [facility], come to the ground floor of the garage and follow signs to the [facility].  After being screened, please report to either the Outpatient Registration for Pre-Admission Testing or to the Surgery Registration Desk.    3. Please follow the following fasting guidelines:     No solid food EIGHT HOURS prior to arrival time on day of surgery.  6 ounces of clear liquids, meaning water is permitted up to TWO HOURS prior to arrival at the hospital.    4. Please take ONLY the following medications with a sip of water on the morning of your procedure:tylenol if needed, albuterol if needed, amlodipine, bupropion, clonazepam, sertraline, trelegy, zyrtec if needed    5. Other Instructions: You may brush your teeth the morning of the procedure. Rinse and spit, do not swallow.  Bring a list of your medications with dosages.  Use surgical wash as directed. Bring Albuterol Inhaler    6. If you develop a cold, cough, fever, rash, or other symptom prior to the day of the procedure, please report it to your physician immediately.    7. If you need to cancel the procedure for any reason, please contact your physician.    8. Make arrangements to have safe transportation home accompanied by a responsible adult. If you have not arranged safe transportation home, your surgery will be cancelled. Safe transportation may include private vehicle, ride-share service, taxi and public transportation when accompanied by a responsible adult who will assist you home. A responsible adult is someone known to  you and does not include the taxi, ride-share or public transit drive transporting you.    9.  If it is medically necessary for you to have a longer stay, you will be informed as soon as the decision is made.    10. Only bring essential items to the hospital.  Do not wear or bring anything of value to the hospital including jewelry of any kind, money, or wallet. Do not wear make-up or contact lenses.  DO NOT BRING MEDICATIONS FROM HOME unless instructed to do so. DO bring your hearing aids, glasses, and a case    11. No lotion, creams, powders, or oils on skin the morning of procedure     12. Dress in comfortable clothes.    13.  If instructed, please bring a copy of your Advanced Directive (Living Will/Durable Power of ) on the day of your procedure.     14. Ensuring your safety at all times is a very important part of our [facility] Culture of Safety. After having surgery and sedation, you are at risk for falling and balance issues. Although you may feel awake, the effects of the medication can last up to 24 hours after anesthesia. If you need to use the bathroom during your recovery period, nursing staff will escort you there and stay with you to ensure your safety.    15. Refrain from drinking alcohol and smoking cigarettes for 24 hours prior to surgery.    16. Shower with antibacterial soap (Dial) the night before and morning of your procedure.  If your procedure indicates the need for CHG antiseptic wash (Bactoshield or Hibiclens), please use this instead and follow instructions as discussed at the time of your Pre-Admission Testing phone interview or visit.    Above instructions reviewed with patient and patient acknowledges understanding.    Form explained by: Frannie Rocha RN

## 2025-04-28 ENCOUNTER — HOSPITAL ENCOUNTER (OUTPATIENT)
Facility: HOSPITAL | Age: 65
Setting detail: HOSPITAL OUTPATIENT SURGERY
Discharge: HOME | End: 2025-04-28
Attending: SURGERY | Admitting: SURGERY
Payer: COMMERCIAL

## 2025-04-28 ENCOUNTER — ANESTHESIA (OUTPATIENT)
Dept: OPERATING ROOM | Facility: HOSPITAL | Age: 65
Setting detail: HOSPITAL OUTPATIENT SURGERY
End: 2025-04-28
Payer: COMMERCIAL

## 2025-04-28 VITALS
RESPIRATION RATE: 17 BRPM | OXYGEN SATURATION: 94 % | TEMPERATURE: 97.6 F | DIASTOLIC BLOOD PRESSURE: 50 MMHG | HEART RATE: 66 BPM | SYSTOLIC BLOOD PRESSURE: 101 MMHG

## 2025-04-28 DIAGNOSIS — C34.31 MALIGNANT NEOPLASM OF LOWER LOBE OF RIGHT LUNG (CMS/HCC): ICD-10-CM

## 2025-04-28 LAB
GRAM STN SPEC: NORMAL

## 2025-04-28 PROCEDURE — 63600000 HC DRUGS/DETAIL CODE: Mod: JZ | Performed by: NURSE ANESTHETIST, CERTIFIED REGISTERED

## 2025-04-28 PROCEDURE — 37000002 HC ANESTHESIA MAC: Performed by: SURGERY

## 2025-04-28 PROCEDURE — 87116 MYCOBACTERIA CULTURE: CPT | Performed by: SURGERY

## 2025-04-28 PROCEDURE — 0BC58ZZ EXTIRPATION OF MATTER FROM RIGHT MIDDLE LOBE BRONCHUS, VIA NATURAL OR ARTIFICIAL OPENING ENDOSCOPIC: ICD-10-PCS | Performed by: SURGERY

## 2025-04-28 PROCEDURE — 87102 FUNGUS ISOLATION CULTURE: CPT | Performed by: SURGERY

## 2025-04-28 PROCEDURE — 87070 CULTURE OTHR SPECIMN AEROBIC: CPT | Performed by: SURGERY

## 2025-04-28 PROCEDURE — 71000001 HC PACU PHASE 1 INITIAL 30MIN: Performed by: SURGERY

## 2025-04-28 PROCEDURE — 0B9D8ZX DRAINAGE OF RIGHT MIDDLE LUNG LOBE, VIA NATURAL OR ARTIFICIAL OPENING ENDOSCOPIC, DIAGNOSTIC: ICD-10-PCS | Performed by: SURGERY

## 2025-04-28 PROCEDURE — 36000012 HC OR LEVEL 2 EA ADDL MIN: Performed by: SURGERY

## 2025-04-28 PROCEDURE — 87206 SMEAR FLUORESCENT/ACID STAI: CPT | Performed by: SURGERY

## 2025-04-28 PROCEDURE — 36000002 HC OR LEVEL 2 INITIAL 30MIN: Performed by: SURGERY

## 2025-04-28 PROCEDURE — 31645 BRNCHSC W/THER ASPIR 1ST: CPT | Performed by: SURGERY

## 2025-04-28 PROCEDURE — 25000000 HC PHARMACY GENERAL: Performed by: NURSE ANESTHETIST, CERTIFIED REGISTERED

## 2025-04-28 PROCEDURE — 87205 SMEAR GRAM STAIN: CPT | Performed by: SURGERY

## 2025-04-28 PROCEDURE — 71000002 HC PACU PHASE 2 INITIAL 30MIN: Performed by: SURGERY

## 2025-04-28 PROCEDURE — 31624 DX BRONCHOSCOPE/LAVAGE: CPT | Mod: RT | Performed by: SURGERY

## 2025-04-28 PROCEDURE — 25800000 HC PHARMACY IV SOLUTIONS: Performed by: NURSE ANESTHETIST, CERTIFIED REGISTERED

## 2025-04-28 RX ORDER — SODIUM CHLORIDE 9 MG/ML
INJECTION, SOLUTION INTRAVENOUS CONTINUOUS PRN
Status: DISCONTINUED | OUTPATIENT
Start: 2025-04-28 | End: 2025-04-28 | Stop reason: SURG

## 2025-04-28 RX ORDER — ACETAMINOPHEN 325 MG/1
650 TABLET ORAL ONCE AS NEEDED
Status: DISCONTINUED | OUTPATIENT
Start: 2025-04-28 | End: 2025-04-28 | Stop reason: HOSPADM

## 2025-04-28 RX ORDER — ONDANSETRON HYDROCHLORIDE 2 MG/ML
INJECTION, SOLUTION INTRAVENOUS AS NEEDED
Status: DISCONTINUED | OUTPATIENT
Start: 2025-04-28 | End: 2025-04-28 | Stop reason: SURG

## 2025-04-28 RX ORDER — MIDAZOLAM HYDROCHLORIDE 2 MG/2ML
INJECTION, SOLUTION INTRAMUSCULAR; INTRAVENOUS AS NEEDED
Status: DISCONTINUED | OUTPATIENT
Start: 2025-04-28 | End: 2025-04-28 | Stop reason: SURG

## 2025-04-28 RX ORDER — PROPOFOL 200MG/20ML
SYRINGE (ML) INTRAVENOUS AS NEEDED
Status: DISCONTINUED | OUTPATIENT
Start: 2025-04-28 | End: 2025-04-28 | Stop reason: SURG

## 2025-04-28 RX ORDER — ONDANSETRON HYDROCHLORIDE 2 MG/ML
4 INJECTION, SOLUTION INTRAVENOUS
Status: DISCONTINUED | OUTPATIENT
Start: 2025-04-28 | End: 2025-04-28 | Stop reason: HOSPADM

## 2025-04-28 RX ORDER — IBUPROFEN 200 MG
16-32 TABLET ORAL AS NEEDED
Status: DISCONTINUED | OUTPATIENT
Start: 2025-04-28 | End: 2025-04-28 | Stop reason: HOSPADM

## 2025-04-28 RX ORDER — FENTANYL CITRATE 50 UG/ML
INJECTION, SOLUTION INTRAMUSCULAR; INTRAVENOUS AS NEEDED
Status: DISCONTINUED | OUTPATIENT
Start: 2025-04-28 | End: 2025-04-28 | Stop reason: SURG

## 2025-04-28 RX ORDER — LIDOCAINE HYDROCHLORIDE 10 MG/ML
INJECTION, SOLUTION EPIDURAL; INFILTRATION; INTRACAUDAL; PERINEURAL AS NEEDED
Status: DISCONTINUED | OUTPATIENT
Start: 2025-04-28 | End: 2025-04-28 | Stop reason: SURG

## 2025-04-28 RX ORDER — FENTANYL CITRATE 50 UG/ML
50 INJECTION, SOLUTION INTRAMUSCULAR; INTRAVENOUS EVERY 5 MIN PRN
Status: DISCONTINUED | OUTPATIENT
Start: 2025-04-28 | End: 2025-04-28 | Stop reason: HOSPADM

## 2025-04-28 RX ORDER — GLYCOPYRROLATE 0.6MG/3ML
SYRINGE (ML) INTRAVENOUS AS NEEDED
Status: DISCONTINUED | OUTPATIENT
Start: 2025-04-28 | End: 2025-04-28 | Stop reason: SURG

## 2025-04-28 RX ORDER — DEXMEDETOMIDINE HYDROCHLORIDE 4 UG/ML
INJECTION, SOLUTION INTRAVENOUS AS NEEDED
Status: DISCONTINUED | OUTPATIENT
Start: 2025-04-28 | End: 2025-04-28 | Stop reason: SURG

## 2025-04-28 RX ORDER — DEXAMETHASONE SODIUM PHOSPHATE 4 MG/ML
INJECTION, SOLUTION INTRA-ARTICULAR; INTRALESIONAL; INTRAMUSCULAR; INTRAVENOUS; SOFT TISSUE AS NEEDED
Status: DISCONTINUED | OUTPATIENT
Start: 2025-04-28 | End: 2025-04-28 | Stop reason: SURG

## 2025-04-28 RX ORDER — HYDROMORPHONE HYDROCHLORIDE 1 MG/ML
0.5 INJECTION, SOLUTION INTRAMUSCULAR; INTRAVENOUS; SUBCUTANEOUS
Status: DISCONTINUED | OUTPATIENT
Start: 2025-04-28 | End: 2025-04-28 | Stop reason: HOSPADM

## 2025-04-28 RX ORDER — OXYCODONE HYDROCHLORIDE 5 MG/1
5 TABLET ORAL ONCE AS NEEDED
Status: DISCONTINUED | OUTPATIENT
Start: 2025-04-28 | End: 2025-04-28 | Stop reason: HOSPADM

## 2025-04-28 RX ORDER — GABAPENTIN 300 MG/1
300 CAPSULE ORAL DAILY
COMMUNITY

## 2025-04-28 RX ORDER — DEXTROSE 50 % IN WATER (D50W) INTRAVENOUS SYRINGE
25 AS NEEDED
Status: DISCONTINUED | OUTPATIENT
Start: 2025-04-28 | End: 2025-04-28 | Stop reason: HOSPADM

## 2025-04-28 RX ORDER — DEXTROSE 40 %
15-30 GEL (GRAM) ORAL AS NEEDED
Status: DISCONTINUED | OUTPATIENT
Start: 2025-04-28 | End: 2025-04-28 | Stop reason: HOSPADM

## 2025-04-28 RX ADMIN — SODIUM CHLORIDE: 0.9 INJECTION, SOLUTION INTRAVENOUS at 12:59

## 2025-04-28 RX ADMIN — PROPOFOL 150 MG: 10 INJECTION, EMULSION INTRAVENOUS at 13:09

## 2025-04-28 RX ADMIN — MIDAZOLAM HYDROCHLORIDE 2 MG: 1 INJECTION, SOLUTION INTRAMUSCULAR; INTRAVENOUS at 12:59

## 2025-04-28 RX ADMIN — DEXAMETHASONE SODIUM PHOSPHATE 4 MG: 4 INJECTION, SOLUTION INTRA-ARTICULAR; INTRALESIONAL; INTRAMUSCULAR; INTRAVENOUS; SOFT TISSUE at 13:11

## 2025-04-28 RX ADMIN — ONDANSETRON 4 MG: 2 INJECTION INTRAMUSCULAR; INTRAVENOUS at 13:11

## 2025-04-28 RX ADMIN — Medication 5 ML: at 13:09

## 2025-04-28 RX ADMIN — FENTANYL CITRATE 50 MCG: 50 INJECTION INTRAMUSCULAR; INTRAVENOUS at 13:11

## 2025-04-28 RX ADMIN — FENTANYL CITRATE 50 MCG: 50 INJECTION INTRAMUSCULAR; INTRAVENOUS at 13:09

## 2025-04-28 RX ADMIN — DEXMEDETOMIDINE HYDROCHLORIDE 12 MCG: 4 INJECTION, SOLUTION INTRAVENOUS at 13:15

## 2025-04-28 RX ADMIN — GLYCOPYRROLATE 0.2 MG: 0.2 INJECTION, SOLUTION INTRAMUSCULAR; INTRAVENOUS at 12:59

## 2025-04-28 RX ADMIN — PROPOFOL 50 MG: 10 INJECTION, EMULSION INTRAVENOUS at 13:14

## 2025-04-28 ASSESSMENT — COPD QUESTIONNAIRES: COPD: 1

## 2025-04-28 NOTE — ANESTHESIOLOGIST PRE-PROCEDURE ATTESTATION
Pre-Procedure Patient Identification:  I am the Primary Anesthesiologist and have identified the patient on 04/28/25 at 12:08 PM.   I have confirmed the procedure(s) will be performed by the following surgeon/proceduralist Manoj Cameron MD.

## 2025-04-28 NOTE — ANESTHESIA PREPROCEDURE EVALUATION
Relevant Problems   Other   (+) Malignant neoplasm of lower lobe of right lung (CMS/HCC)       Anesthesia ROS/MED HX      Pulmonary    COPD  Cardiovascular   hypertension   ECG reviewed  Endo/Other  History of cancer  Body Habitus: Normal       Past Surgical History   Procedure Laterality Date    Colonoscopy      Ion Navigational Bronchoscopy, EBUS, Robot assisted thoracoscopic right lower lobectomy with en-bloc middle lobe wedge resection, mediastinal lymph node dissection, lysis of adhesions Right 2/15/2023    Performed by Manoj Cameron MD at [facility] [place]    Lung surgery Right 02/2023    RLL lobectomy       Physical Exam    Airway   Mallampati: II   TM distance: >3 FB   Neck ROM: full  Cardiovascular - normal   Rhythm: regular   Rate: normalPulmonary - normal   clear to auscultation  Dental - normal        Anesthesia Plan    Plan: MAC    Technique: MAC     Lines and Monitors: PIV     Airway: natural airway / supplemental oxygen        patient did not smoke on day of surgery   3 ASA  Anesthetic plan and risks discussed with: patient  Induction:    intravenous   Postop Plan:   Patient Disposition: phase II then home   Pain Management: IV analgesics  Comments:    Plan: Risks and benefits of MAC and general anesthesia discussed with patient/guardian. Risks discussed including but not limited to intraoperative awareness, sore throat, damage to lips/teeth, possible aspiration and need for intubation and possible conversion to general anesthesia emergently. All questions answered and patient willing to proceed.     Patient Active Problem List   Diagnosis    Malignant neoplasm of lower lobe of right lung (CMS/HCC)    Right lower lobe pulmonary nodule       No current facility-administered medications for this encounter.       Prior to Admission medications    Medication Sig Start Date End Date Taking? Authorizing Provider   acetaminophen (TYLENOL) 500 mg tablet Take 500 mg by mouth every 6 (six) hours as needed.   Yes  Provider, Georgette Rebollar MD   amLODIPine (NORVASC) 5 mg tablet Take 5 mg by mouth daily.   Yes ProviderGeorgette MD   buPROPion XL (WELLBUTRIN XL) 150 mg 24 hr tablet Take 150 mg by mouth daily. 9/17/24  Yes ProviderGeorgette MD   buPROPion XL (WELLBUTRIN XL) 300 mg 24 hr tablet Take 300 mg by mouth daily.   Yes ProviderGeorgette MD   cetirizine (ZyrTEC) 10 mg tablet Take 10 mg by mouth as needed.   Yes ProviderGeorgette MD   cholecalciferol, vitamin D3, 50 mcg (2,000 unit) capsule Take 2,000 Units by mouth daily.   Yes Georgette Rea MD   clonazePAM (klonoPIN) 1 mg tablet take 1 tablet (1 mg) by mouth in the morning and at bedtime   Yes ProviderGeorgette MD   gabapentin (NEURONTIN) 300 mg capsule Take 300 mg by mouth daily.   Yes ProviderGeorgette MD   sertraline (ZOLOFT) 50 mg tablet Take 50 mg by mouth daily. 3/10/25  Yes ProviderGeorgette MD   TRELERANDA ELLIPTA 100-62.5-25 mcg blister with device powder for inhalation Inhale 1 puff daily.   Yes ProviderGeorgette MD   albuterol HFA 90 mcg/actuation inhaler Inhale 2 puffs every 6 (six) hours as needed for wheezing.    Provider, Georgette Rebollar MD   naproxen (NAPROSYN) 500 mg tablet Take 1 tablet (500 mg total) by mouth 2 (two) times a day with meals for 14 days. For post-op pain  Patient not taking: Reported on 4/28/2025 2/21/23 3/7/23  Porsche Barlow PA C   acetaminophen (TYLENOL) 325 mg tablet Take 3 tablets (975 mg total) by mouth every 8 (eight) hours. 2/21/23 4/28/25  Porsche Barlow PA C   gabapentin (NEURONTIN) 300 mg capsule Take 1 capsule (300 mg total) by mouth 3 (three) times a day for 14 days, THEN 1 capsule (300 mg total) 2 (two) times a day for 7 days, THEN 1 capsule (300 mg total) daily for 7 days. For post-op pain. 2/21/23 4/28/25  Porsche Barlow, ALDO ODONNELL   umeclidinium 62.5 mcg/actuation blister with device Inhale 62.5 mcg daily.  Patient not taking: Reported on  "4/25/2025 4/28/25  ProviderKeturah MD   venlafaxine XR (EFFEXOR-XR) 150 mg 24 hr capsule Take 150 mg by mouth daily.  4/28/25  Keturah Rea MD       CBC Results         04/22/25 02/23/23 02/22/23     1209 0719 0546    WBC 9.44 8.68 7.18    RBC 4.06 3.87 3.82    HGB 12.2 12.1 12.0    HCT 37.9 36.5 35.6    MCV 93.3 94.3 93.2    MCH 30.0 31.3 31.4    MCHC 32.2 33.2 33.7     380 369            BMP Results         04/22/25 02/23/23 02/22/23     1209 0719 0546     141 139    K 4.3 4.7 4.4    Cl 106 104 106    CO2 25 27 26    Glucose 96 107 106    BUN 20 11 9    Creatinine 1.1 0.9 0.9    Calcium 9.7 8.9 9.0    Anion Gap 8 10 7    EGFR 55.5 >60.0 >60.0           Comment for EGFR at 1209 on 04/22/25: Calculation based on the Chronic Kidney Disease Epidemiology Collaboration (CKD-EPI) equation refit without adjustment for race.            No results found for: \"HCGPREGUR\", \"PREGSERUM\", \"HCG\", \"HCGQUANT\"    Results from last 7 days   Lab Units 04/22/25  1209   INR  1.0   PTT sec 28       No orders to display         "

## 2025-04-28 NOTE — ANESTHESIA PROCEDURE NOTES
Airway  Reason: elective    Start Time: 4/28/2025 1:10 PM  Airway not difficult    General Information and Staff   Patient location during procedure: OR  Resident/CRNA: Jesus Roy CRNA  Performed: resident/CRNA   Performed by: Jesus Roy CRNA  Authorized by: Devika Arcos MD        Patient Condition  Indications for airway management: anesthesia  Sedation level: deep     Final Airway Details   Preoxygenated: yes  Final airway type: supraglottic airway  Successful airway: iGel  Size: 4  Number of attempts at approach: 1  Atraumatic airway insertion

## 2025-04-28 NOTE — ANESTHESIA POSTPROCEDURE EVALUATION
Patient: Narcisa Ramirez    Procedure Summary       Date: 04/28/25 Room / Location: [facility] OR  / [facility] OR [facility]    Anesthesia Start: 1259 Anesthesia Stop: 1335    Procedure: FLEXIBLE BRONCHOSCOPY Diagnosis:       Malignant neoplasm of lower lobe of right lung (CMS/HCC)      (C34.31)    Surgeons: Manoj Cameron MD Responsible Provider: Devika Arcos MD    Anesthesia Type: MAC ASA Status: 3            Anesthesia Type: MAC  PACU Vitals  4/28/2025 1331 - 4/28/2025 1431        4/28/2025  1335 4/28/2025  1338 4/28/2025  1345 4/28/2025  1400    BP: 91/52 91/52 100/55 109/52    Temp: 36.2 °C (97.2 °F) -- -- 36.1 °C (96.9 °F)    Pulse: 60 69 69 65    Resp: 16 16 16 18    SpO2: 100 % -- 100 % 95 %                4/28/2025  1410             BP: 101/50       Temp: 36.4 °C (97.6 °F)       Pulse: 66       Resp: 17       SpO2: 94 %                 Anesthesia Post Evaluation    Pain management: adequate  Patient location during evaluation: PACU  Patient participation: complete - patient participated  Level of consciousness: awake and alert  Cardiovascular status: acceptable  Airway Patency: adequate  Respiratory status: acceptable  Hydration status: acceptable  Anesthetic complications: no

## 2025-04-29 LAB
FUNGUS STAIN: NORMAL
RHODAMINE-AURAMINE STN SPEC: NORMAL

## 2025-04-30 LAB — MYCOBACTERIUM SPEC CULT: NORMAL

## 2025-05-01 LAB
MICROORGANISM SPEC CULT: ABNORMAL

## 2025-05-07 ENCOUNTER — RESULTS FOLLOW-UP (OUTPATIENT)
Dept: THORACIC SURGERY | Facility: CLINIC | Age: 65
End: 2025-05-07

## 2025-05-09 DIAGNOSIS — C34.31 MALIGNANT NEOPLASM OF LOWER LOBE OF RIGHT LUNG (CMS/HCC): Primary | ICD-10-CM

## 2025-05-15 LAB
FUNGUS SPEC CULT: ABNORMAL
FUNGUS SPEC CULT: ABNORMAL

## 2025-06-10 LAB — MYCOBACTERIUM SPEC CULT: NORMAL

## (undated) DEVICE — GLOVE SZ 7.5 MICRO PROTEXIS LATEX

## (undated) DEVICE — PACK CARDIOVASCULAR

## (undated) DEVICE — FORCEPS CADIERE ENDOWRIST XI 8MM REPOSABLE

## (undated) DEVICE — SUTURE BIOSYN 4-0 UNDYED 1X30 P-12

## (undated) DEVICE — LOOPS VESSEL MAXI BLUE DISPOSABLE

## (undated) DEVICE — SUTURE TICRON 2 BLUE 1X30 HOS-10

## (undated) DEVICE — KIT CLEANING ENDOSCOPY BEDSIDE

## (undated) DEVICE — DRAPE COLUMN DAVINCI XI

## (undated) DEVICE — SUTURE VICRYL 2-0 J317H SH VIOLET

## (undated) DEVICE — SWIVEL CONNECTOR ION

## (undated) DEVICE — TUBING INSUFFLATION

## (undated) DEVICE — SUTURE MONOCRYL 4-0  Y496G PS-2 I8IN

## (undated) DEVICE — SHEATH ENDOWRIST STAPLER 45 DISP

## (undated) DEVICE — EVACUATOR PLUME-AWAY LAP SMOKE PKG

## (undated) DEVICE — TROCAR 1ST ENTRY 12 X 100MM ADV FIX

## (undated) DEVICE — FORCEPS BIPOLAR MARYLAND XI REPOSABLE

## (undated) DEVICE — SUTURE BIOSYN 4-0 UNDYED 1X18 P-12

## (undated) DEVICE — RELOAD SUREFORM 45 3.5 BLUE 6 ROW

## (undated) DEVICE — CATHETER THORACIC 28FR ANGLED

## (undated) DEVICE — SOLN IRRIG STER WATER 1000ML

## (undated) DEVICE — SHEET DRAPE 3/4 REVERSEFOLD 53X77

## (undated) DEVICE — MARKER SURGICAL SKIN

## (undated) DEVICE — SOLN IRRIG .9%SOD 1000ML

## (undated) DEVICE — CATH THORACIC 32 FR STRT

## (undated) DEVICE — GOWN SURG X-LARGE MICROCOOL

## (undated) DEVICE — TIP BOVIE BLADE COATED 6IN

## (undated) DEVICE — SOLUTION ELECTROLUBE ANTI-STICK

## (undated) DEVICE — DRESSING TELFA 3X4

## (undated) DEVICE — STAPLER SUREFORM 45

## (undated) DEVICE — ***USE 138537*** SUTURE VICRYL 0 J340H CT-1 27IN VIOLET

## (undated) DEVICE — NEEDLE BIOPSY 23G ION

## (undated) DEVICE — ENDOCATCH 15MM

## (undated) DEVICE — SUTURE VICRYL 3-0 J416H SH UNDYED

## (undated) DEVICE — NEEDLE BIOPSY 19G ION

## (undated) DEVICE — ADAPTER SWIVEL FIBEROPTIC

## (undated) DEVICE — GRASPER PLATYPUS FENESTRATED REUSABLE

## (undated) DEVICE — SEAL UNIVERSAL 5MM-8MM XI

## (undated) DEVICE — APPLICATOR DUPLOSPRAY ENDO 40CM

## (undated) DEVICE — TUBE SUCTION 1/4INX20FT STERILE

## (undated) DEVICE — VALVES SUCTION FOR BRONCHO

## (undated) DEVICE — TUBING SMOKE EVAC PENCIL COATED

## (undated) DEVICE — LOOPS VESSEL MINI YELLOW DISPOSABLE

## (undated) DEVICE — DISSECTOR BIPOLAR CRVD XI REPOSABLE

## (undated) DEVICE — APPLICATOR TISSEEL SPRAY TUBING

## (undated) DEVICE — OINTMENT SURGILUBE 4OZ TUBE

## (undated) DEVICE — SOLN IRRIG .9%SOD 500ML

## (undated) DEVICE — COVER MAYO STAND ST 30/CS

## (undated) DEVICE — SYSTEM VISUALIZATION CLEARIFY

## (undated) DEVICE — INSTRUMENTS ROBOT SURGI-SUCTION

## (undated) DEVICE — VISION PROBE AND SUCTION ADAPTER ION

## (undated) DEVICE — DRAPE ARM DAVINCI XI

## (undated) DEVICE — SYRINGE LOR LUER SLIP 10MM

## (undated) DEVICE — TISSEEL PRIMA RTU KIT 10ML

## (undated) DEVICE — MANIFOLD SINGLE PORT NEPTUNE

## (undated) DEVICE — HEMOSTAT SURGICEL SNOW ABSORB 4 X 4

## (undated) DEVICE — DRESSING COVADERM 2IN X 2IN

## (undated) DEVICE — SYRINGE DISP LUER-LOK 30 CC

## (undated) DEVICE — RELOAD WHITE STAPLER 30 XI ENDOWRIST

## (undated) DEVICE — NEEDLE BIOPSY 21G ION

## (undated) DEVICE — SYRINGE 10CC NO NEEDLE ST

## (undated) DEVICE — SEAL 12MM DAVINCI

## (undated) DEVICE — POUCH STERI DRAPE INSTRUMENT LONG

## (undated) DEVICE — SUTURE SOFSILK 0 BLACK 1X30 GS-21

## (undated) DEVICE — DRAPE IOBAN

## (undated) DEVICE — SUTURE POLYSORB 2-0 UNDYED 1X30 GS-22

## (undated) DEVICE — APPLICATOR CHLORAPREP 26ML ORANGE TINT

## (undated) DEVICE — RELOAD SUREFORM 45 4.3 GREEN 6 ROW

## (undated) DEVICE — STERN CHEST TUBE PASSER

## (undated) DEVICE — PACK RFID MAJOR PROCEDURE PACK

## (undated) DEVICE — SYRINGE DISP LUER-LOK 20 CC

## (undated) DEVICE — SYRINGE DISP LUER-LOK 10 CC

## (undated) DEVICE — COVER LIGHT HANDLE

## (undated) DEVICE — TIES CABLE 8IN      1M-BG

## (undated) DEVICE — TOWEL SURGICAL W17XL27IN BLUE COTTON STANDARD PREWASHED DELI

## (undated) DEVICE — ***USE 138572*** SUTURE ETHIBOND 2 X519H OS-4

## (undated) DEVICE — STERISTRIP 1/2INX4IN

## (undated) DEVICE — SHEARS TIP COVER DAVINCI ONE USE

## (undated) DEVICE — SEALER VESSEL DAVINCI XI DISP

## (undated) DEVICE — XI, ENDO WRIST 12-8MM REDUCER

## (undated) DEVICE — NEEDLE DISP HYPO 22GX1-1/2IN

## (undated) DEVICE — DRAIN CHEST PLEUREVAC LATEX FREE

## (undated) DEVICE — ***USE 56957*** SUTURE VICRYL 2-0  J602H

## (undated) DEVICE — STAPLER SKIN

## (undated) DEVICE — DRAPE HALF STERILE

## (undated) DEVICE — ***USE 149534*** FOG-OUT ANTI-FOG MEDC

## (undated) DEVICE — GRASPER FENESTRATED TIP-UP XI REPOSABLE

## (undated) DEVICE — SUTURE POLYSORB 0 VIOLET 1X30 GS-21

## (undated) DEVICE — COVER CAMERA LIGHT HANDLE

## (undated) DEVICE — TAPE MICROPORE 3IN

## (undated) DEVICE — CONNECTOR 3/8X3/8X3/8

## (undated) DEVICE — FORCEP DISP BRONCHOSCOPE 2MM NON-SPIIKED

## (undated) DEVICE — GAUZE ROLL KITTNER 5/PK

## (undated) DEVICE — PAD GROUND ELECTROSURGICAL W/CORD

## (undated) DEVICE — BLADE SCALPEL #15